# Patient Record
Sex: FEMALE | Race: WHITE
[De-identification: names, ages, dates, MRNs, and addresses within clinical notes are randomized per-mention and may not be internally consistent; named-entity substitution may affect disease eponyms.]

---

## 2017-05-25 ENCOUNTER — APPOINTMENT (OUTPATIENT)
Dept: OBGYN | Facility: CLINIC | Age: 77
End: 2017-05-25

## 2017-05-25 ENCOUNTER — RESULT REVIEW (OUTPATIENT)
Age: 77
End: 2017-05-25

## 2017-10-03 ENCOUNTER — APPOINTMENT (OUTPATIENT)
Dept: HEMATOLOGY ONCOLOGY | Facility: CLINIC | Age: 77
End: 2017-10-03

## 2017-10-25 ENCOUNTER — OUTPATIENT (OUTPATIENT)
Dept: OUTPATIENT SERVICES | Facility: HOSPITAL | Age: 77
LOS: 1 days | Discharge: HOME | End: 2017-10-25

## 2017-10-25 DIAGNOSIS — C50.919 MALIGNANT NEOPLASM OF UNSPECIFIED SITE OF UNSPECIFIED FEMALE BREAST: ICD-10-CM

## 2017-10-31 ENCOUNTER — RESULT REVIEW (OUTPATIENT)
Age: 77
End: 2017-10-31

## 2017-10-31 ENCOUNTER — APPOINTMENT (OUTPATIENT)
Dept: HEMATOLOGY ONCOLOGY | Facility: CLINIC | Age: 77
End: 2017-10-31
Payer: MEDICARE

## 2017-10-31 VITALS
RESPIRATION RATE: 16 BRPM | DIASTOLIC BLOOD PRESSURE: 81 MMHG | WEIGHT: 175.05 LBS | HEART RATE: 88 BPM | SYSTOLIC BLOOD PRESSURE: 158 MMHG | TEMPERATURE: 98.7 F | OXYGEN SATURATION: 96 %

## 2017-10-31 DIAGNOSIS — C50.919 MALIGNANT NEOPLASM OF UNSPECIFIED SITE OF UNSPECIFIED FEMALE BREAST: ICD-10-CM

## 2017-10-31 LAB
BASOPHILS # BLD AUTO: 0.1 K/UL — SIGNIFICANT CHANGE UP (ref 0–0.2)
BASOPHILS NFR BLD AUTO: 0.9 % — SIGNIFICANT CHANGE UP (ref 0–2)
EOSINOPHIL # BLD AUTO: 0.2 K/UL — SIGNIFICANT CHANGE UP (ref 0–0.5)
EOSINOPHIL NFR BLD AUTO: 2.9 % — SIGNIFICANT CHANGE UP (ref 0–6)
HCT VFR BLD CALC: 46.9 % — HIGH (ref 34.5–45)
HGB BLD-MCNC: 15.6 G/DL — HIGH (ref 11.5–15.5)
LYMPHOCYTES # BLD AUTO: 1.6 K/UL — SIGNIFICANT CHANGE UP (ref 1–3.3)
LYMPHOCYTES # BLD AUTO: 20 % — SIGNIFICANT CHANGE UP (ref 13–44)
MCHC RBC-ENTMCNC: 31.4 PG — SIGNIFICANT CHANGE UP (ref 27–34)
MCHC RBC-ENTMCNC: 33.2 G/DL — SIGNIFICANT CHANGE UP (ref 32–36)
MCV RBC AUTO: 94.4 FL — SIGNIFICANT CHANGE UP (ref 80–100)
MONOCYTES # BLD AUTO: 0.4 K/UL — SIGNIFICANT CHANGE UP (ref 0–0.9)
MONOCYTES NFR BLD AUTO: 5.4 % — SIGNIFICANT CHANGE UP (ref 2–14)
NEUTROPHILS # BLD AUTO: 5.5 K/UL — SIGNIFICANT CHANGE UP (ref 1.8–7.4)
NEUTROPHILS NFR BLD AUTO: 70.8 % — SIGNIFICANT CHANGE UP (ref 43–77)
PLATELET # BLD AUTO: 235 K/UL — SIGNIFICANT CHANGE UP (ref 150–400)
RBC # BLD: 4.97 M/UL — SIGNIFICANT CHANGE UP (ref 3.8–5.2)
RBC # FLD: 12.6 % — SIGNIFICANT CHANGE UP (ref 10.3–14.5)
WBC # BLD: 7.8 K/UL — SIGNIFICANT CHANGE UP (ref 3.8–10.5)
WBC # FLD AUTO: 7.8 K/UL — SIGNIFICANT CHANGE UP (ref 3.8–10.5)

## 2017-10-31 PROCEDURE — 99214 OFFICE O/P EST MOD 30 MIN: CPT

## 2019-01-03 LAB
ALBUMIN SERPL ELPH-MCNC: 3.9 G/DL
ALP BLD-CCNC: 70 U/L
ALT SERPL-CCNC: 28 U/L
ANION GAP SERPL CALC-SCNC: 15 MMOL/L
AST SERPL-CCNC: 19 U/L
BILIRUB SERPL-MCNC: 0.3 MG/DL
BUN SERPL-MCNC: 14 MG/DL
CALCIUM SERPL-MCNC: 9.5 MG/DL
CHLORIDE SERPL-SCNC: 102 MMOL/L
CO2 SERPL-SCNC: 26 MMOL/L
CREAT SERPL-MCNC: 0.78 MG/DL
GLUCOSE SERPL-MCNC: 145 MG/DL
POTASSIUM SERPL-SCNC: 4.4 MMOL/L
PROT SERPL-MCNC: 6.7 G/DL
SODIUM SERPL-SCNC: 143 MMOL/L

## 2023-03-15 ENCOUNTER — INPATIENT (INPATIENT)
Facility: HOSPITAL | Age: 83
LOS: 2 days | Discharge: HOME | DRG: 605 | End: 2023-03-18
Attending: FAMILY MEDICINE | Admitting: INTERNAL MEDICINE
Payer: MEDICARE

## 2023-03-15 VITALS
RESPIRATION RATE: 20 BRPM | OXYGEN SATURATION: 97 % | TEMPERATURE: 98 F | DIASTOLIC BLOOD PRESSURE: 71 MMHG | HEART RATE: 77 BPM | SYSTOLIC BLOOD PRESSURE: 170 MMHG

## 2023-03-15 DIAGNOSIS — R55 SYNCOPE AND COLLAPSE: ICD-10-CM

## 2023-03-15 LAB
ADD ON TEST-SPECIMEN IN LAB: SIGNIFICANT CHANGE UP
ALBUMIN SERPL ELPH-MCNC: 3.7 G/DL — SIGNIFICANT CHANGE UP (ref 3.3–5)
ALP SERPL-CCNC: 75 U/L — SIGNIFICANT CHANGE UP (ref 40–120)
ALT FLD-CCNC: 26 U/L — SIGNIFICANT CHANGE UP (ref 12–78)
ANION GAP SERPL CALC-SCNC: 3 MMOL/L — LOW (ref 5–17)
APPEARANCE UR: CLEAR — SIGNIFICANT CHANGE UP
AST SERPL-CCNC: 24 U/L — SIGNIFICANT CHANGE UP (ref 15–37)
BASOPHILS # BLD AUTO: 0.09 K/UL — SIGNIFICANT CHANGE UP (ref 0–0.2)
BASOPHILS NFR BLD AUTO: 0.9 % — SIGNIFICANT CHANGE UP (ref 0–2)
BILIRUB SERPL-MCNC: 0.4 MG/DL — SIGNIFICANT CHANGE UP (ref 0.2–1.2)
BILIRUB UR-MCNC: NEGATIVE — SIGNIFICANT CHANGE UP
BUN SERPL-MCNC: 21 MG/DL — SIGNIFICANT CHANGE UP (ref 7–23)
CALCIUM SERPL-MCNC: 9 MG/DL — SIGNIFICANT CHANGE UP (ref 8.5–10.1)
CHLORIDE SERPL-SCNC: 105 MMOL/L — SIGNIFICANT CHANGE UP (ref 96–108)
CO2 SERPL-SCNC: 28 MMOL/L — SIGNIFICANT CHANGE UP (ref 22–31)
COLOR SPEC: YELLOW — SIGNIFICANT CHANGE UP
CREAT SERPL-MCNC: 0.62 MG/DL — SIGNIFICANT CHANGE UP (ref 0.5–1.3)
DIFF PNL FLD: NEGATIVE — SIGNIFICANT CHANGE UP
EGFR: 87 ML/MIN/1.73M2 — SIGNIFICANT CHANGE UP
EOSINOPHIL # BLD AUTO: 0.16 K/UL — SIGNIFICANT CHANGE UP (ref 0–0.5)
EOSINOPHIL NFR BLD AUTO: 1.6 % — SIGNIFICANT CHANGE UP (ref 0–6)
FLUAV AG NPH QL: SIGNIFICANT CHANGE UP
FLUBV AG NPH QL: SIGNIFICANT CHANGE UP
GLUCOSE SERPL-MCNC: 101 MG/DL — HIGH (ref 70–99)
GLUCOSE UR QL: NEGATIVE — SIGNIFICANT CHANGE UP
HCT VFR BLD CALC: 46 % — HIGH (ref 34.5–45)
HGB BLD-MCNC: 15.5 G/DL — SIGNIFICANT CHANGE UP (ref 11.5–15.5)
IMM GRANULOCYTES NFR BLD AUTO: 0.3 % — SIGNIFICANT CHANGE UP (ref 0–0.9)
KETONES UR-MCNC: ABNORMAL
LEUKOCYTE ESTERASE UR-ACNC: NEGATIVE — SIGNIFICANT CHANGE UP
LYMPHOCYTES # BLD AUTO: 1.16 K/UL — SIGNIFICANT CHANGE UP (ref 1–3.3)
LYMPHOCYTES # BLD AUTO: 11.6 % — LOW (ref 13–44)
MCHC RBC-ENTMCNC: 30.4 PG — SIGNIFICANT CHANGE UP (ref 27–34)
MCHC RBC-ENTMCNC: 33.7 GM/DL — SIGNIFICANT CHANGE UP (ref 32–36)
MCV RBC AUTO: 90.2 FL — SIGNIFICANT CHANGE UP (ref 80–100)
MONOCYTES # BLD AUTO: 0.57 K/UL — SIGNIFICANT CHANGE UP (ref 0–0.9)
MONOCYTES NFR BLD AUTO: 5.7 % — SIGNIFICANT CHANGE UP (ref 2–14)
NEUTROPHILS # BLD AUTO: 7.95 K/UL — HIGH (ref 1.8–7.4)
NEUTROPHILS NFR BLD AUTO: 79.9 % — HIGH (ref 43–77)
NITRITE UR-MCNC: NEGATIVE — SIGNIFICANT CHANGE UP
PH UR: 5 — SIGNIFICANT CHANGE UP (ref 5–8)
PLATELET # BLD AUTO: 235 K/UL — SIGNIFICANT CHANGE UP (ref 150–400)
POTASSIUM SERPL-MCNC: 3.9 MMOL/L — SIGNIFICANT CHANGE UP (ref 3.5–5.3)
POTASSIUM SERPL-SCNC: 3.9 MMOL/L — SIGNIFICANT CHANGE UP (ref 3.5–5.3)
PROT SERPL-MCNC: 7.6 GM/DL — SIGNIFICANT CHANGE UP (ref 6–8.3)
PROT UR-MCNC: NEGATIVE — SIGNIFICANT CHANGE UP
RBC # BLD: 5.1 M/UL — SIGNIFICANT CHANGE UP (ref 3.8–5.2)
RBC # FLD: 13 % — SIGNIFICANT CHANGE UP (ref 10.3–14.5)
RSV RNA NPH QL NAA+NON-PROBE: SIGNIFICANT CHANGE UP
SARS-COV-2 RNA SPEC QL NAA+PROBE: SIGNIFICANT CHANGE UP
SODIUM SERPL-SCNC: 136 MMOL/L — SIGNIFICANT CHANGE UP (ref 135–145)
SP GR SPEC: 1.02 — SIGNIFICANT CHANGE UP (ref 1.01–1.02)
TROPONIN I, HIGH SENSITIVITY RESULT: 43 NG/L — SIGNIFICANT CHANGE UP
UROBILINOGEN FLD QL: NEGATIVE — SIGNIFICANT CHANGE UP
WBC # BLD: 9.96 K/UL — SIGNIFICANT CHANGE UP (ref 3.8–10.5)
WBC # FLD AUTO: 9.96 K/UL — SIGNIFICANT CHANGE UP (ref 3.8–10.5)

## 2023-03-15 PROCEDURE — 72125 CT NECK SPINE W/O DYE: CPT | Mod: 26,MA

## 2023-03-15 PROCEDURE — 70450 CT HEAD/BRAIN W/O DYE: CPT | Mod: 26,MA

## 2023-03-15 PROCEDURE — 12001 RPR S/N/AX/GEN/TRNK 2.5CM/<: CPT

## 2023-03-15 PROCEDURE — 99285 EMERGENCY DEPT VISIT HI MDM: CPT | Mod: 25

## 2023-03-15 PROCEDURE — 71045 X-RAY EXAM CHEST 1 VIEW: CPT | Mod: 26

## 2023-03-15 RX ORDER — TETANUS TOXOID, REDUCED DIPHTHERIA TOXOID AND ACELLULAR PERTUSSIS VACCINE, ADSORBED 5; 2.5; 8; 8; 2.5 [IU]/.5ML; [IU]/.5ML; UG/.5ML; UG/.5ML; UG/.5ML
0.5 SUSPENSION INTRAMUSCULAR ONCE
Refills: 0 | Status: COMPLETED | OUTPATIENT
Start: 2023-03-15 | End: 2023-03-15

## 2023-03-15 RX ADMIN — TETANUS TOXOID, REDUCED DIPHTHERIA TOXOID AND ACELLULAR PERTUSSIS VACCINE, ADSORBED 0.5 MILLILITER(S): 5; 2.5; 8; 8; 2.5 SUSPENSION INTRAMUSCULAR at 18:56

## 2023-03-15 NOTE — ED ADULT NURSE NOTE - OBJECTIVE STATEMENT
pt presents to ED from home s/p trip and fall backward down 2 steps. laceration to head. - blood thinners. -LOC. GCS 15. c/o pelvic pain and states she feels like she may have a UTI. NA 1721 MD Lazaro. transported to CT scan. Pt on stretcher in hallway now, son at bedside. Pt is awake and alert answering questions appropriately. Laceration noted to back of head, no active bleed at this time. Son reports that pt has a hx of UTI.

## 2023-03-15 NOTE — ED PROVIDER NOTE - CLINICAL SUMMARY MEDICAL DECISION MAKING FREE TEXT BOX
Patient with unwitnessed fall, ? syncope.  Scalp laceration, repaired in ED.  CT head, C spine, CXR WNL.  Labs WNL.  UA negative.  EKG nonischemic.  Attempted to ambulate patient, states feels more unstable than normal, requiring two person assist.  Plan for admission for syncope obs.  Discussed with Dr. Rosenthal, who would like the patient do not move from ED.

## 2023-03-15 NOTE — PHARMACOTHERAPY INTERVENTION NOTE - COMMENTS
Medication reconciliation completed.  Patient was unable to provide medication information, spoke to son Howard at bedside and they provided current medication list; confirmed with Dr. [facility].  As per Howard pt does not like to take meds with water, prefers to take with juice/applesauce.

## 2023-03-15 NOTE — ED ADULT NURSE NOTE - NSIMPLEMENTINTERV_GEN_ALL_ED
Implemented All Fall with Harm Risk Interventions:  [place] to call system. Call bell, personal items and telephone within reach. Instruct patient to call for assistance. Room bathroom lighting operational. Non-slip footwear when patient is off stretcher. Physically safe environment: no spills, clutter or unnecessary equipment. Stretcher in lowest position, wheels locked, appropriate side rails in place. Provide visual cue, wrist band, yellow gown, etc. Monitor gait and stability. Monitor for mental status changes and reorient to person, place, and time. Review medications for side effects contributing to fall risk. Reinforce activity limits and safety measures with patient and family. Provide visual clues: red socks.

## 2023-03-15 NOTE — ED ADULT TRIAGE NOTE - CHIEF COMPLAINT QUOTE
pt presents to ED from home s/p trip and fall backward down 2 steps. laceration to head. - blood thinners. -LOC. GCS 15. c/o pelvic pain and states she feels like she may have a UTI. NA 1721 MD Lazaro. transported to CT scan.

## 2023-03-15 NOTE — ED PROVIDER NOTE - PHYSICAL EXAMINATION
Physical Exam:  Gen: NAD, non-toxic appearing, able to ambulate without assistance  Head: NCAT  HEENT: EOMI, PEERLA, normal conjunctiva, tongue midline, oral mucosa moist  Lung: CTAB, no respiratory distress, no wheezes/rhonchi/rales B/L, speaking in full sentences  CV: RRR, no murmurs, rubs or gallops, distal pulses 2+ b/l  Abd: soft, nontender, no distention, no guarding, no rigidity, no rebound tenderness  MSK: no visible deformities, ROM normal in UE/LE  Skin: Warm, well perfused, no rash  Psych: normal affect, calm Physical Exam:  Gen: NAD, non-toxic appearing, able to ambulate without assistance  Head: NCAT  HEENT: EOMI, PEERLA, normal conjunctiva, tongue midline, oral mucosa moist  Lung: CTAB, no respiratory distress, no wheezes/rhonchi/rales B/L, speaking in full sentences  CV: RRR, no murmurs, rubs or gallops, distal pulses 2+ b/l  Abd: soft, nontender, no distention, no guarding, no rigidity, no rebound tenderness  MSK: no visible deformities, moving all extremities, ROM normal in UE/LE, no musculoskeletal TTP.  Skin: Warm, well perfused, no rash. + 2.5 cm laceration to occiput. No foreign body. No signs of basilar skull fracture.   Psych: normal affect, calm

## 2023-03-15 NOTE — ED PROVIDER NOTE - OBJECTIVE STATEMENT
82 y/o female presents to ED from home s/p trip and fall backward down 2 steps c/o laceration to head, pelvic pain and states she feels like she may have a UTI. laceration to head. - blood thinners. -LOC. GCS 15. 84 y/o female presents to ED from home s/p trip and fall backward down 2 steps c/o laceration to head, pelvic pain, and states she feels like she may have a UTI. - blood thinners. -LOC. 84 y/o female presents to ED from home s/p unwitnessed trip and fall backward down 2 steps c/o laceration to head, headache, and pelvic pain. Pt states she feels like she may have a UTI. + head strike. - blood thinners. -LOC. Son at bedside reports pt likely hit her head on a door or door hinge at home because it had blood on it. Pt ambulates with a cane at baseline. Pt lives with her other son. 84 y/o female PMHx of UTI presents to ED from home s/p unwitnessed trip and fall backward down 2 steps c/o laceration to head, headache, and pelvic pain. Pt states she feels like she may have a UTI. + head strike. - blood thinners. -LOC. Son at bedside reports pt likely hit her head on a door or door hinge at home because it had blood on it. Pt ambulates with a cane at baseline. Pt lives with her other son.

## 2023-03-16 DIAGNOSIS — R55 SYNCOPE AND COLLAPSE: ICD-10-CM

## 2023-03-16 LAB
CULTURE RESULTS: NO GROWTH — SIGNIFICANT CHANGE UP
SPECIMEN SOURCE: SIGNIFICANT CHANGE UP

## 2023-03-16 PROCEDURE — 80048 BASIC METABOLIC PNL TOTAL CA: CPT

## 2023-03-16 PROCEDURE — 36415 COLL VENOUS BLD VENIPUNCTURE: CPT

## 2023-03-16 PROCEDURE — G0378: CPT

## 2023-03-16 PROCEDURE — 85027 COMPLETE CBC AUTOMATED: CPT

## 2023-03-16 PROCEDURE — 97163 PT EVAL HIGH COMPLEX 45 MIN: CPT | Mod: GP

## 2023-03-16 PROCEDURE — 97530 THERAPEUTIC ACTIVITIES: CPT | Mod: GP

## 2023-03-16 PROCEDURE — 97116 GAIT TRAINING THERAPY: CPT | Mod: GP

## 2023-03-16 RX ORDER — ATORVASTATIN CALCIUM 80 MG/1
20 TABLET, FILM COATED ORAL AT BEDTIME
Refills: 0 | Status: DISCONTINUED | OUTPATIENT
Start: 2023-03-16 | End: 2023-03-18

## 2023-03-16 RX ORDER — AMLODIPINE BESYLATE 2.5 MG/1
10 TABLET ORAL AT BEDTIME
Refills: 0 | Status: DISCONTINUED | OUTPATIENT
Start: 2023-03-16 | End: 2023-03-18

## 2023-03-16 RX ORDER — ONDANSETRON 8 MG/1
4 TABLET, FILM COATED ORAL EVERY 8 HOURS
Refills: 0 | Status: DISCONTINUED | OUTPATIENT
Start: 2023-03-16 | End: 2023-03-18

## 2023-03-16 RX ORDER — ACETAMINOPHEN 500 MG
650 TABLET ORAL EVERY 6 HOURS
Refills: 0 | Status: DISCONTINUED | OUTPATIENT
Start: 2023-03-16 | End: 2023-03-18

## 2023-03-16 RX ORDER — LANOLIN ALCOHOL/MO/W.PET/CERES
3 CREAM (GRAM) TOPICAL AT BEDTIME
Refills: 0 | Status: DISCONTINUED | OUTPATIENT
Start: 2023-03-16 | End: 2023-03-18

## 2023-03-16 RX ORDER — VENLAFAXINE HCL 75 MG
150 CAPSULE, EXT RELEASE 24 HR ORAL DAILY
Refills: 0 | Status: DISCONTINUED | OUTPATIENT
Start: 2023-03-16 | End: 2023-03-18

## 2023-03-16 RX ADMIN — Medication 150 MILLIGRAM(S): at 10:17

## 2023-03-16 RX ADMIN — AMLODIPINE BESYLATE 10 MILLIGRAM(S): 2.5 TABLET ORAL at 21:17

## 2023-03-16 RX ADMIN — ATORVASTATIN CALCIUM 20 MILLIGRAM(S): 80 TABLET, FILM COATED ORAL at 21:17

## 2023-03-16 RX ADMIN — Medication 650 MILLIGRAM(S): at 08:58

## 2023-03-16 NOTE — H&P ADULT - HISTORY OF PRESENT ILLNESS
82 y/o female PMHx of UTI presents to ED from home s/p unwitnessed trip and fall backward down 2 steps c/o laceration to head, headache, and pelvic pain. Pt states she feels like she may have a UTI. + head strike. - blood thinners. -LOC. Son at bedside reports pt likely hit her head on a door or door hinge at home because it had blood on it. Pt ambulates with a cane at baseline. Pt lives with her other son 82 y/o female PMHx of UTI presents to ED from home s/p unwitnessed trip and fall backward down 2 steps c/o laceration to head, headache, and pelvic pain. Pt states she feels like she may have a UTI. + head strike. - blood thinners. -LOC. Son at bedside reports pt likely hit her head on a door or door hinge at home because it had blood on it. Pt ambulates with a cane at baseline. Pt lives with her other son. s/p 7 staples occipital area.

## 2023-03-16 NOTE — H&P ADULT - NSHPPHYSICALEXAM_GEN_ALL_CORE
Vital Signs Last 24 Hrs  T(C): 36.9 (16 Mar 2023 07:18), Max: 37.1 (16 Mar 2023 04:48)  T(F): 98.4 (16 Mar 2023 07:18), Max: 98.7 (16 Mar 2023 04:48)  HR: 73 (16 Mar 2023 07:18) (71 - 77)  BP: 114/64 (16 Mar 2023 07:18) (114/64 - 170/71)  BP(mean): 85 (16 Mar 2023 07:18) (85 - 95)  RR: 19 (16 Mar 2023 07:18) (19 - 20)  SpO2: 96% (16 Mar 2023 07:18) (96% - 97%)    Parameters below as of 16 Mar 2023 07:18  Patient On (Oxygen Delivery Method): room air    PHYSICAL EXAM:      Constitutional: NAD  Eyes: perrl, no conjunctival changes,lac  ENMT: no exudates, moist oral muc, uvula midline  Neck: no JVD, no LAD  Back: no cva tenderness  Respiratory: CTA, no exp wheezes  Cardiovascular: S1S2 reg, no murmur gallop or rub  Gastrointestinal: abd soft, NT/ND + BS  Genitourinary: voiding  Extremities: FROM, no joint effusions, no edema, no clubbing , no cyanosis  Vascular: pedal pulses + bilateral, warm extremities  Neurological: non focal, mot str 5/5/ all extr  Skin: no rashes  Lymph Nodes: no LAD Vital Signs Last 24 Hrs  T(C): 36.9 (16 Mar 2023 07:18), Max: 37.1 (16 Mar 2023 04:48)  T(F): 98.4 (16 Mar 2023 07:18), Max: 98.7 (16 Mar 2023 04:48)  HR: 73 (16 Mar 2023 07:18) (71 - 77)  BP: 114/64 (16 Mar 2023 07:18) (114/64 - 170/71)  BP(mean): 85 (16 Mar 2023 07:18) (85 - 95)  RR: 19 (16 Mar 2023 07:18) (19 - 20)  SpO2: 96% (16 Mar 2023 07:18) (96% - 97%)    Parameters below as of 16 Mar 2023 07:18  Patient On (Oxygen Delivery Method): room air    PHYSICAL EXAM:      Constitutional: NAD  Eyes: perrl, no conjunctival changes,lac  ENMT: no exudates, moist oral muc, uvula midline  Neck: no JVD, no LAD  Back: no cva tenderness  Respiratory: CTA, no exp wheezes  Cardiovascular: S1S2 reg, no murmur gallop or rub  Gastrointestinal: abd soft, NT/ND + BS  Genitourinary: voiding  Extremities: FROM, no joint effusions, no edema, no clubbing , no cyanosis  Vascular: pedal pulses + bilateral, warm extremities  Neurological: non focal, mot str 5/5/ all extr  Skin: occ lac repaired  Lymph Nodes: no LAD

## 2023-03-16 NOTE — PATIENT PROFILE ADULT - FALL HARM RISK - HARM RISK INTERVENTIONS

## 2023-03-16 NOTE — H&P ADULT - NSHPLABSRESULTS_GEN_ALL_CORE
15.5   9.96  )-----------( 235      ( 15 Mar 2023 18:25 )             46.0   03-15    136  |  105  |  21  ----------------------------<  101<H>  3.9   |  28  |  0.62    Ca    9.0      15 Mar 2023 18:25    TPro  7.6  /  Alb  3.7  /  TBili  0.4  /  DBili  x   /  AST  24  /  ALT  26  /  AlkPhos  75  03-15       CT Head No Cont (03.15.23 @ 17:57) >    IMPRESSION:  CT head:  -No acute intracranial findings.  -White matter small vessel disease.  -Left parietal scalp swelling. No fracture identified.    CT cervical spine:  -No acute fracture or dislocation.

## 2023-03-16 NOTE — H&P ADULT - NSHPPOADEEPVENOUSTHROMB_GEN_A_CORE
[address], [address]    Patient call but bad cell phone reception  Tried calling back but got v/m 
no

## 2023-03-16 NOTE — PHYSICAL THERAPY INITIAL EVALUATION ADULT - MODALITIES TREATMENT COMMENTS
pt left seated in chair post Eval; chair alarm donned; HM in place; RN Reny present; callbell in reach; pt instructed not to get up alone; call nursing for assist; ted well; denied pain

## 2023-03-16 NOTE — H&P ADULT - ASSESSMENT
* s/p fall     * s/p fall  s/p 7 staples occ area  ekg- rbb  denies LOC  PT eval    * HTN resume Norvasc

## 2023-03-17 ENCOUNTER — TRANSCRIPTION ENCOUNTER (OUTPATIENT)
Age: 83
End: 2023-03-17

## 2023-03-17 LAB
ANION GAP SERPL CALC-SCNC: 3 MMOL/L — LOW (ref 5–17)
BUN SERPL-MCNC: 15 MG/DL — SIGNIFICANT CHANGE UP (ref 7–23)
CALCIUM SERPL-MCNC: 8.7 MG/DL — SIGNIFICANT CHANGE UP (ref 8.5–10.1)
CHLORIDE SERPL-SCNC: 107 MMOL/L — SIGNIFICANT CHANGE UP (ref 96–108)
CO2 SERPL-SCNC: 28 MMOL/L — SIGNIFICANT CHANGE UP (ref 22–31)
CREAT SERPL-MCNC: 0.52 MG/DL — SIGNIFICANT CHANGE UP (ref 0.5–1.3)
EGFR: 91 ML/MIN/1.73M2 — SIGNIFICANT CHANGE UP
GLUCOSE SERPL-MCNC: 91 MG/DL — SIGNIFICANT CHANGE UP (ref 70–99)
HCT VFR BLD CALC: 41.4 % — SIGNIFICANT CHANGE UP (ref 34.5–45)
HGB BLD-MCNC: 13.7 G/DL — SIGNIFICANT CHANGE UP (ref 11.5–15.5)
MCHC RBC-ENTMCNC: 29.8 PG — SIGNIFICANT CHANGE UP (ref 27–34)
MCHC RBC-ENTMCNC: 33.1 GM/DL — SIGNIFICANT CHANGE UP (ref 32–36)
MCV RBC AUTO: 90.2 FL — SIGNIFICANT CHANGE UP (ref 80–100)
PLATELET # BLD AUTO: 204 K/UL — SIGNIFICANT CHANGE UP (ref 150–400)
POTASSIUM SERPL-MCNC: 3.5 MMOL/L — SIGNIFICANT CHANGE UP (ref 3.5–5.3)
POTASSIUM SERPL-SCNC: 3.5 MMOL/L — SIGNIFICANT CHANGE UP (ref 3.5–5.3)
RBC # BLD: 4.59 M/UL — SIGNIFICANT CHANGE UP (ref 3.8–5.2)
RBC # FLD: 13.2 % — SIGNIFICANT CHANGE UP (ref 10.3–14.5)
SODIUM SERPL-SCNC: 138 MMOL/L — SIGNIFICANT CHANGE UP (ref 135–145)
WBC # BLD: 5.91 K/UL — SIGNIFICANT CHANGE UP (ref 3.8–10.5)
WBC # FLD AUTO: 5.91 K/UL — SIGNIFICANT CHANGE UP (ref 3.8–10.5)

## 2023-03-17 PROCEDURE — 99232 SBSQ HOSP IP/OBS MODERATE 35: CPT

## 2023-03-17 RX ADMIN — AMLODIPINE BESYLATE 10 MILLIGRAM(S): 2.5 TABLET ORAL at 21:10

## 2023-03-17 RX ADMIN — Medication 150 MILLIGRAM(S): at 10:13

## 2023-03-17 RX ADMIN — ATORVASTATIN CALCIUM 20 MILLIGRAM(S): 80 TABLET, FILM COATED ORAL at 21:10

## 2023-03-17 NOTE — DISCHARGE NOTE NURSING/CASE MANAGEMENT/SOCIAL WORK - NSDCVIVACCINE_GEN_ALL_CORE_FT
Tdap; 15-Mar-2023 18:56; Ayana Gonzales (YESICA); [employer]; [id number] (Exp. Date: 08-Dec-2024); IntraMuscular; Deltoid Left.; 0.5 milliLiter(s); VIS (VIS Published: 09-May-2013, VIS Presented: 15-Mar-2023);

## 2023-03-17 NOTE — DISCHARGE NOTE NURSING/CASE MANAGEMENT/SOCIAL WORK - NSSCCONTNUM_GEN_ALL_CORE
Call patient.  Kidney stone pain should be very severe.  Encourage her to drink plenty of water.  Should repeat UA for her blood in her urine in a couple of weeks, and if still present, then refer to urology.   [phone]

## 2023-03-17 NOTE — DISCHARGE NOTE NURSING/CASE MANAGEMENT/SOCIAL WORK - PATIENT PORTAL LINK FT
You can access the FollowMyHealth Patient Portal offered by [facility] by registering at the following website: [email or web address]. By joining [facility]’s FollowMyHealth portal, you will also be able to view your health information using other applications (apps) compatible with our system.

## 2023-03-18 ENCOUNTER — TRANSCRIPTION ENCOUNTER (OUTPATIENT)
Age: 83
End: 2023-03-18

## 2023-03-18 VITALS
TEMPERATURE: 98 F | SYSTOLIC BLOOD PRESSURE: 122 MMHG | HEART RATE: 72 BPM | DIASTOLIC BLOOD PRESSURE: 61 MMHG | OXYGEN SATURATION: 96 % | RESPIRATION RATE: 17 BRPM

## 2023-03-18 PROCEDURE — 99239 HOSP IP/OBS DSCHRG MGMT >30: CPT

## 2023-03-18 RX ADMIN — Medication 150 MILLIGRAM(S): at 11:48

## 2023-03-18 NOTE — DISCHARGE NOTE PROVIDER - HOSPITAL COURSE
HOSPITALIST PROGRESS NOTE:  SUBJECTIVE:  PCP:  Chief Complaint: Patient is a 83y old  Female who presents with a chief complaint of fall (16 Mar 2023 08:26)      HPI:  82 y/o female PMHx of UTI presents to ED from home s/p unwitnessed trip and fall backward down 2 steps c/o laceration to head, headache, and pelvic pain. Pt states she feels like she may have a UTI. + head strike. - blood thinners. -LOC. Son at bedside reports pt likely hit her head on a door or door hinge at home because it had blood on it. Pt ambulates with a cane at baseline. Pt lives with her other son. s/p 7 staples occipital area. (16 Mar 2023 08:26)    3/17:  Above reviewed. Patient denies any headache, dizziness, Dyspnea palpitations Patient is retaining urine.   3/18: Overnight, no urinary retention; patient has no complaints. no worsening headaches or neurological symptoms'      * s/p fall with head trauma  -transfer to med surg  -CT head NAD  -No signs of infection  -No alarms on tele, troponin neg  s/p 7 staples occ area; FU with Primary to remove staples in 10 days   ekg- rbb  denies LOC  PT eval  fall precautions    *Urinary Retention  -continue to bladder scan Q6H  -UA normal  -FU with Urology as outpatient     * HTN/HLD/Depression  -resume home meds    total time 80 minutes

## 2023-03-18 NOTE — DISCHARGE NOTE PROVIDER - NSDCMRMEDTOKEN_GEN_ALL_CORE_FT
amLODIPine 10 mg oral tablet: 1 tab(s) orally once a day  atorvastatin 20 mg oral tablet: 1 tab(s) orally once a day  venlafaxine 150 mg oral tablet, extended release: 1 tab(s) orally once a day

## 2023-03-18 NOTE — DISCHARGE NOTE PROVIDER - NSDCCPCAREPLAN_GEN_ALL_CORE_FT
PRINCIPAL DISCHARGE DIAGNOSIS  Diagnosis: Syncope  Assessment and Plan of Treatment: * s/p fall with head trauma  -CT head NAD  -No signs of infection  -No alarms on tele, troponin neg  s/p 7 staples occ area; FU with Primary to remove staples in 10 days   fall precautions        SECONDARY DISCHARGE DIAGNOSES  Diagnosis: Scalp laceration  Assessment and Plan of Treatment: s/p 7 staples occ area; FU with Primary to remove staples in 10 days    Diagnosis: Urinary retention  Assessment and Plan of Treatment: FU with Urology as outpatient

## 2023-03-18 NOTE — PROGRESS NOTE ADULT - ASSESSMENT
* s/p fall with head trauma  -transfer to med surg  -CT head NAD  -No signs of infection  -No alarms on tele, troponin neg  s/p 7 staples occ area  ekg- rbb  denies LOC  PT eval  fall precautions    *Urinary Retention  -continue to bladder scan Q6H  -UA normal    * HTN/HLD/Depression  -resume home meds    
  * s/p fall with head trauma  -transfer to med surg  -CT head NAD  -No signs of infection  -No alarms on tele, troponin neg  s/p 7 staples occ area; FU with Primary to remove staples in 10 days   ekg- rbb  denies LOC  PT eval  fall precautions    *Urinary Retention  -continue to bladder scan Q6H  -UA normal  -FU with Urology as outpatient     * HTN/HLD/Depression  -resume home meds

## 2023-03-18 NOTE — PROGRESS NOTE ADULT - SUBJECTIVE AND OBJECTIVE BOX
HOSPITALIST PROGRESS NOTE:  SUBJECTIVE:  PCP:  Chief Complaint: Patient is a 83y old  Female who presents with a chief complaint of fall (16 Mar 2023 08:26)      HPI:  82 y/o female PMHx of UTI presents to ED from home s/p unwitnessed trip and fall backward down 2 steps c/o laceration to head, headache, and pelvic pain. Pt states she feels like she may have a UTI. + head strike. - blood thinners. -LOC. Son at bedside reports pt likely hit her head on a door or door hinge at home because it had blood on it. Pt ambulates with a cane at baseline. Pt lives with her other son. s/p 7 staples occipital area. (16 Mar 2023 08:26)    3/17:  Above reviewed. Patietn denies any headache, dizziness, Dysonea, palpiations; Patient is retaining urine.       Allergies:  No Known Allergies    REVIEW OF SYSTEMS:  See HPI. All other review of systems is negative unless indicated above.     OBJECTIVE  Physical Exam:  Vital Signs:    Vital Signs Last 24 Hrs  T(C): 36.9 (17 Mar 2023 14:41), Max: 36.9 (17 Mar 2023 14:41)  T(F): 98.5 (17 Mar 2023 14:41), Max: 98.5 (17 Mar 2023 14:41)  HR: 71 (17 Mar 2023 14:41) (67 - 84)  BP: 110/61 (17 Mar 2023 14:41) (106/57 - 119/62)  BP(mean): --  RR: 18 (17 Mar 2023 14:41) (17 - 18)  SpO2: 99% (17 Mar 2023 14:41) (97% - 100%)    Parameters below as of 17 Mar 2023 14:41  Patient On (Oxygen Delivery Method): room air      I&O's Summary    17 Mar 2023 07:01  -  17 Mar 2023 17:06  --------------------------------------------------------  IN: 0 mL / OUT: 500 mL / NET: -500 mL        Constitutional: NAD, awake and alert  Neurological: no focal deficits  HEENT: PERRLA, EOMI, MMM  Neck: Soft and supple, No LAD, No JVD  Respiratory: Breath sounds are clear bilaterally, No wheezing, rales or rhonchi  Cardiovascular: S1 and S2, regular rate and rhythm; no Murmurs, gallops or rubs  Gastrointestinal: Bowel Sounds present, soft, nontender, nondistended, no guarding, no rebound tenderness  Back: No CVA tenderness   Extremities: No peripheral edema  Vascular: 2+ peripheral pulses  Musculoskeletal: 5/5 strength b/l upper and lower extremities  Skin: No rashes  Breast: Deferred  Rectal: Deferred    MEDICATIONS  (STANDING):  amLODIPine   Tablet 10 milliGRAM(s) Oral at bedtime  atorvastatin 20 milliGRAM(s) Oral at bedtime  venlafaxine XR. 150 milliGRAM(s) Oral daily      LABS: All Labs Reviewed:                        13.7   5.91  )-----------( 204      ( 17 Mar 2023 07:09 )             41.4         138  |  107  |  15  ----------------------------<  91  3.5   |  28  |  0.52    Ca    8.7      17 Mar 2023 07:09    TPro  7.6  /  Alb  3.7  /  TBili  0.4  /  DBili  x   /  AST  24  /  ALT  26  /  AlkPhos  75  03-15      Urinalysis Basic - ( 15 Mar 2023 20:57 )    Color: Yellow / Appearance: Clear / S.020 / pH: x  Gluc: x / Ketone: Small  / Bili: Negative / Urobili: Negative   Blood: x / Protein: Negative / Nitrite: Negative   Leuk Esterase: Negative / RBC: x / WBC x   Sq Epi: x / Non Sq Epi: x / Bacteria: x        Blood Culture:   03-15 @ 20:57  Organism --  Gram Stain Blood -- Gram Stain --  Specimen Source Clean Catch None  Culture-Blood --        RADIOLOGY/EKG:    < from: Xray Chest 1 View- PORTABLE-Urgent (Xray Chest 1 View- PORTABLE-Urgent .) (03.15.23 @ 21:44) >    Impression:    No acute pulmonary disease.      < end of copied text >  < from: CT Cervical Spine No Cont (03.15.23 @ 17:57) >    IMPRESSION:  CT head:  -No acute intracranial findings.  -White matter small vessel disease.  -Left parietal scalp swelling. No fracture identified.    CT cervical spine:  -No acute fracture or dislocation.      < end of copied text >          
HOSPITALIST PROGRESS NOTE:  SUBJECTIVE:  PCP:  Chief Complaint: Patient is a 83y old  Female who presents with a chief complaint of fall (16 Mar 2023 08:26)      HPI:  84 y/o female PMHx of UTI presents to ED from home s/p unwitnessed trip and fall backward down 2 steps c/o laceration to head, headache, and pelvic pain. Pt states she feels like she may have a UTI. + head strike. - blood thinners. -LOC. Son at bedside reports pt likely hit her head on a door or door hinge at home because it had blood on it. Pt ambulates with a cane at baseline. Pt lives with her other son. s/p 7 staples occipital area. (16 Mar 2023 08:26)    3/17:  Above reviewed. Patient denies any headache, dizziness, Dyspnea palpitations Patient is retaining urine.   3/18: Overnight, no urinary retention; patient has no complaints. no worsening headaches or neurological symptoms'      Allergies:  No Known Allergies    REVIEW OF SYSTEMS:  See HPI. All other review of systems is negative unless indicated above.     OBJECTIVE  Physical Exam:  Vital Signs Last 24 Hrs  T(C): 36.8 (18 Mar 2023 07:41), Max: 37.1 (17 Mar 2023 20:40)  T(F): 98.3 (18 Mar 2023 07:41), Max: 98.8 (17 Mar 2023 20:40)  HR: 72 (18 Mar 2023 07:41) (71 - 75)  BP: 122/61 (18 Mar 2023 07:41) (110/61 - 122/61)  BP(mean): --  RR: 17 (18 Mar 2023 07:41) (17 - 18)  SpO2: 96% (18 Mar 2023 07:41) (93% - 99%)    Parameters below as of 18 Mar 2023 07:41  Patient On (Oxygen Delivery Method): room air    Constitutional: NAD, awake and alert  Neurological: no focal deficits  HEENT: PERRLA, EOMI, MMM  Neck: Soft and supple, No LAD, No JVD  Respiratory: Breath sounds are clear bilaterally, No wheezing, rales or rhonchi  Cardiovascular: S1 and S2, regular rate and rhythm; no Murmurs, gallops or rubs  Gastrointestinal: Bowel Sounds present, soft, nontender, nondistended, no guarding, no rebound tenderness  Back: No CVA tenderness   Extremities: No peripheral edema  Vascular: 2+ peripheral pulses  Musculoskeletal: 5/5 strength b/l upper and lower extremities  Skin: No rashes  Breast: Deferred  Rectal: Deferred    MEDICATIONS  (STANDING):  amLODIPine   Tablet 10 milliGRAM(s) Oral at bedtime  atorvastatin 20 milliGRAM(s) Oral at bedtime  venlafaxine XR. 150 milliGRAM(s) Oral daily    Lab Results:  CBC  CBC Full  -  ( 17 Mar 2023 07:09 )  WBC Count : 5.91 K/uL  RBC Count : 4.59 M/uL  Hemoglobin : 13.7 g/dL  Hematocrit : 41.4 %  Platelet Count - Automated : 204 K/uL  Mean Cell Volume : 90.2 fl  Mean Cell Hemoglobin : 29.8 pg  Mean Cell Hemoglobin Concentration : 33.1 gm/dL  Auto Neutrophil # : x  Auto Lymphocyte # : x  Auto Monocyte # : x  Auto Eosinophil # : x  Auto Basophil # : x  Auto Neutrophil % : x  Auto Lymphocyte % : x  Auto Monocyte % : x  Auto Eosinophil % : x  Auto Basophil % : x    .		Differential:	[] Automated		[] Manual  Chemistry                        13.7   5.91  )-----------( 204      ( 17 Mar 2023 07:09 )             41.4     03-17    138  |  107  |  15  ----------------------------<  91  3.5   |  28  |  0.52    Ca    8.7      17 Mar 2023 07:09      MICROBIOLOGY/CULTURES:  Culture Results:   No growth (03-15 @ 20:57)    Urinalysis Basic - ( 15 Mar 2023 20:57 )    Color: Yellow / Appearance: Clear / S.020 / pH: x  Gluc: x / Ketone: Small  / Bili: Negative / Urobili: Negative   Blood: x / Protein: Negative / Nitrite: Negative   Leuk Esterase: Negative / RBC: x / WBC x   Sq Epi: x / Non Sq Epi: x / Bacteria: x      RADIOLOGY/EKG:    < from: Xray Chest 1 View- PORTABLE-Urgent (Xray Chest 1 View- PORTABLE-Urgent .) (03.15.23 @ 21:44) >    Impression:    No acute pulmonary disease.      < end of copied text >  < from: CT Cervical Spine No Cont (03.15.23 @ 17:57) >    IMPRESSION:  CT head:  -No acute intracranial findings.  -White matter small vessel disease.  -Left parietal scalp swelling. No fracture identified.    CT cervical spine:  -No acute fracture or dislocation.      < end of copied text >

## 2023-03-22 DIAGNOSIS — R33.9 RETENTION OF URINE, UNSPECIFIED: ICD-10-CM
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
## 2023-03-22 DIAGNOSIS — F32.A DEPRESSION, UNSPECIFIED: ICD-10-CM
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
## 2023-03-22 DIAGNOSIS — Z20.822 CONTACT WITH AND (SUSPECTED) EXPOSURE TO COVID-19: ICD-10-CM

## 2023-03-22 DIAGNOSIS — I10 ESSENTIAL (PRIMARY) HYPERTENSION: ICD-10-CM

## 2023-03-22 DIAGNOSIS — E78.5 HYPERLIPIDEMIA, UNSPECIFIED: ICD-10-CM

## 2023-03-22 DIAGNOSIS — S01.01XA LACERATION WITHOUT FOREIGN BODY OF SCALP, INITIAL ENCOUNTER: ICD-10-CM

## 2023-05-01 ENCOUNTER — INPATIENT (INPATIENT)
Facility: HOSPITAL | Age: 83
LOS: 2 days | Discharge: HOME | DRG: 312 | End: 2023-05-04
Attending: HOSPITALIST | Admitting: FAMILY MEDICINE
Payer: MEDICARE

## 2023-05-01 VITALS — WEIGHT: 127.21 LBS

## 2023-05-01 DIAGNOSIS — Z98.890 OTHER SPECIFIED POSTPROCEDURAL STATES: Chronic | ICD-10-CM

## 2023-05-01 DIAGNOSIS — Z96.653 PRESENCE OF ARTIFICIAL KNEE JOINT, BILATERAL: Chronic | ICD-10-CM

## 2023-05-01 DIAGNOSIS — R55 SYNCOPE AND COLLAPSE: ICD-10-CM

## 2023-05-01 PROBLEM — N39.0 URINARY TRACT INFECTION, SITE NOT SPECIFIED: Chronic | Status: ACTIVE | Noted: 2023-03-15

## 2023-05-01 LAB
ALBUMIN SERPL ELPH-MCNC: 3.5 G/DL — SIGNIFICANT CHANGE UP (ref 3.3–5)
ALP SERPL-CCNC: 80 U/L — SIGNIFICANT CHANGE UP (ref 40–120)
ALT FLD-CCNC: 30 U/L — SIGNIFICANT CHANGE UP (ref 12–78)
ANION GAP SERPL CALC-SCNC: 5 MMOL/L — SIGNIFICANT CHANGE UP (ref 5–17)
APTT BLD: 26 SEC — LOW (ref 27.5–35.5)
AST SERPL-CCNC: 21 U/L — SIGNIFICANT CHANGE UP (ref 15–37)
BASE EXCESS BLDV CALC-SCNC: 2 MMOL/L — SIGNIFICANT CHANGE UP
BASOPHILS # BLD AUTO: 0.07 K/UL — SIGNIFICANT CHANGE UP (ref 0–0.2)
BASOPHILS NFR BLD AUTO: 1 % — SIGNIFICANT CHANGE UP (ref 0–2)
BILIRUB SERPL-MCNC: 0.3 MG/DL — SIGNIFICANT CHANGE UP (ref 0.2–1.2)
BUN SERPL-MCNC: 23 MG/DL — SIGNIFICANT CHANGE UP (ref 7–23)
CALCIUM SERPL-MCNC: 9.1 MG/DL — SIGNIFICANT CHANGE UP (ref 8.5–10.1)
CHLORIDE SERPL-SCNC: 109 MMOL/L — HIGH (ref 96–108)
CO2 SERPL-SCNC: 26 MMOL/L — SIGNIFICANT CHANGE UP (ref 22–31)
CREAT SERPL-MCNC: 0.63 MG/DL — SIGNIFICANT CHANGE UP (ref 0.5–1.3)
EGFR: 87 ML/MIN/1.73M2 — SIGNIFICANT CHANGE UP
EOSINOPHIL # BLD AUTO: 0.15 K/UL — SIGNIFICANT CHANGE UP (ref 0–0.5)
EOSINOPHIL NFR BLD AUTO: 2.1 % — SIGNIFICANT CHANGE UP (ref 0–6)
GLUCOSE SERPL-MCNC: 105 MG/DL — HIGH (ref 70–99)
HCO3 BLDV-SCNC: 28 MMOL/L — SIGNIFICANT CHANGE UP (ref 22–29)
HCT VFR BLD CALC: 42.6 % — SIGNIFICANT CHANGE UP (ref 34.5–45)
HGB BLD-MCNC: 14.3 G/DL — SIGNIFICANT CHANGE UP (ref 11.5–15.5)
IMM GRANULOCYTES NFR BLD AUTO: 0.1 % — SIGNIFICANT CHANGE UP (ref 0–0.9)
INR BLD: 0.98 RATIO — SIGNIFICANT CHANGE UP (ref 0.88–1.16)
LACTATE SERPL-SCNC: 1.4 MMOL/L — SIGNIFICANT CHANGE UP (ref 0.7–2)
LYMPHOCYTES # BLD AUTO: 1.28 K/UL — SIGNIFICANT CHANGE UP (ref 1–3.3)
LYMPHOCYTES # BLD AUTO: 18 % — SIGNIFICANT CHANGE UP (ref 13–44)
MCHC RBC-ENTMCNC: 30.4 PG — SIGNIFICANT CHANGE UP (ref 27–34)
MCHC RBC-ENTMCNC: 33.6 GM/DL — SIGNIFICANT CHANGE UP (ref 32–36)
MCV RBC AUTO: 90.4 FL — SIGNIFICANT CHANGE UP (ref 80–100)
MONOCYTES # BLD AUTO: 0.43 K/UL — SIGNIFICANT CHANGE UP (ref 0–0.9)
MONOCYTES NFR BLD AUTO: 6.1 % — SIGNIFICANT CHANGE UP (ref 2–14)
NEUTROPHILS # BLD AUTO: 5.16 K/UL — SIGNIFICANT CHANGE UP (ref 1.8–7.4)
NEUTROPHILS NFR BLD AUTO: 72.7 % — SIGNIFICANT CHANGE UP (ref 43–77)
PCO2 BLDV: 47 MMHG — HIGH (ref 39–42)
PH BLDV: 7.38 — SIGNIFICANT CHANGE UP (ref 7.32–7.43)
PLATELET # BLD AUTO: 243 K/UL — SIGNIFICANT CHANGE UP (ref 150–400)
PO2 BLDV: 54 MMHG — SIGNIFICANT CHANGE UP
POTASSIUM SERPL-MCNC: 3.7 MMOL/L — SIGNIFICANT CHANGE UP (ref 3.5–5.3)
POTASSIUM SERPL-SCNC: 3.7 MMOL/L — SIGNIFICANT CHANGE UP (ref 3.5–5.3)
PROT SERPL-MCNC: 7.1 GM/DL — SIGNIFICANT CHANGE UP (ref 6–8.3)
PROTHROM AB SERPL-ACNC: 11.4 SEC — SIGNIFICANT CHANGE UP (ref 10.5–13.4)
RBC # BLD: 4.71 M/UL — SIGNIFICANT CHANGE UP (ref 3.8–5.2)
RBC # FLD: 13.9 % — SIGNIFICANT CHANGE UP (ref 10.3–14.5)
SAO2 % BLDV: 86.9 % — SIGNIFICANT CHANGE UP
SODIUM SERPL-SCNC: 140 MMOL/L — SIGNIFICANT CHANGE UP (ref 135–145)
TROPONIN I, HIGH SENSITIVITY RESULT: 53.56 NG/L — SIGNIFICANT CHANGE UP
WBC # BLD: 7.1 K/UL — SIGNIFICANT CHANGE UP (ref 3.8–10.5)
WBC # FLD AUTO: 7.1 K/UL — SIGNIFICANT CHANGE UP (ref 3.8–10.5)

## 2023-05-01 PROCEDURE — 36415 COLL VENOUS BLD VENIPUNCTURE: CPT

## 2023-05-01 PROCEDURE — 70450 CT HEAD/BRAIN W/O DYE: CPT | Mod: 26,MA

## 2023-05-01 PROCEDURE — 93306 TTE W/DOPPLER COMPLETE: CPT

## 2023-05-01 PROCEDURE — 93010 ELECTROCARDIOGRAM REPORT: CPT

## 2023-05-01 PROCEDURE — 80048 BASIC METABOLIC PNL TOTAL CA: CPT

## 2023-05-01 PROCEDURE — 93880 EXTRACRANIAL BILAT STUDY: CPT | Mod: 26

## 2023-05-01 PROCEDURE — 97530 THERAPEUTIC ACTIVITIES: CPT | Mod: GP

## 2023-05-01 PROCEDURE — 97116 GAIT TRAINING THERAPY: CPT | Mod: GP

## 2023-05-01 PROCEDURE — 99222 1ST HOSP IP/OBS MODERATE 55: CPT

## 2023-05-01 PROCEDURE — 80061 LIPID PANEL: CPT

## 2023-05-01 PROCEDURE — 70551 MRI BRAIN STEM W/O DYE: CPT | Mod: MA

## 2023-05-01 PROCEDURE — 71045 X-RAY EXAM CHEST 1 VIEW: CPT | Mod: 26

## 2023-05-01 PROCEDURE — 70551 MRI BRAIN STEM W/O DYE: CPT | Mod: 26

## 2023-05-01 PROCEDURE — 81001 URINALYSIS AUTO W/SCOPE: CPT

## 2023-05-01 PROCEDURE — 99285 EMERGENCY DEPT VISIT HI MDM: CPT

## 2023-05-01 PROCEDURE — 87086 URINE CULTURE/COLONY COUNT: CPT

## 2023-05-01 PROCEDURE — 97162 PT EVAL MOD COMPLEX 30 MIN: CPT | Mod: GP

## 2023-05-01 PROCEDURE — 95714 VEEG EA 12-26 HR UNMNTR: CPT

## 2023-05-01 PROCEDURE — 83036 HEMOGLOBIN GLYCOSYLATED A1C: CPT

## 2023-05-01 PROCEDURE — 99497 ADVNCD CARE PLAN 30 MIN: CPT | Mod: 25

## 2023-05-01 PROCEDURE — 95700 EEG CONT REC W/VID EEG TECH: CPT

## 2023-05-01 RX ORDER — VENLAFAXINE HCL 75 MG
150 CAPSULE, EXT RELEASE 24 HR ORAL DAILY
Refills: 0 | Status: DISCONTINUED | OUTPATIENT
Start: 2023-05-01 | End: 2023-05-04

## 2023-05-01 RX ORDER — ATORVASTATIN CALCIUM 80 MG/1
20 TABLET, FILM COATED ORAL AT BEDTIME
Refills: 0 | Status: DISCONTINUED | OUTPATIENT
Start: 2023-05-01 | End: 2023-05-04

## 2023-05-01 RX ORDER — ATORVASTATIN CALCIUM 80 MG/1
1 TABLET, FILM COATED ORAL
Qty: 0 | Refills: 0 | DISCHARGE

## 2023-05-01 RX ORDER — SODIUM CHLORIDE 9 MG/ML
1000 INJECTION INTRAMUSCULAR; INTRAVENOUS; SUBCUTANEOUS ONCE
Refills: 0 | Status: COMPLETED | OUTPATIENT
Start: 2023-05-01 | End: 2023-05-01

## 2023-05-01 RX ORDER — AMLODIPINE BESYLATE 2.5 MG/1
10 TABLET ORAL DAILY
Refills: 0 | Status: DISCONTINUED | OUTPATIENT
Start: 2023-05-01 | End: 2023-05-04

## 2023-05-01 RX ORDER — SODIUM CHLORIDE 9 MG/ML
3 INJECTION INTRAMUSCULAR; INTRAVENOUS; SUBCUTANEOUS
Refills: 0 | Status: DISCONTINUED | OUTPATIENT
Start: 2023-05-01 | End: 2023-05-04

## 2023-05-01 RX ADMIN — SODIUM CHLORIDE 3 MILLILITER(S): 9 INJECTION INTRAMUSCULAR; INTRAVENOUS; SUBCUTANEOUS at 23:16

## 2023-05-01 RX ADMIN — SODIUM CHLORIDE 1000 MILLILITER(S): 9 INJECTION INTRAMUSCULAR; INTRAVENOUS; SUBCUTANEOUS at 18:36

## 2023-05-01 RX ADMIN — SODIUM CHLORIDE 3 MILLILITER(S): 9 INJECTION INTRAMUSCULAR; INTRAVENOUS; SUBCUTANEOUS at 18:42

## 2023-05-01 RX ADMIN — ATORVASTATIN CALCIUM 20 MILLIGRAM(S): 80 TABLET, FILM COATED ORAL at 23:22

## 2023-05-01 NOTE — ED PROVIDER NOTE - CLINICAL SUMMARY MEDICAL DECISION MAKING FREE TEXT BOX
84 y/o female BIB daughter with an episode 1 hour PTA of head turning to the right, RUE rigidity, and unresponsiveness with eyes open lasting 1 minute, followed by vomiting and then returned to baseline. Now with generalized weakness, no incontinence or oral bleeding. Head injury 1 month ago. On arrival, neurologically intact NIHSS of 0, symptoms not suggestive of CVA. No indications for code stroke. Will work-up for syncope, seizure, ICH, reassess. 84 y/o female BIB daughter with an episode 1 hour PTA of head turning to the right, RUE rigidity, and unresponsiveness with eyes open lasting 1 minute, followed by vomiting and then returned to baseline. Now with generalized weakness, no incontinence or oral bleeding. Head injury 1 month ago. On arrival, neurologically intact NIHSS of 0, symptoms not suggestive of CVA. No indications for code stroke. Will work-up for syncope, seizure, ICH, reassess.  -CTH, labs unremarkable. initiated Neuro cs in ED, see note for details, appreciate recs. Will admit for further workup of syncope vs seizure

## 2023-05-01 NOTE — ED ADULT NURSE NOTE - CHIEF COMPLAINT QUOTE
SIB  MD AZEVEDO FOR SUDDEN ONSET OF AMSX1 HOUR AGO, RIGID POSTURE, WEAKNESS ON LEFT SIDE. PT IS IN TRIAGE AT THIS MOMENT SHE IS BACK TO BASELINE ABLE TO ANSWER ALL QUESTIONS., ABLE TO STAND AND RESPOND TO QUESTIONS. MD NEWELL AT BEDSIDE. nEURO ALERT CALLED.@[phone]

## 2023-05-01 NOTE — ED ADULT NURSE REASSESSMENT NOTE - NS ED NURSE REASSESS COMMENT FT1
patient resting in bed comfortably on monitor. alert and oriented x 4, perrl, respirations even and unlabored. ate dinner without any issues. mri resulted. patient admitted, awaiting bed assignment. family at bedside. no complaints at this time. will continue to monitor.

## 2023-05-01 NOTE — H&P ADULT - NSHPPHYSICALEXAM_GEN_ALL_CORE
ICU Vital Signs Last 24 Hrs  T(C): 36.9 (01 May 2023 23:33), Max: 36.9 (01 May 2023 23:33)  T(F): 98.4 (01 May 2023 23:33), Max: 98.4 (01 May 2023 23:33)  HR: 71 (01 May 2023 23:33) (67 - 71)  BP: 125/65 (01 May 2023 23:33) (125/65 - 133/70)  BP(mean): 80 (01 May 2023 23:33) (80 - 89)    RR: 17 (01 May 2023 23:33) (13 - 20)  SpO2: 98% (01 May 2023 23:33) (98% - 100%)    O2 Parameters below as of 01 May 2023 23:33  Patient On (Oxygen Delivery Method): room air

## 2023-05-01 NOTE — ED PROVIDER NOTE - PHYSICAL EXAMINATION
General: AAOx3, NAD  HEENT: NCAT  Cardiac: Normal rate and rhythm, soft systolic murmur, normal peripheral perfusion  Respiratory: Normal rate and effort. CTAB  GI: Soft, nondistended, nontender  Neuro: No focal deficits. BROOKE equally x4, sensation to light touch intact throughout. NIHSS = 0.   MSK: FROMx4, no focal bony tenderness, no peripheral edema  Skin: No rash

## 2023-05-01 NOTE — ED ADULT NURSE REASSESSMENT NOTE - NS ED NURSE REASSESS COMMENT FT1
patient resting in bed comfortably. alert and oriented x 4, perrl, respirations even and unlabored, ms strength 5/5 upper and lower ext bilaterally. ambulatory with assistance to bathroom, voided small amount and missed urine collection container. will reattempt urine collection during next void. no complaints at this time. admitted, awaiting bed placement. will continue to monitor.

## 2023-05-01 NOTE — ED PROVIDER NOTE - OBJECTIVE STATEMENT
82 y/o female with PMHx of HLD and HTN presents to the ED with daughter. Daughter reports 1 hour ago pt was sitting on the couch when she made a "weird" sound and turned to her side. Her RUE suddenly became rigid and pt was unresponsive. After pt came to, she vomited once and c/o feeling fatigued and weak, was speaking normally. Pt does not recall episode. Was sent by PCP to ED to r/o stroke. Daughter denies urinary/bowel incontinence. Reports pt fell about 1 month ago. BFAST +. 82 y/o female with PMHx of HLD and HTN presents to the ED with daughter. Daughter reports 1 hour ago pt was sitting on the couch when she made a "weird" sound and turned to her side. Her RUE suddenly became rigid and pt was unresponsive. Episode lasted about 1 minute. After pt came to, she vomited once and c/o feeling fatigued and weak, was speaking normally. Pt does not recall episode. Was sent by PCP to ED to r/o stroke. Daughter denies urinary/bowel incontinence. Reports pt fell about 1 month ago, had head injury then. BFAST +. Neuro alert activated at triage.

## 2023-05-01 NOTE — ED ADULT TRIAGE NOTE - CHIEF COMPLAINT QUOTE
SIB  MD AZEVEDO FOR SUDDEN ONSET OF AMSX1 HOUR AGO, RIGID POSTURE, WEAKNESS ON LEFT SIDE. PT IS IN TRIAGE AT THIS MOMENT SHE IS BACK TO BASELINE ABLE TO ANSWER ALL QUESTIONS., ABLE TO STAND AND RESPOND TO QUESTIONS. MD NEWELL AT BEDSIDE. nEURO ALERT CALLED.@[phone]

## 2023-05-01 NOTE — ED PROVIDER NOTE - NS ED ROS FT
Constitutional: + Fatigue, no fevers, chills, or sweats.  Cardiac: No chest pain, exertional dyspnea, orthopnea  Respiratory: No shortness of breath, no cough  GI: + Vomiting  Neuro: No headaches, no neck pain/stiffness, no numbness  All other systems reviewed and are negative unless otherwise stated in the HPI.

## 2023-05-01 NOTE — PHARMACOTHERAPY INTERVENTION NOTE - COMMENTS
Medication reconciliation completed.  Reviewed Medication list and confirmed med allergies with patient; confirmed with Dr. First Mededmund.

## 2023-05-01 NOTE — CONSULT NOTE ADULT - SUBJECTIVE AND OBJECTIVE BOX
Neurology Consult requested by:   Patient is a 83y old  Female who presents with a chief complaint of    HPI:      PAST MEDICAL & SURGICAL HISTORY:  UTI (urinary tract infection)        FAMILY HISTORY:    Social Hx:  Nonsmoker, no drug or alcohol use  Medications and Allergies ReviewedMEDICATIONS  (STANDING):     ROS: Pertinent positives in HPI, all other ROS were reviewed and are negative.      Examination:   Vital Signs Last 24 Hrs  T(C): 36.5 (01 May 2023 15:40), Max: 36.5 (01 May 2023 15:40)  T(F): 97.7 (01 May 2023 15:40), Max: 97.7 (01 May 2023 15:40)  HR: 67 (01 May 2023 15:40) (67 - 67)  BP: 133/70 (01 May 2023 15:40) (133/70 - 133/70)  BP(mean): 89 (01 May 2023 15:40) (89 - 89)  RR: 13 (01 May 2023 15:40) (13 - 13)  SpO2: 100% (01 May 2023 15:40) (100% - 100%)    Parameters below as of 01 May 2023 15:40  Patient On (Oxygen Delivery Method): room air      General: Cooperative, NAD   NECK: supple, no masses  ENT: Normal hearing   Vascular : no carotid bruits,   Lungs: CTAB  Chest: RRR, no murmurs  Extremities: nontender, no edema  Musculoskeletal: no adventitious movements, no joint stiffness  Skin: no rash    Neurological Examination:  NIHSS:  MS: AOx3. Appropriately interactive, normal affect. Speech fluent w/o paraphasic error, repetition, naming, reading is intact   CN: No Papilledema, PERLL, EOMI, V1-3 sensation intact, face symmetric, hearing intact, palate elevates symmetrically, tongue midline, SCM equal bilaterally  Motor: normal bulk and tone, no tremor, rigidity or bradykinesia.  5/5 all over   Sens: Intact to light touch.    Reflexes: 2/4 all over, downgoing toes b/l  Coord:  No dysmetria, KARO intact   Gait: Cannot test    Labs: Reviewed  Complete Blood Count + Automated Diff (05.01.23 @ 16:00)   WBC Count: 7.10 K/uL  RBC Count: 4.71 M/uL  Hemoglobin: 14.3 g/dL  Hematocrit: 42.6 %  Mean Cell Volume: 90.4 fl  Mean Cell Hemoglobin: 30.4 pg  Mean Cell Hemoglobin Conc: 33.6 gm/dL  Red Cell Distrib Width: 13.9 %  Platelet Count - Automated: 243 K/uL      < from: CT Head No Cont (05.01.23 @ 15:42) >  FINDINGS:  The ventricles and sulci are prominent, compatible with age-related   generalized cerebral volume loss.   There is no CT evidence for acute   cerebral cortical infarct. There is no evidence of hemorrhage. There is   periventricular and subcortical white matter hypoattenuation,  most   compatible with chronic microvascular ischemic changes.   No mass effect   is found in the brain. There is no midline shift or herniation pattern.      The visualized portions of the paranasal sinuses and mastoid air cells   are clear.    IMPRESSION:    No evidence of acute intracranial abnormality.  No evidence of hemorrhage.    < end of copied text >            Imaging: Reviewed    A/P:    No IV tpa given because…  Total Critical Care Time spent:   Neurology Consult requested by:   Patient is a 83y old  Female who presents with a chief complaint of    HPI:  82 y/o woman with PMHx of HLD, HTN, depression presents to the ED with daughter, who reports earlier today, pt was sitting on the couch when she made a "weird" sound and turned to her side. Her RUE suddenly became rigid and pt was unresponsive. Episode lasted about 1 minute. After pt came to, she vomited once, felt cold, clammy, and fatigued,. Daughter reports was moving all extremities, seemed back to herself. Pt does not recall episode, no preceeding sxs, was at  3/2023 after fall, also then doesn't recall why she felled.     PAST MEDICAL & SURGICAL HISTORY:  UTI (urinary tract infection)        FAMILY HISTORY:    Social Hx:  Nonsmoker, no drug or alcohol use  Medications and Allergies ReviewedMEDICATIONS  (STANDING):     ROS: Pertinent positives in HPI, all other ROS were reviewed and are negative.      Examination:   Vital Signs Last 24 Hrs  T(C): 36.5 (01 May 2023 15:40), Max: 36.5 (01 May 2023 15:40)  T(F): 97.7 (01 May 2023 15:40), Max: 97.7 (01 May 2023 15:40)  HR: 67 (01 May 2023 15:40) (67 - 67)  BP: 133/70 (01 May 2023 15:40) (133/70 - 133/70)  BP(mean): 89 (01 May 2023 15:40) (89 - 89)  RR: 13 (01 May 2023 15:40) (13 - 13)  SpO2: 100% (01 May 2023 15:40) (100% - 100%)    Parameters below as of 01 May 2023 15:40  Patient On (Oxygen Delivery Method): room air      General: Cooperative, NAD   NECK: supple, no masses  ENT: Normal hearing   Vascular : no carotid bruits,   Lungs: CTAB  Chest: RRR, no murmurs  Extremities: nontender, no edema  Musculoskeletal: no adventitious movements, no joint stiffness  Skin: no rash    Neurological Examination:  NIHSS:0  MS: AOx3. Appropriately interactive, normal affect. Speech fluent w/o paraphasic error, repetition, naming is intact   CN: VFFTC, PERLL, EOMI, V1-3 sensation intact, face symmetric, hearing intact, palate elevates symmetrically, tongue midline, SCM equal bilaterally  Motor: normal bulk and tone, no tremor, rigidity or bradykinesia. No drift of the extremities, strength 4/5 all over   Sens: Intact to light touch.    Reflexes: 0-1/4 all over, downgoing toes b/l  Coord:  No dysmetria, KARO intact   Gait: Cannot test    Labs: Reviewed  Complete Blood Count + Automated Diff (05.01.23 @ 16:00)   WBC Count: 7.10 K/uL  RBC Count: 4.71 M/uL  Hemoglobin: 14.3 g/dL  Hematocrit: 42.6 %  Mean Cell Volume: 90.4 fl  Mean Cell Hemoglobin: 30.4 pg  Mean Cell Hemoglobin Conc: 33.6 gm/dL  Red Cell Distrib Width: 13.9 %  Platelet Count - Automated: 243 K/uL      < from: CT Head No Cont (05.01.23 @ 15:42) >  FINDINGS:  The ventricles and sulci are prominent, compatible with age-related   generalized cerebral volume loss.   There is no CT evidence for acute   cerebral cortical infarct. There is no evidence of hemorrhage. There is   periventricular and subcortical white matter hypoattenuation,  most   compatible with chronic microvascular ischemic changes.   No mass effect   is found in the brain. There is no midline shift or herniation pattern.      The visualized portions of the paranasal sinuses and mastoid air cells   are clear.    IMPRESSION:    No evidence of acute intracranial abnormality.  No evidence of hemorrhage.    < end of copied text >            Imaging: Reviewed    A/P:    No IV tpa given because…  Total Critical Care Time spent:

## 2023-05-01 NOTE — ED PROVIDER NOTE - NSCAREINITIATED _GEN_ER
Annette Mckeon is a 23 year old male.  Chief Complaint   Patient presents with   • Refill Request     22 y/o male presents to clinic for medication refill. Patient has h/o asthma and ran out of inhaler. Denies SOB, Wheezing, or chest pain.     Asthma  There is no chest tightness, cough, difficulty breathing, hoarse voice or shortness of breath. His past medical history is significant for asthma.     Patient's medications, allergies, past medical, surgical, and social history  were reviewed and updated as appropriate.  Current Outpatient Medications   Medication Sig Dispense Refill   • albuterol 108 (90 Base) MCG/ACT inhaler Inhale 2 puffs into the lungs every 4 hours as needed for Shortness of Breath or Wheezing. 1 each 0   • predniSONE (DELTASONE) 20 MG tablet Take 2 tablets by mouth daily. 10 tablet 0     No current facility-administered medications for this visit.     ALLERGIES:   Allergen Reactions   • Shellfish Allergy   (Food Or Med) THROAT SWELLING   • Shellfish-Derived Products   (Food Or Med) ANAPHYLAXIS   • Eggs Or Egg-Derived Products   (Food Or Med) Other (See Comments)     food allergy -eggs     Past Medical History:   Diagnosis Date   • RAD (reactive airway disease)      History reviewed. No pertinent surgical history.  Social History     Tobacco Use   Smoking Status Not on file   Smokeless Tobacco Not on file     Social History     Substance and Sexual Activity   Alcohol Use None     [unfilled]  Review of Systems   Constitutional: Negative.    HENT: Negative.  Negative for hoarse voice.    Respiratory: Negative.  Negative for cough and shortness of breath.    Cardiovascular: Negative.    Neurological: Negative.      Objective   Visit Vitals  /80 (BP Location: LUE - Left upper extremity, Patient Position: Sitting, Cuff Size: Regular)   Pulse 66   Temp 98.1 °F (36.7 °C) (Temporal)   Resp 19   Ht 5' 10.47\" (1.79 m)   Wt 81 kg (178 lb 9.2 oz)   SpO2 99%   BMI 25.28 kg/m²     Physical  Exam  Vitals reviewed.   Constitutional:       Appearance: Normal appearance.   HENT:      Head: Normocephalic.   Cardiovascular:      Rate and Rhythm: Normal rate.   Neurological:      General: No focal deficit present.      Mental Status: He is alert.       Assessment   Mild Intermittent Asthma without complications  Medication Refill  Return if symptoms worsen or fail to improve.    Verbal and written patient education provided related to diagnosis.  See attached patient handouts.  All questions answered to patient's satisfaction.    Follow up with your Primary Care Provider or this clinic if symptoms persist or worsen.  Thank you for visiting [facility].     Ezra Hunt(Attending)

## 2023-05-01 NOTE — H&P ADULT - ASSESSMENT
Pt is admitted w/    Syncope  Possible seizure       DVT : lovenox  Full Code,  MOLST completed with pt Pt is admitted w/    Syncope  Possible seizure, Convulsion?    CVA less likely  Fall in March 2023  HTN Hx  Anxiety Depression on Effexor  UTI Hx    - CTs , MRI reviewed  - s/p 1 L NS in the ED   - Admit to telemetry, eval for arrythmia  - Seizure precautions  - obtain ua , u cx  - Neurochecks Q 4 hours  - lipid panel, HgA1C, fasting BMP in AM  - video EEG 24 hour  - apprec Neurology consult  - cardiology consult  - DVT proph : lovenox  -  Full Code,  MOLST completed with pt

## 2023-05-01 NOTE — H&P ADULT - CONVERSATION DETAILS
Pt states she is Full Code and would want a trial CPR,  trial of intubation, IVF, antibiotics.  She states her children would help with decision making as HCP,  #1. Nyasia (daughter) , #2. Howard (son),  #3. Ash (son). Pt states she is Full Code and would want a trial CPR,  trial of intubation, IVF, antibiotics.  She states her children would help with decision making as her HCP.  In the ED pt stated  #1. Nyasia (daughter) , #2. Howard (son),  #3. Ash (son).         I contacted pt's daughter Nyasia,  via phone [phone].  Nyasia states they have legal paperwork at home and she believes,  her brother Howard is the 1st HC Proxy,  followed by Nyasia and Ash.

## 2023-05-01 NOTE — ED ADULT NURSE NOTE - OBJECTIVE STATEMENT
83y female presents to the ED A/Ox4, brought by daughter reporting that pt made a weird sound and became rigid, rolled her eyes and was unresponsive. Pt threw up after and was confused. Pt is not acting within baseline c/o of headache.

## 2023-05-01 NOTE — H&P ADULT - NSHPSOCIALHISTORY_GEN_ALL_CORE
No tob/ETOH/drug use.  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. Pt lives with her  and her son Ash.  No tob/ETOH/drug use.  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

## 2023-05-01 NOTE — H&P ADULT - HISTORY OF PRESENT ILLNESS
: 82 y/o female with PMHx of HLD and HTN presents to the ED with daughter. Daughter reports 1 hour ago pt was sitting on the couch when she made a "weird" sound and turned to her side. Her RUE suddenly became rigid and pt was unresponsive. Episode lasted about 1 minute. After pt came to, she vomited once and c/o feeling fatigued and weak, was speaking normally. Pt does not recall episode. Was sent by PCP to ED to r/o stroke. Daughter denies urinary/bowel incontinence. Reports pt fell about 1 month ago, had head injury then. BFAST +. Neuro alert activated at triage.   Pt is a pleasant  82 y/o female with PMHx of HLD,  HTN, UTI, Anxiety/Depression on Effexor ,  recent admission for witnessed fall and head injury last month who  presented  to [place]  ED with her daughter, Nyasia.   Pt's  daughter reported that  around 1:30 pm in the afternoon,  pt was sitting on the couch when she made a "weird" sound and turned to her side. Her right arm became rigid and pt was unresponsive for about one minute.    Once pt  became alert , she vomited once and c/o feeling fatigued and weak.  Pt's  speech  was normal,  and she did not recall the episode.   No tongue biting or incontinence.   Pt did have breakfast and has been in her usual health.     PCP  advised pt to go to the ED to evaluate for possible stroke.  In the ED , pt was BFAST positive and had Neuro alert activated at triage.  Pt denies chest pain/palpitations, no SOB/abd pain,  no n/v/d,  no  URI or UTI symptoms,  no fever/chills.  Pt reports an occas dry cough and generalized arthritis.

## 2023-05-01 NOTE — ED PROVIDER NOTE - NSICDXPASTMEDICALHX_GEN_ALL_CORE_FT
PAST MEDICAL HISTORY:  UTI (urinary tract infection)       HLD (hyperlipidemia)     HTN (hypertension)

## 2023-05-01 NOTE — CONSULT NOTE ADULT - ASSESSMENT
83 year old woman s/p LOC, non focal exam. CT head shows no acute changes. ? seizure, ? convulsive syncope. Less likely CVA.  Suggest:  admit to monitored bed  neuro checks q 2 hours  seizure precautions  f/u labs, lipids, HGA1C  f/u B/p  MR head wo  carotid dopplers  video EEG for 24 hurs

## 2023-05-02 ENCOUNTER — TRANSCRIPTION ENCOUNTER (OUTPATIENT)
Age: 83
End: 2023-05-02

## 2023-05-02 LAB
A1C WITH ESTIMATED AVERAGE GLUCOSE RESULT: 5.1 % — SIGNIFICANT CHANGE UP (ref 4–5.6)
APPEARANCE UR: CLEAR — SIGNIFICANT CHANGE UP
BACTERIA # UR AUTO: ABNORMAL
BILIRUB UR-MCNC: NEGATIVE — SIGNIFICANT CHANGE UP
BUN SERPL-MCNC: 18 MG/DL — SIGNIFICANT CHANGE UP (ref 7–23)
CALCIUM SERPL-MCNC: 8.6 MG/DL — SIGNIFICANT CHANGE UP (ref 8.5–10.1)
CHLORIDE SERPL-SCNC: 109 MMOL/L — HIGH (ref 96–108)
CHOLEST SERPL-MCNC: 196 MG/DL — SIGNIFICANT CHANGE UP
CO2 SERPL-SCNC: 30 MMOL/L — SIGNIFICANT CHANGE UP (ref 22–31)
COLOR SPEC: YELLOW — SIGNIFICANT CHANGE UP
CREAT SERPL-MCNC: 0.55 MG/DL — SIGNIFICANT CHANGE UP (ref 0.5–1.3)
DIFF PNL FLD: ABNORMAL
EGFR: 90 ML/MIN/1.73M2 — SIGNIFICANT CHANGE UP
EPI CELLS # UR: SIGNIFICANT CHANGE UP
ESTIMATED AVERAGE GLUCOSE: 100 MG/DL — SIGNIFICANT CHANGE UP (ref 68–114)
GLUCOSE SERPL-MCNC: 93 MG/DL — SIGNIFICANT CHANGE UP (ref 70–99)
GLUCOSE UR QL: NEGATIVE — SIGNIFICANT CHANGE UP
HDLC SERPL-MCNC: 109 MG/DL — SIGNIFICANT CHANGE UP
KETONES UR-MCNC: NEGATIVE — SIGNIFICANT CHANGE UP
LEUKOCYTE ESTERASE UR-ACNC: ABNORMAL
LIPID PNL WITH DIRECT LDL SERPL: 78 MG/DL — SIGNIFICANT CHANGE UP
NITRITE UR-MCNC: NEGATIVE — SIGNIFICANT CHANGE UP
NON HDL CHOLESTEROL: 88 MG/DL — SIGNIFICANT CHANGE UP
PH UR: 5 — SIGNIFICANT CHANGE UP (ref 5–8)
POTASSIUM SERPL-MCNC: 3.5 MMOL/L — SIGNIFICANT CHANGE UP (ref 3.5–5.3)
POTASSIUM SERPL-SCNC: 3.5 MMOL/L — SIGNIFICANT CHANGE UP (ref 3.5–5.3)
PROT UR-MCNC: NEGATIVE — SIGNIFICANT CHANGE UP
RBC CASTS # UR COMP ASSIST: SIGNIFICANT CHANGE UP /HPF (ref 0–4)
SODIUM SERPL-SCNC: 139 MMOL/L — SIGNIFICANT CHANGE UP (ref 135–145)
SP GR SPEC: 1.02 — SIGNIFICANT CHANGE UP (ref 1.01–1.02)
TRIGL SERPL-MCNC: 46 MG/DL — SIGNIFICANT CHANGE UP
UROBILINOGEN FLD QL: NEGATIVE — SIGNIFICANT CHANGE UP
WBC UR QL: ABNORMAL /HPF (ref 0–5)

## 2023-05-02 PROCEDURE — 99232 SBSQ HOSP IP/OBS MODERATE 35: CPT

## 2023-05-02 RX ORDER — MIDAZOLAM HYDROCHLORIDE 1 MG/ML
10 INJECTION, SOLUTION INTRAMUSCULAR; INTRAVENOUS ONCE
Refills: 0 | Status: DISCONTINUED | OUTPATIENT
Start: 2023-05-02 | End: 2023-05-02

## 2023-05-02 RX ORDER — ENOXAPARIN SODIUM 100 MG/ML
30 INJECTION SUBCUTANEOUS EVERY 24 HOURS
Refills: 0 | Status: DISCONTINUED | OUTPATIENT
Start: 2023-05-02 | End: 2023-05-04

## 2023-05-02 RX ADMIN — SODIUM CHLORIDE 3 MILLILITER(S): 9 INJECTION INTRAMUSCULAR; INTRAVENOUS; SUBCUTANEOUS at 06:46

## 2023-05-02 RX ADMIN — Medication 150 MILLIGRAM(S): at 09:28

## 2023-05-02 RX ADMIN — ENOXAPARIN SODIUM 30 MILLIGRAM(S): 100 INJECTION SUBCUTANEOUS at 09:27

## 2023-05-02 RX ADMIN — AMLODIPINE BESYLATE 10 MILLIGRAM(S): 2.5 TABLET ORAL at 09:28

## 2023-05-02 RX ADMIN — ATORVASTATIN CALCIUM 20 MILLIGRAM(S): 80 TABLET, FILM COATED ORAL at 22:08

## 2023-05-02 RX ADMIN — SODIUM CHLORIDE 3 MILLILITER(S): 9 INJECTION INTRAMUSCULAR; INTRAVENOUS; SUBCUTANEOUS at 17:58

## 2023-05-02 RX ADMIN — SODIUM CHLORIDE 3 MILLILITER(S): 9 INJECTION INTRAMUSCULAR; INTRAVENOUS; SUBCUTANEOUS at 11:35

## 2023-05-02 NOTE — PHYSICAL THERAPY INITIAL EVALUATION ADULT - PERTINENT HX OF CURRENT PROBLEM, REHAB EVAL
Pt is an 83 year old female presenting after episode of unresponsiveness. CT head/MR  showed no acute changes. Undergoing EEG. PMH listed below.

## 2023-05-02 NOTE — PATIENT PROFILE ADULT - HEALTH LITERACY
[FreeTextEntry1] : Jocleynn will follow me in January 2021 upon completion of surveillance CT scan. no

## 2023-05-02 NOTE — DIETITIAN INITIAL EVALUATION ADULT - OTHER INFO
Pt is a pleasant  82 y/o female with PMHx of HLD,  HTN, UTI, Anxiety/Depression on Effexor, recent admission for witnessed fall and head injury last month who  presented to [place]  ED with her daughter, Nyasia. Pt's daughter reported that pt was sitting on the couch when she made a "weird" sound and turned to her side. Her right arm became rigid and pt was unresponsive for about one minute. Once pt became alert, she vomited once and c/o feeling fatigued and weak.  Admit for syncope, possible seizure     Unable to obtain diet / wt hx 2/2 appears confused. EEG x 24 hrs; 2/2 CT head/MR showed no acute changes, no sign stenosis. ? seizure, ? convulsive syncope. Is full code; unable to determine how well pt has been eating since admit (x 1 day). Bed scale wt of 117# taken by RD on 5/2/23. NFPE reveals moderate-severe muscle/ fat wasting - pt appears thin, frail, and tara. Liberalize diet to regular to maximize caloric and nutrient intake. Add ensure + HP shake BID (350kcal, 20g protein). See below for other recommendations.

## 2023-05-02 NOTE — DISCHARGE NOTE NURSING/CASE MANAGEMENT/SOCIAL WORK - NSDCVIVACCINE_GEN_ALL_CORE_FT
Tdap; 15-Mar-2023 18:56; Ayana Gonzales (YES[facility]); [employer]; [id number] (Exp. Date: 08-Dec-2024); IntraMuscular; Deltoid Left.; 0.5 milliLiter(s); VIS (VIS Published: 09-May-2013, VIS Presented: 15-Mar-2023);

## 2023-05-02 NOTE — PHYSICAL THERAPY INITIAL EVALUATION ADULT - GENERAL OBSERVATIONS, REHAB EVAL
Pt received semi-supine in bed, +EEG, +telemetry, in NAD. No family present. Pt agreeable to participate in PT evaluation.

## 2023-05-02 NOTE — DIETITIAN INITIAL EVALUATION ADULT - ORAL INTAKE PTA/DIET HISTORY
Unable to obtain diet / wt hx 2/2 appears confused. As per EMR, w/ fatigue, weakness, and 1 episode of vomiting. Likely consuming <75% of ENN.

## 2023-05-02 NOTE — PHYSICAL THERAPY INITIAL EVALUATION ADULT - ADDITIONAL COMMENTS
Pt lives in a private house with spouse, son and daughter in-law. House is equipped with a stair chair lift. Pt reports ambulating with a rolling walker. Carries out ADLs independently.

## 2023-05-02 NOTE — DIETITIAN INITIAL EVALUATION ADULT - ADD RECOMMEND
1) Liberalize diet to regular to maximize caloric and nutrient intake.  2) Add ensure + HP shake BID (350kcal, 20g protein)  3) Monitor bowel movements, if no BM for >3 days, consider implementing bowel regimen.  4) MVI w/ minerals daily to ensure 100% RDA met  5) Consider adding thiamine 100 mg daily 2/2 poor PO intake/ malnutrition  6) Encourage protein-rich foods, maximize food preferences  7) Meal encouragement; tray set-up to increase po intake  8) Confirm goals of care regarding nutrition support  RD will continue to monitor PO intake, labs, hydration, and wt prn.

## 2023-05-02 NOTE — PATIENT PROFILE ADULT - FALL HARM RISK - HARM RISK INTERVENTIONS

## 2023-05-02 NOTE — DIETITIAN INITIAL EVALUATION ADULT - PERTINENT LABORATORY DATA
05-02    139  |  109<H>  |  18  ----------------------------<  93  3.5   |  30  |  0.55    Ca    8.6      02 May 2023 07:35    TPro  7.1  /  Alb  3.5  /  TBili  0.3  /  DBili  x   /  AST  21  /  ALT  30  /  AlkPhos  80  05-01  A1C with Estimated Average Glucose Result: 5.1 % (05-02-23 @ 07:35)

## 2023-05-02 NOTE — DISCHARGE NOTE NURSING/CASE MANAGEMENT/SOCIAL WORK - PATIENT PORTAL LINK FT
You can access the FollowMyHealth Patient Portal offered by [facility] by registering at the following website: [email or web address]. By joining [facility]’s FollowMyHealth portal, you will also be able to view your health information using other applications (apps) compatible with our system.

## 2023-05-02 NOTE — PHYSICAL THERAPY INITIAL EVALUATION ADULT - PATIENT PROFILE REVIEW, REHAB EVAL
PT evaluate and treat orders received: Ambulate with assistance, Out of bed with assistance, Ambulate as tolerated. Consult with YESICA Childs, pt may participate in PT evaluation./yes

## 2023-05-02 NOTE — DIETITIAN INITIAL EVALUATION ADULT - PERTINENT MEDS FT
MEDICATIONS  (STANDING):  amLODIPine   Tablet 10 milliGRAM(s) Oral daily  atorvastatin 20 milliGRAM(s) Oral at bedtime  enoxaparin Injectable 30 milliGRAM(s) SubCutaneous every 24 hours  sodium chloride 0.9% lock flush 3 milliLiter(s) IV Push four times a day  venlafaxine XR. 150 milliGRAM(s) Oral daily    MEDICATIONS  (PRN):  LORazepam   Injectable 2 milliGRAM(s) IV Push once PRN Seizure Activity

## 2023-05-03 PROCEDURE — 99232 SBSQ HOSP IP/OBS MODERATE 35: CPT

## 2023-05-03 PROCEDURE — 93306 TTE W/DOPPLER COMPLETE: CPT | Mod: 26

## 2023-05-03 PROCEDURE — 95720 EEG PHY/QHP EA INCR W/VEEG: CPT

## 2023-05-03 RX ADMIN — SODIUM CHLORIDE 3 MILLILITER(S): 9 INJECTION INTRAMUSCULAR; INTRAVENOUS; SUBCUTANEOUS at 18:44

## 2023-05-03 RX ADMIN — Medication 150 MILLIGRAM(S): at 10:07

## 2023-05-03 RX ADMIN — ATORVASTATIN CALCIUM 20 MILLIGRAM(S): 80 TABLET, FILM COATED ORAL at 21:24

## 2023-05-03 RX ADMIN — SODIUM CHLORIDE 3 MILLILITER(S): 9 INJECTION INTRAMUSCULAR; INTRAVENOUS; SUBCUTANEOUS at 11:46

## 2023-05-03 RX ADMIN — SODIUM CHLORIDE 3 MILLILITER(S): 9 INJECTION INTRAMUSCULAR; INTRAVENOUS; SUBCUTANEOUS at 00:50

## 2023-05-03 RX ADMIN — SODIUM CHLORIDE 3 MILLILITER(S): 9 INJECTION INTRAMUSCULAR; INTRAVENOUS; SUBCUTANEOUS at 05:36

## 2023-05-03 RX ADMIN — AMLODIPINE BESYLATE 10 MILLIGRAM(S): 2.5 TABLET ORAL at 10:07

## 2023-05-03 RX ADMIN — ENOXAPARIN SODIUM 30 MILLIGRAM(S): 100 INJECTION SUBCUTANEOUS at 10:07

## 2023-05-03 NOTE — PROGRESS NOTE ADULT - SUBJECTIVE AND OBJECTIVE BOX
< from: MR Head No Cont (05.01.23 @ 19:24) >  COMPARISON: CT brain from earlier today.    FINDINGS:  There is no evidence of acute infarct, acute intracranial hemorrhage,   mass effect or hydrocephalus. No extra-axial collection. Basal cisterns   are patent.    Age-related involutional changes and chronic microvascular ischemic   changes.    Ventricles and sulci are age-appropriate in size.    Major intracranial flow-voids are preserved.    Left cataract surgery. The orbits, sellar and suprasellar structures, and   craniocervical junction are otherwise unremarkable. Visualized paranasal   sinuses and mastoid air cells are clear.    IMPRESSION:  No acute infarct, acute intracranial hemorrhage, or mass effect.    < end of copied text >  HPI:  82 y/o woman with PMHx of HLD,  HTN, UTI, Anxiety/Depression on Effexor,  presented  to [place]  ED with her daughter, episode of unresponsiveness.     MEDICATIONS  (STANDING):  amLODIPine   Tablet 10 milliGRAM(s) Oral daily  atorvastatin 20 milliGRAM(s) Oral at bedtime  enoxaparin Injectable 30 milliGRAM(s) SubCutaneous every 24 hours  sodium chloride 0.9% lock flush 3 milliLiter(s) IV Push four times a day  venlafaxine XR. 150 milliGRAM(s) Oral daily    MEDICATIONS  (PRN):  LORazepam   Injectable 2 milliGRAM(s) IV Push once PRN Seizure Activity      Vital Signs Last 24 Hrs  T(C): 36.7 (05-02-23 @ 08:43), Max: 36.9 (05-01-23 @ 23:33)  T(F): 98.1 (05-02-23 @ 08:43), Max: 98.4 (05-01-23 @ 23:33)  HR: 69 (05-02-23 @ 08:43) (67 - 75)  BP: 139/56 (05-02-23 @ 08:43) (125/65 - 139/56)  BP(mean): 80 (05-01-23 @ 23:33) (80 - 89)  RR: 18 (05-02-23 @ 08:43) (13 - 20)  SpO2: 97% (05-02-23 @ 08:43) (97% - 100%)      Neurological Exam:    HF: Patient is alert and oriented x 3. There is no aphasia or dysarthria. Follows complex commands.   CN: Vision is intact to confrontation. Pupils are equal and reactive. Extra ocular muscles are intact. There is no facial droop or asymmetry. Tongue is midline. Sensation is intact in the face. Other CN II-XII are intact.   Motor: motor examination all muscles are 4/5 and there is no pronator drift.   Sensory: intact to  touch.   DTR: 0-1/4 all 4 extremities. Babinski is negative bilateral.  Co-ord:  Finger to finger to nose is intact. Heel to shin is intact bilaterally.     < from: MR Head No Cont (05.01.23 @ 19:24) >  FINDINGS:  There is no evidence of acute infarct, acute intracranial hemorrhage,   mass effect or hydrocephalus. No extra-axial collection. Basal cisterns   are patent.    Age-related involutional changes and chronic microvascular ischemic   changes.    Ventricles and sulci are age-appropriate in size.    Major intracranial flow-voids are preserved.    Left cataract surgery. The orbits, sellar and suprasellar structures, and   craniocervical junction are otherwise unremarkable. Visualized paranasal   sinuses and mastoid air cells are clear.    IMPRESSION:  No acute infarct, acute intracranial hemorrhage, or mass effect.    < end of copied text >    < from: US Duplex Carotid Arteries Complete, Bilateral (05.01.23 @ 17:34) >  Antegrade flow is noted within both vertebral arteries.    IMPRESSION:    Bilateral plaque. No significant hemodynamic stenosis of either internal   carotid artery.    < end of copied text >  
Patient seen and examined  resting in bed  for video EEG-> negative for seizure  vitals stable  MRI negative, carotid doppler negative. U/A negative  Urine culture ordered and pending. Afebrile  TTE and Cardiology evaluation for syncope        Objective:  Vitals  T(C): 36.5 (05-03-23 @ 15:53), Max: 37.4 (05-02-23 @ 23:23)  HR: 74 (05-03-23 @ 21:06) (69 - 74)  BP: 117/59 (05-03-23 @ 15:53) (107/58 - 119/60)  RR: 18 (05-03-23 @ 15:53) (16 - 18)  SpO2: 95% (05-03-23 @ 15:53) (95% - 98%)    Physical Exam:  General: comfortable, no acute distress  HEENT: Atraumatic, no LAD, trachea midline, PERRLA  Cardiovascular: normal s1s2, no murmurs, gallops or fricition rubs  Pulmonary: clear to ausculation Bilaterally, no wheezing , rhonchi  Gastrointestinal: soft non tender non distended, no masses felt, no organomegally  Muscloskeletal: no lower extremity edema, intact bilateral lower extremity pulses  Neurological: CN II-12 intact. No focal weakness  Psychiatrical: normal mood, cooperative  SKIN: no rash, lesions or ulcers    Labs:      05-02    139  |  109<H>  |  18  ----------------------------<  93  3.5   |  30  |  0.55    Ca    8.6      02 May 2023 07:35        CAPILLARY BLOOD GLUCOSE                                14.3   7.10  )-----------( 243      ( 01 May 2023 16:00 )             42.6     05-02    139  |  109<H>  |  18  ----------------------------<  93  3.5   |  30  |  0.55    Ca    8.6      02 May 2023 07:35    TPro  7.1  /  Alb  3.5  /  TBili  0.3  /  DBili  x   /  AST  21  /  ALT  30  /  AlkPhos  80  05-01    LIVER FUNCTIONS - ( 01 May 2023 16:00 )  Alb: 3.5 g/dL / Pro: 7.1 gm/dL / ALK PHOS: 80 U/L / ALT: 30 U/L / AST: 21 U/L / GGT: x           PT/INR - ( 01 May 2023 16:00 )   PT: 11.4 sec;   INR: 0.98 ratio         PTT - ( 01 May 2023 16:00 )  PTT:26.0 sec    RADIOLOGY  < from: MR Head No Cont (05.01.23 @ 19:24) >    IMPRESSION:  No acute infarct, acute intracranial hemorrhage, or mass effect.    < end of copied text >  < from: US Duplex Carotid Arteries Complete, Bilateral (05.01.23 @ 17:34) >  IMPRESSION:    Bilateral plaque. No significant hemodynamic stenosis of either internal   carotid artery.    < end of copied text >  < from: Xray Chest 1 View- PORTABLE-Urgent (Xray Chest 1 View- PORTABLE-Urgent .) (05.01.23 @ 16:55) >  FINDINGS/  IMPRESSION:  No consolidation or infiltrate.  No pleural effusion. Right axillary   surgical clips noted    Heart size within normal limits.    < end of copied text >  < from: CT Head No Cont (05.01.23 @ 15:42) >  IMPRESSION:    No evidence of acute intracranial abnormality.  No evidence of hemorrhage.    Chronic changesas above.    < end of copied text >      EKG:    < from: 12 Lead ECG (05.01.23 @ 15:40) >    Diagnosis Line *** Poor data quality, interpretation may be adversely affected  Sinus rhythm with 1st degree A-V block  Right bundle branch block  Abnormal ECG  When compared with ECG of 15-MAR-2023 18:53,  No significant change was found    < end of copied text >      
HPI:  82 y/o woman PMHx of HLD,  HTN, UTI, Anxiety/Depression on Effexor ,  presented with episode of  unresponsiveness for about one minute.  Today c/o stomach ache, no further episodes.    MEDICATIONS  (STANDING):  amLODIPine   Tablet 10 milliGRAM(s) Oral daily  atorvastatin 20 milliGRAM(s) Oral at bedtime  enoxaparin Injectable 30 milliGRAM(s) SubCutaneous every 24 hours  sodium chloride 0.9% lock flush 3 milliLiter(s) IV Push four times a day  venlafaxine XR. 150 milliGRAM(s) Oral daily    MEDICATIONS  (PRN):  LORazepam   Injectable 2 milliGRAM(s) IV Push once PRN Seizure Activity      Vital Signs Last 24 Hrs  T(C): 36.8 (03 May 2023 09:15), Max: 37.4 (02 May 2023 23:23)  T(F): 98.3 (03 May 2023 09:15), Max: 99.3 (02 May 2023 23:23)  HR: 69 (03 May 2023 09:15) (69 - 71)  BP: 116/52 (03 May 2023 09:15) (107/58 - 119/60)  BP(mean): 69 (03 May 2023 09:15) (69 - 74)  RR: 18 (03 May 2023 09:15) (16 - 18)  SpO2: 96% (03 May 2023 09:15) (96% - 98%)    Parameters below as of 03 May 2023 09:15  Patient On (Oxygen Delivery Method): room air      Neurological Exam:    HF: Patient is alert and oriented x 3. There is no aphasia or dysarthria. Follows complex commands.   CN: Pupils are equal and reactive. Extra ocular muscles are intact. There is no facial droop or asymmetry. Tongue is midline. Sensation is intact in the face. Other CN II-XII are intact.   Motor: motor examination all muscles are 5-/5 and there is no pronator drift.   Sensory: intact to touch.   DTR: 0-1/4 all 4 extremities. Babinski is negative bilateral.  Co-ord:  Finger to finger to nose is intact. Heel to shin is intact bilaterally.   Gait/balance: Patient ambulates without difficulty.     Basic Metabolic Panel in AM (05.02.23 @ 07:35)   Sodium, Serum: 139 mmol/L  Potassium, Serum: 3.5 mmol/L  Chloride, Serum: 109 mmol/L  Carbon Dioxide, Serum: 30 mmol/L  Blood Urea Nitrogen, Serum: 18 mg/dL  Creatinine, Serum: 0.55 mg/dL  Glucose, Serum: 93 mg/dL  Calcium, Total Serum: 8.6 mg/dL  eGFR: 90:    < from: US Duplex Carotid Arteries Complete, Bilateral (05.01.23 @ 17:34) >  Antegrade flow is noted within both vertebral arteries.    IMPRESSION:    Bilateral plaque. No significant hemodynamic stenosis of either internal   carotid artery.    < end of copied text >          < from: MR Head No Cont (05.01.23 @ 19:24) >  FINDINGS:  There is no evidence of acute infarct, acute intracranial hemorrhage,   mass effect or hydrocephalus. No extra-axial collection. Basal cisterns   are patent.    Age-related involutional changes and chronic microvascular ischemic   changes.    Ventricles and sulci are age-appropriate in size.    Major intracranial flow-voids are preserved.    Left cataract surgery. The orbits, sellar and suprasellar structures, and   craniocervical junction are otherwise unremarkable. Visualized paranasal   sinuses and mastoid air cells are clear.    IMPRESSION:  No acute infarct, acute intracranial hemorrhage, or mass effect.    < end of copied text >    < from: EEG w/ Video Each 12-26 Hours, Unmonitored (05.03.23 @ 09:23) >  INTERPRETATION:  16-channel video EEG recording performed on this   83-year-old woman with a possible seizure. The recording was started May   1, 2023 at 8:32 AM and terminated May 3, 2023 at 9:23 AM.    The background activities consist of bilateral symmetrical occipitally   dominant low amplitude 8-9 Hz activities which attenuate with eye   opening. Bifrontal, symmetrical low amplitude 15 to hertz activity noted.    All stages of sleep were recorded, transition to a wakeful state was   without any abnormalities.    No focal slowing or epileptiform activities noted.    Reviewing the automatic spike and seizure detection subtle results, all   events recorded deemed to be electrical, movement or muscle artifacts,   physiologic variance.  There were no patient derived push events recorded.    The EKG portion of record demonstrated a regular rhythm.    IMPRESSION: Normal 24 hour video EEG from with the patient awake, drowsy   and asleep.    < end of copied text >  
Patient seen and examined  resting in bed  for video EEG  vitals stable  MRI negative, carotid doppler negative. U/A negative    Review of Systems:  General:denies fever chills, headache, weakness  HEENT: denies blurry vision,diffculty swallowing, difficulty hearing, tinnitus  Cardiovascular: denies chest pain  ,palpitations  Pulmonary:denies shortness of breath, cough, wheezing, hemoptysis  Gastrointestinal: denies abdominal pain, constipation, diarrhea,nausea , vomiting, hematochezia  : denies hematuria, dysuria, or incontinence  Neurological: denies weakness, numbness , tingling, dizziness, tremors  MSK: denies muscle pain, difficulty ambulating, swelling, back pain  skin: denies skin rash, itching, burning, or  skin lesions  Psychiatrical: denies mood disturbances, anxierty, feeling depressed, depression , or difficulty sleeping    Objective:  Vitals  T(C): 36.7 (05-02-23 @ 08:43), Max: 36.9 (05-01-23 @ 23:33)  HR: 69 (05-02-23 @ 08:43) (67 - 75)  BP: 139/56 (05-02-23 @ 08:43) (125/65 - 139/56)  RR: 18 (05-02-23 @ 08:43) (13 - 20)  SpO2: 97% (05-02-23 @ 08:43) (97% - 100%)    Physical Exam:  General: comfortable, no acute distress  HEENT: Atraumatic, no LAD, trachea midline, PERRLA  Cardiovascular: normal s1s2, no murmurs, gallops or fricition rubs  Pulmonary: clear to ausculation Bilaterally, no wheezing , rhonchi  Gastrointestinal: soft non tender non distended, no masses felt, no organomegally  Muscloskeletal: no lower extremity edema, intact bilateral lower extremity pulses  Neurological: CN II-12 intact. No focal weakness  Psychiatrical: normal mood, cooperative  SKIN: no rash, lesions or ulcers    Labs:                          14.3   7.10  )-----------( 243      ( 01 May 2023 16:00 )             42.6     05-02    139  |  109<H>  |  18  ----------------------------<  93  3.5   |  30  |  0.55    Ca    8.6      02 May 2023 07:35    TPro  7.1  /  Alb  3.5  /  TBili  0.3  /  DBili  x   /  AST  21  /  ALT  30  /  AlkPhos  80  05-01    LIVER FUNCTIONS - ( 01 May 2023 16:00 )  Alb: 3.5 g/dL / Pro: 7.1 gm/dL / ALK PHOS: 80 U/L / ALT: 30 U/L / AST: 21 U/L / GGT: x           PT/INR - ( 01 May 2023 16:00 )   PT: 11.4 sec;   INR: 0.98 ratio         PTT - ( 01 May 2023 16:00 )  PTT:26.0 sec      Active Medications  MEDICATIONS  (STANDING):  amLODIPine   Tablet 10 milliGRAM(s) Oral daily  atorvastatin 20 milliGRAM(s) Oral at bedtime  enoxaparin Injectable 30 milliGRAM(s) SubCutaneous every 24 hours  sodium chloride 0.9% lock flush 3 milliLiter(s) IV Push four times a day  venlafaxine XR. 150 milliGRAM(s) Oral daily    MEDICATIONS  (PRN):  LORazepam   Injectable 2 milliGRAM(s) IV Push once PRN Seizure Activity

## 2023-05-03 NOTE — PROGRESS NOTE ADULT - NUTRITIONAL ASSESSMENT
This patient has been assessed with a concern for Malnutrition and has been determined to have a diagnosis/diagnoses of Severe protein-calorie malnutrition.    This patient is being managed with:   Diet DASH/TLC-  Sodium & Cholesterol Restricted  Entered: May  1 2023  6:38PM  
This patient has been assessed with a concern for Malnutrition and has been determined to have a diagnosis/diagnoses of Severe protein-calorie malnutrition.    This patient is being managed with:   Diet DASH/TLC-  Sodium & Cholesterol Restricted  Entered: May  1 2023  6:38PM

## 2023-05-03 NOTE — PROGRESS NOTE ADULT - ASSESSMENT
82 y/o female with PMHx of HLD,  HTN, UTI, Anxiety/Depression on Effexor ,  recent admission for witnessed fall and head injury last month who  presented  to [place]  ED with her daughter, Nyasia for an unresponsive  at home for about one minute. Patient admitted to tele for syncope evaluation.    Syncope  Possible seizure, Convulsion?    CVA less likely  - CTs , MRI and UA noted  - Seizure precautions  - Neurochecks Q 4 hours  - lipid panel, HgA1C,   - for video EEG 24 hour  - appreciate Neurology consult  - Cards consult noted in computer: Deferred followup on consult for now    HTN Hx :c/w norvasc    Anxiety Depression: c/w Effexor    UTI Hx  U/a negative    - DVT proph : lovenox  -  Full Code,  MOLST completed with pt
83 year old woman s/p LOC, non focal exam. CT head/MR  showed no acute changes. carotid dopplers no sign stenosis. 24 hr video EEG is normal. No evidence for CVA, unlikely TIA. Doubt seizure, ? convulsive syncope.  Suggest:  consider cardiac eval  f/u telemetry, EKG monitoring
83 year old woman s/p LOC, non focal exam. CT head/MR  showed no acute changes. carotid dopplers no sign stenosis. ? seizure, ? convulsive syncope.  Suggest:  video EEG for 24 hrs  
 84 y/o female with PMHx of HLD,  HTN, UTI, Anxiety/Depression on Effexor ,  recent admission for witnessed fall and head injury last month who  presented  to [place]  ED with her daughter, Nyasia for an unresponsive  at home for about one minute. Patient admitted to tele for syncope evaluation.    Syncope  Seizure less likely  CVA ruled out  - CTs , MRI and UA noted  - Seizure precautions  - Neurochecks Q 4 hours  - lipid panel, HgA1C unremarkable  - for video EEG 24 hour  - appreciate Neurology consult  - Cards consult noted in computer: to evaluate and follow outpatient for cardiogenic syncope    HTN Hx :c/w norvasc    Anxiety Depression: c/w Effexor    UTI Hx  U/a negative    - DVT proph : lovenox  -  Full Code,  MOLST completed with pt    Disposition: Tentative discharge tomorrow if cleared by neuro/cards.

## 2023-05-04 ENCOUNTER — TRANSCRIPTION ENCOUNTER (OUTPATIENT)
Age: 83
End: 2023-05-04

## 2023-05-04 VITALS — TEMPERATURE: 98 F

## 2023-05-04 DIAGNOSIS — R41.82 ALTERED MENTAL STATUS, UNSPECIFIED: ICD-10-CM

## 2023-05-04 DIAGNOSIS — I10 ESSENTIAL (PRIMARY) HYPERTENSION: ICD-10-CM

## 2023-05-04 LAB
CULTURE RESULTS: SIGNIFICANT CHANGE UP
SPECIMEN SOURCE: SIGNIFICANT CHANGE UP

## 2023-05-04 PROCEDURE — 99239 HOSP IP/OBS DSCHRG MGMT >30: CPT

## 2023-05-04 PROCEDURE — 99222 1ST HOSP IP/OBS MODERATE 55: CPT

## 2023-05-04 RX ADMIN — Medication 150 MILLIGRAM(S): at 09:04

## 2023-05-04 RX ADMIN — SODIUM CHLORIDE 3 MILLILITER(S): 9 INJECTION INTRAMUSCULAR; INTRAVENOUS; SUBCUTANEOUS at 00:00

## 2023-05-04 RX ADMIN — ENOXAPARIN SODIUM 30 MILLIGRAM(S): 100 INJECTION SUBCUTANEOUS at 09:08

## 2023-05-04 RX ADMIN — SODIUM CHLORIDE 3 MILLILITER(S): 9 INJECTION INTRAMUSCULAR; INTRAVENOUS; SUBCUTANEOUS at 12:14

## 2023-05-04 RX ADMIN — SODIUM CHLORIDE 3 MILLILITER(S): 9 INJECTION INTRAMUSCULAR; INTRAVENOUS; SUBCUTANEOUS at 06:03

## 2023-05-04 RX ADMIN — AMLODIPINE BESYLATE 10 MILLIGRAM(S): 2.5 TABLET ORAL at 09:04

## 2023-05-04 NOTE — DISCHARGE NOTE PROVIDER - DETAILS OF MALNUTRITION DIAGNOSIS/DIAGNOSES
This patient has been assessed with a concern for Malnutrition and was treated during this hospitalization for the following Nutrition diagnosis/diagnoses:     -  05/02/2023: Severe protein-calorie malnutrition

## 2023-05-04 NOTE — DISCHARGE NOTE PROVIDER - NSDCACTIVITY_GEN_ALL_CORE
Ambulate/activity with assistance/assistive device(s)/Do not drive or operate machinery/Do not make important decisions

## 2023-05-04 NOTE — DISCHARGE NOTE PROVIDER - HOSPITAL COURSE
FROM H&P:    "Pt is a pleasant  84 y/o female with PMHx of HLD,  HTN, UTI, Anxiety/Depression on Effexor ,  recent admission for witnessed fall and head injury last month who  presented  to [place]  ED with her daughter, Nyasia.   Pt's  daughter reported that  around 1:30 pm in the afternoon,  pt was sitting on the couch when she made a "weird" sound and turned to her side. Her right arm became rigid and pt was unresponsive for about one minute.    Once pt  became alert , she vomited once and c/o feeling fatigued and weak.  Pt's  speech  was normal,  and she did not recall the episode.   No tongue biting or incontinence.   Pt did have breakfast and has been in her usual health.     PCP  advised pt to go to the ED to evaluate for possible stroke.  In the ED , pt was BFAST positive and had Neuro alert activated at triage.  Pt denies chest pain/palpitations, no SOB/abd pain,  no n/v/d,  no  URI or UTI symptoms,  no fever/chills.  Pt reports an occas dry cough and generalized arthritis. "      Syncope  Seizure less likely  CVA ruled out  - CTs , MRI and UA uremarkable  - Seizure precautions  - Neurochecks Q 4 hours  - lipid panel, HgA1C unremarkable  - video EEG 24 hour negative  - appreciate Neurology consult  - Cards consult-> no event. TTE grossly unremarkable except for mild MS. No events on tele. Outpatient follow up.     HTN Hx   sbp running in low teens. Discussed with daughter. Will hold norvasc at discharge. Take blood pressure 2-3 times a day. If blood pressure over 140/90, resume norvasc 10mg tabe but with half tab daily first (5mg dose) and continue to monitor and if persistently over 140/90 there after resume full dose 10mg norvasc dose. Daughter verbalized understanding and is in agreement.   Obtained orthostatics 5/4. slightly positive orthostatics records. Attending took orthostatics with smaller cuff (as patient's arms are small) and orthostatics subsequently negative.     Anxiety Depression: c/w Effexor    UTI Hx  U/a negative    Clinically stable. Work up unremarkable. Discharge home in stable condition and close outpatient follow up.  T(C): 36.8 (05-04-23 @ 11:46), Max: 37.2 (05-03-23 @ 23:51)  HR: 70 (05-04-23 @ 08:23) (68 - 74)  BP: 114/61 (05-04-23 @ 08:23) (114/61 - 118/59)  RR: 18 (05-04-23 @ 08:23) (16 - 18)  SpO2: 95% (05-04-23 @ 08:23) (95% - 96%)   AAOx2; NAD  No JVD  RRR; No murmur  Lungs CTA bilaterally; Normal respiratory effort  Discharge Management: 36 minutes  Date of Discharge/Service: 5/4/2023

## 2023-05-04 NOTE — DISCHARGE NOTE PROVIDER - CARE PROVIDERS DIRECT ADDRESSES
[email or web address]

## 2023-05-04 NOTE — CONSULT NOTE ADULT - PROBLEM SELECTOR RECOMMENDATION 9
Altered mentation / loss of consciousness.  TTE and telemetry reviewed -- no significant abnormality to explain unusual presentation of syncope.  Check orthostatic vital signs.

## 2023-05-04 NOTE — DISCHARGE NOTE PROVIDER - NSDCCPCAREPLAN_GEN_ALL_CORE_FT
PRINCIPAL DISCHARGE DIAGNOSIS  Diagnosis: Syncope  Assessment and Plan of Treatment: Medical term for losing consciousness. Neurological and cardiac work up in the hospital unremarkable with MRI head negative for any acute process, echoccardiogram grossly within normal limits, labs also within normal limits. Mild drop in blood pressure with change in positive. Caution with activity with abrupt change in position and execute change in position slowly and carefully. Compression stockings may help as well. Close outpatient follow up with primary medical doctor.      SECONDARY DISCHARGE DIAGNOSES  Diagnosis: Hypertension  Assessment and Plan of Treatment: You blood pressure while taking norvasc/amlodapine at your regular home dose (10mg) was normal but on the low normal side. May not require as much medication for blood pressure control as you did in the past. Can hold blood pressure medication (norvasc/amlodapine) at discharge. Take blood pressure 2-3 times a day (use smaller cuff because of smaller arms for more accurate reading). If blood pressure over 140/90, can restart home blood pressure medication (norvasc/amlodapine) at half a tab (5mg) daily. If blood pressure after that persistently above 140/90 after restarting at half the dose, then can return to 10mg daily dosing.  Maintain low sodium diet  Keep log of blood pressure readings and dose of blood pressure you are taking (if applicable) to be reviewed with your primary medical provider.

## 2023-05-04 NOTE — DISCHARGE NOTE PROVIDER - CARE PROVIDER_API CALL
Ivon Larkin (DO)  Family Medicine  [address] [place] [address], [address]  [place] [address]  Phone: [phone]  Fax: [phone]  Established Patient  Follow Up Time: 1 week

## 2023-05-04 NOTE — CONSULT NOTE ADULT - SUBJECTIVE AND OBJECTIVE BOX
CHIEF COMPLAINT: Patient is a 83y old  Female who presents with a chief complaint of syncope, seizure? (03 May 2023 15:12)    HPI:  84 y/o woman with a history of HLD, HTN, UTI, Anxiety/Depression, hospitalization in 3/2023 for trip/fall resulting in scalp laceration who presented to the ER on 5/1 for the evaluation of an altered mentation -- triage complaint was sudden onset of AMS x 1 hr ago with rigid posture and weakness on left side.  Mrs. Thakur says she was seated in a chair when event happened but she has little recollection of events prior to admission  She reportedly vomiting upon regaining consciousness.  She reports no history of heart disease and currently feels well.  She has not been experiencing palpitations or chest discomfort. No clear exacerbating or alleviating factors.    PAST MEDICAL & SURGICAL HISTORY:  UTI (urinary tract infection)  HTN (hypertension)  HLD (hyperlipidemia)  H/O total knee replacement, bilateral  S/P lumpectomy, right breast    SOCIAL HISTORY:   Alcohol: Denied  Smoking: Nonsmoker  Lives with son in house    FAMILY HISTORY:  hypertension (Sibling)    MEDICATIONS  (STANDING):  amLODIPine   Tablet 10 milliGRAM(s) Oral daily  atorvastatin 20 milliGRAM(s) Oral at bedtime  enoxaparin Injectable 30 milliGRAM(s) SubCutaneous every 24 hours  sodium chloride 0.9% lock flush 3 milliLiter(s) IV Push four times a day  venlafaxine XR. 150 milliGRAM(s) Oral daily    MEDICATIONS  (PRN):  LORazepam   Injectable 2 milliGRAM(s) IV Push once PRN Seizure Activity    Allergies:  No Known Allergies    REVIEW OF SYSTEMS:  CONSTITUTIONAL: No fever or chill  Eyes: No visual changes  NECK: No pain or stiffness  RESPIRATORY: No shortness of breath  CARDIOVASCULAR: No chest pain or palpitations  GASTROINTESTINAL: No abdominal pain.   GENITOURINARY: No dysuria, frequency or hematuria  NEUROLOGICAL: No numbness.  SKIN: No itching or rash  All other review of systems is negative unless indicated above    VITAL SIGNS:   Vital Signs Last 24 Hrs  T(C): 36.7 (04 May 2023 08:23), Max: 37.2 (03 May 2023 23:51)  T(F): 98.1 (04 May 2023 08:23), Max: 99 (03 May 2023 23:51)  HR: 70 (04 May 2023 08:23) (68 - 74)  BP: 114/61 (04 May 2023 08:23) (114/61 - 118/59)  BP(mean): 69 (03 May 2023 09:15) (69 - 69)  RR: 18 (04 May 2023 08:23) (16 - 18)  SpO2: 95% (04 May 2023 08:23) (95% - 96%)  Patient On (Oxygen Delivery Method): room air    PHYSICAL EXAM:  Constitutional: NAD, awake and alert  HEENT:  No oral cyanosis.  Pulmonary: Non-labored, breath sounds are clear bilaterally, No wheezing, rales or rhonchi  Cardiovascular: S1 and S2, regular rate and rhythm, no Murmur  Gastrointestinal: Bowel Sounds present, soft, nontender.   Lymph: No edema. No cervical lymphadenopathy.  Neurological: Alert, no focal deficits  Skin: No rashes.  Psych:  Mood & affect appropriate    LABS:                14.3   7.10  )-----------( 243      ( 01 May 2023 16:00 )             42.6     139    |  109    |  18     ----------------------------<  93     3.5     |  30     |  0.55     Ca    8.6        02 May 2023 07:35    TPro  7.1    /  Alb  3.5    /  TBili  0.3    /  DBili  x      /  AST  21     /  ALT  30     /  AlkPhos  80     01 May 2023 16:00    Tele: SR    12 Lead ECG (05.01.23 @ 15:40):   *** Poor data quality, interpretation may be adversely affected  Sinus rhythm with 1st degree A-V block  Right bundle branch block  Abnormal ECG  When compared with ECG of 15-MAR-2023 18:53, No significant change was found    TTE Echo Complete w/o Contrast w/ Doppler (05.03.23 @ 14:41):   Mild concentric left ventricular hypertrophy is present.   The left ventricle is normal in size and wall motion. Left ventricular wall motion is normal. Normal left ventricular systolic function. Estimated left ventricular ejection fraction is 60-65 %.   Normal appearing right ventricle structure and function.   Mild aortic sclerosis is present with normal valvular opening. LVOT velocity is elevated without significant obstruction.   Moderate mitral annular calcification is present. There is calcification of mitral valve leaflets. The leaflet opening is mildly restricted. Mean transmitral gradient is 4 mmHg; this finding is consistent with mild mitral stenosis.   EA reversal of the mitral inflow consistent with reduced compliance of the left ventricle.   Trace mitral regurgitation is present.   The tricuspid valve leaflets are thin and pliable; valve motion is normal. Trace tricuspid valve regurgitation is present.   Normal appearing pulmonic valve structure. Trace pulmonic valvular regurgitation is present.   No evidence of pericardial effusion.

## 2023-05-04 NOTE — DISCHARGE NOTE PROVIDER - NSDCMRMEDTOKEN_GEN_ALL_CORE_FT
amLODIPine 10 mg oral tablet: 1 tab(s) orally once a day can hold this medication as discharge. Follow blood pressure at home 2-3 times a day and any blood pressure readings over 140/90, can restart this medication at half tab a day and increase back to a full tab daily if her blood pressure persistently over 140/90 thereafter  atorvastatin 20 mg oral tablet: 1 tab(s) orally once a day (at bedtime)  venlafaxine 150 mg oral tablet, extended release: 1 tab(s) orally once a day

## 2023-05-08 DIAGNOSIS — Z87.440 PERSONAL HISTORY OF URINARY (TRACT) INFECTIONS: ICD-10-CM

## 2023-05-08 DIAGNOSIS — E43 UNSPECIFIED SEVERE PROTEIN-CALORIE MALNUTRITION: ICD-10-CM

## 2023-05-08 DIAGNOSIS — R55 SYNCOPE AND COLLAPSE: ICD-10-CM

## 2023-05-08 DIAGNOSIS — I10 ESSENTIAL (PRIMARY) HYPERTENSION: ICD-10-CM

## 2023-05-08 DIAGNOSIS — F41.8 OTHER SPECIFIED ANXIETY DISORDERS: ICD-10-CM

## 2023-05-08 DIAGNOSIS — Z66 DO NOT RESUSCITATE: ICD-10-CM

## 2023-05-08 DIAGNOSIS — E78.5 HYPERLIPIDEMIA, UNSPECIFIED: ICD-10-CM

## 2023-05-08 DIAGNOSIS — Z96.653 PRESENCE OF ARTIFICIAL KNEE JOINT, BILATERAL: ICD-10-CM

## 2023-07-26 NOTE — ED ADULT NURSE NOTE - CHIEF COMPLAINT QUOTE
16 pt presents to ED from home s/p trip and fall backward down 2 steps. laceration to head. - blood thinners. -LOC. GCS 15. c/o pelvic pain and states she feels like she may have a UTI. NA 1721 MD Lazaro. transported to CT scan. no abdominal pain, no bloating, no constipation, no diarrhea, no nausea and no vomiting.

## 2024-04-01 ENCOUNTER — INPATIENT (INPATIENT)
Facility: HOSPITAL | Age: 84
LOS: 6 days | Discharge: NURSING, REHAB OR LONG-TERM CARE | DRG: 536 | End: 2024-04-08
Attending: HOSPITALIST | Admitting: INTERNAL MEDICINE
Payer: MEDICARE

## 2024-04-01 VITALS
OXYGEN SATURATION: 100 % | WEIGHT: 138.01 LBS | HEART RATE: 68 BPM | SYSTOLIC BLOOD PRESSURE: 126 MMHG | HEIGHT: 60 IN | RESPIRATION RATE: 17 BRPM | TEMPERATURE: 98 F | DIASTOLIC BLOOD PRESSURE: 76 MMHG

## 2024-04-01 DIAGNOSIS — Z98.890 OTHER SPECIFIED POSTPROCEDURAL STATES: Chronic | ICD-10-CM

## 2024-04-01 DIAGNOSIS — Z96.653 PRESENCE OF ARTIFICIAL KNEE JOINT, BILATERAL: Chronic | ICD-10-CM

## 2024-04-01 LAB
APTT BLD: 30.6 SEC — SIGNIFICANT CHANGE UP (ref 24.5–35.6)
BASOPHILS # BLD AUTO: 0.06 K/UL — SIGNIFICANT CHANGE UP (ref 0–0.2)
BASOPHILS NFR BLD AUTO: 0.6 % — SIGNIFICANT CHANGE UP (ref 0–2)
EOSINOPHIL # BLD AUTO: 0.24 K/UL — SIGNIFICANT CHANGE UP (ref 0–0.5)
EOSINOPHIL NFR BLD AUTO: 2.6 % — SIGNIFICANT CHANGE UP (ref 0–6)
HCT VFR BLD CALC: 41.7 % — SIGNIFICANT CHANGE UP (ref 34.5–45)
HGB BLD-MCNC: 13.7 G/DL — SIGNIFICANT CHANGE UP (ref 11.5–15.5)
IMM GRANULOCYTES NFR BLD AUTO: 0.3 % — SIGNIFICANT CHANGE UP (ref 0–0.9)
INR BLD: 0.88 RATIO — SIGNIFICANT CHANGE UP (ref 0.85–1.18)
LYMPHOCYTES # BLD AUTO: 1.61 K/UL — SIGNIFICANT CHANGE UP (ref 1–3.3)
LYMPHOCYTES # BLD AUTO: 17.4 % — SIGNIFICANT CHANGE UP (ref 13–44)
MCHC RBC-ENTMCNC: 30.5 PG — SIGNIFICANT CHANGE UP (ref 27–34)
MCHC RBC-ENTMCNC: 32.9 GM/DL — SIGNIFICANT CHANGE UP (ref 32–36)
MCV RBC AUTO: 92.9 FL — SIGNIFICANT CHANGE UP (ref 80–100)
MONOCYTES # BLD AUTO: 0.63 K/UL — SIGNIFICANT CHANGE UP (ref 0–0.9)
MONOCYTES NFR BLD AUTO: 6.8 % — SIGNIFICANT CHANGE UP (ref 2–14)
NEUTROPHILS # BLD AUTO: 6.7 K/UL — SIGNIFICANT CHANGE UP (ref 1.8–7.4)
NEUTROPHILS NFR BLD AUTO: 72.3 % — SIGNIFICANT CHANGE UP (ref 43–77)
PLATELET # BLD AUTO: 269 K/UL — SIGNIFICANT CHANGE UP (ref 150–400)
PROTHROM AB SERPL-ACNC: 10 SEC — SIGNIFICANT CHANGE UP (ref 9.5–13)
RBC # BLD: 4.49 M/UL — SIGNIFICANT CHANGE UP (ref 3.8–5.2)
RBC # FLD: 13.3 % — SIGNIFICANT CHANGE UP (ref 10.3–14.5)
WBC # BLD: 9.27 K/UL — SIGNIFICANT CHANGE UP (ref 3.8–10.5)
WBC # FLD AUTO: 9.27 K/UL — SIGNIFICANT CHANGE UP (ref 3.8–10.5)

## 2024-04-01 PROCEDURE — 99285 EMERGENCY DEPT VISIT HI MDM: CPT

## 2024-04-01 RX ORDER — ACETAMINOPHEN 500 MG
1000 TABLET ORAL ONCE
Refills: 0 | Status: COMPLETED | OUTPATIENT
Start: 2024-04-01 | End: 2024-04-01

## 2024-04-01 RX ADMIN — Medication 400 MILLIGRAM(S): at 23:41

## 2024-04-01 NOTE — ED ADULT NURSE NOTE - OBJECTIVE STATEMENT
84y female, A&Ox4, ambulatory with walker at baseline from home presents to ED s/p mechanical fall. Pt reports tripping and falling at home. -LOC, -Head strike, -Anticoagulation. Pt reporting pain in her right leg. +Sensation/motor function intact. No obvious trauma to the site. Respirations are even and unlabored, in NAD. 84y female, A&Ox3, ambulatory with walker at baseline from home presents to ED s/p mechanical fall. PMH dementia, HTN. Pt reports falling at home. -LOC, -Head strike, -Anticoagulation. Pt reporting pain in her right leg. +Sensation/motor function intact. No obvious trauma to the site. Respirations are even and unlabored, in NAD.

## 2024-04-01 NOTE — ED PROVIDER NOTE - OBJECTIVE STATEMENT
83 y/o F with PMHx of dementia, HTN, HLD, UTI, lumpectomy, TKR presents to the ED BIBEMS from home s/p fall.  fell and pt fell while trying to help him up., Family members found pt on floor. Reluctant to move pt's extremity due to pain. Pt brought to ED for evaluation.

## 2024-04-01 NOTE — ED PROVIDER NOTE - PHYSICAL EXAMINATION
Constitutional: NAD AAOx1  Eyes: PERRLA EOMI  Head: Normocephalic atraumatic  Mouth: MMM  Cardiac: regular rate   Resp: Lungs CTAB  GI: Abd s/nt/nd  Neuro: CN2-12 intact  Skin: No visible rashes   MSK: tender with palpation of R hip, R thigh, manipulation of R knee, 2+ distal pulses, normal movement of ankle b/l

## 2024-04-01 NOTE — ED PROVIDER NOTE - NS_ ATTENDINGSCRIBEDETAILS _ED_A_ED_FT
Miranda Stewart MD: The history, relevant review of systems, past medical and surgical history, medical decision making, and physical examination was documented by the scribe in my presence and I attest to the accuracy of the documentation.

## 2024-04-01 NOTE — ED PROVIDER NOTE - CLINICAL SUMMARY MEDICAL DECISION MAKING FREE TEXT BOX
83 y/o F no AC use with RLE pain. Will obtain labs, XR, pain control. 85 y/o F no AC use with RLE pain. Will obtain labs, XR, pain control. X-rays notable for right hip fracture.  Results discussed with patient and son at bedside.  Orthopedics consulted who came to evaluate patient.  Will admit for further care.  Signout given to hospitalist Dr. Olivera for admission.

## 2024-04-01 NOTE — ED ADULT TRIAGE NOTE - CHIEF COMPLAINT QUOTE
Pt BIBEMS from home c/o fall. A&Ox3. Pt states she was walking and "slipped on a piece of paper." -headstrike, -blood thinners, -LOC. Pt reports R leg pain. Pt able to move extremities, reports that it is too painful to lift R leg. No obvious deformities noted. Pt uses walker to ambulate at baseline. - allergies

## 2024-04-02 ENCOUNTER — RESULT REVIEW (OUTPATIENT)
Age: 84
End: 2024-04-02

## 2024-04-02 ENCOUNTER — TRANSCRIPTION ENCOUNTER (OUTPATIENT)
Age: 84
End: 2024-04-02

## 2024-04-02 DIAGNOSIS — S72.001A FRACTURE OF UNSPECIFIED PART OF NECK OF RIGHT FEMUR, INITIAL ENCOUNTER FOR CLOSED FRACTURE: ICD-10-CM

## 2024-04-02 PROBLEM — E78.5 HYPERLIPIDEMIA, UNSPECIFIED: Chronic | Status: ACTIVE | Noted: 2023-05-01

## 2024-04-02 PROBLEM — I10 ESSENTIAL (PRIMARY) HYPERTENSION: Chronic | Status: ACTIVE | Noted: 2023-05-01

## 2024-04-02 LAB
24R-OH-CALCIDIOL SERPL-MCNC: 21.5 NG/ML — LOW (ref 30–80)
ALBUMIN SERPL ELPH-MCNC: 3.3 G/DL — SIGNIFICANT CHANGE UP (ref 3.3–5)
ALBUMIN SERPL ELPH-MCNC: 3.4 G/DL — SIGNIFICANT CHANGE UP (ref 3.3–5)
ALP SERPL-CCNC: 68 U/L — SIGNIFICANT CHANGE UP (ref 40–120)
ALT FLD-CCNC: 43 U/L — SIGNIFICANT CHANGE UP (ref 12–78)
ANION GAP SERPL CALC-SCNC: 3 MMOL/L — LOW (ref 5–17)
ANION GAP SERPL CALC-SCNC: 3 MMOL/L — LOW (ref 5–17)
ANION GAP SERPL CALC-SCNC: 7 MMOL/L — SIGNIFICANT CHANGE UP (ref 5–17)
APTT BLD: 30 SEC — SIGNIFICANT CHANGE UP (ref 24.5–35.6)
AST SERPL-CCNC: 42 U/L — HIGH (ref 15–37)
BASOPHILS # BLD AUTO: 0.06 K/UL — SIGNIFICANT CHANGE UP (ref 0–0.2)
BASOPHILS NFR BLD AUTO: 0.8 % — SIGNIFICANT CHANGE UP (ref 0–2)
BILIRUB SERPL-MCNC: 0.5 MG/DL — SIGNIFICANT CHANGE UP (ref 0.2–1.2)
BUN SERPL-MCNC: 15 MG/DL — SIGNIFICANT CHANGE UP (ref 7–23)
BUN SERPL-MCNC: 20 MG/DL — SIGNIFICANT CHANGE UP (ref 7–23)
BUN SERPL-MCNC: 26 MG/DL — HIGH (ref 7–23)
CALCIUM SERPL-MCNC: 8.6 MG/DL — SIGNIFICANT CHANGE UP (ref 8.5–10.1)
CALCIUM SERPL-MCNC: 8.8 MG/DL — SIGNIFICANT CHANGE UP (ref 8.5–10.1)
CALCIUM SERPL-MCNC: 9.2 MG/DL — SIGNIFICANT CHANGE UP (ref 8.5–10.1)
CHLORIDE SERPL-SCNC: 109 MMOL/L — HIGH (ref 96–108)
CHLORIDE SERPL-SCNC: 110 MMOL/L — HIGH (ref 96–108)
CHLORIDE SERPL-SCNC: 110 MMOL/L — HIGH (ref 96–108)
CO2 SERPL-SCNC: 24 MMOL/L — SIGNIFICANT CHANGE UP (ref 22–31)
CO2 SERPL-SCNC: 29 MMOL/L — SIGNIFICANT CHANGE UP (ref 22–31)
CO2 SERPL-SCNC: 30 MMOL/L — SIGNIFICANT CHANGE UP (ref 22–31)
CREAT SERPL-MCNC: 0.69 MG/DL — SIGNIFICANT CHANGE UP (ref 0.5–1.3)
CREAT SERPL-MCNC: 0.7 MG/DL — SIGNIFICANT CHANGE UP (ref 0.5–1.3)
CREAT SERPL-MCNC: 0.82 MG/DL — SIGNIFICANT CHANGE UP (ref 0.5–1.3)
EGFR: 70 ML/MIN/1.73M2 — SIGNIFICANT CHANGE UP
EGFR: 84 ML/MIN/1.73M2 — SIGNIFICANT CHANGE UP
EGFR: 84 ML/MIN/1.73M2 — SIGNIFICANT CHANGE UP
EOSINOPHIL # BLD AUTO: 0.18 K/UL — SIGNIFICANT CHANGE UP (ref 0–0.5)
EOSINOPHIL NFR BLD AUTO: 2.4 % — SIGNIFICANT CHANGE UP (ref 0–6)
GLUCOSE BLDC GLUCOMTR-MCNC: 116 MG/DL — HIGH (ref 70–99)
GLUCOSE BLDC GLUCOMTR-MCNC: 159 MG/DL — HIGH (ref 70–99)
GLUCOSE SERPL-MCNC: 108 MG/DL — HIGH (ref 70–99)
GLUCOSE SERPL-MCNC: 120 MG/DL — HIGH (ref 70–99)
GLUCOSE SERPL-MCNC: 154 MG/DL — HIGH (ref 70–99)
HCT VFR BLD CALC: 41.2 % — SIGNIFICANT CHANGE UP (ref 34.5–45)
HCT VFR BLD CALC: 43.4 % — SIGNIFICANT CHANGE UP (ref 34.5–45)
HGB BLD-MCNC: 13.6 G/DL — SIGNIFICANT CHANGE UP (ref 11.5–15.5)
HGB BLD-MCNC: 14.1 G/DL — SIGNIFICANT CHANGE UP (ref 11.5–15.5)
IMM GRANULOCYTES NFR BLD AUTO: 0.4 % — SIGNIFICANT CHANGE UP (ref 0–0.9)
INR BLD: 0.88 RATIO — SIGNIFICANT CHANGE UP (ref 0.85–1.18)
LYMPHOCYTES # BLD AUTO: 1.45 K/UL — SIGNIFICANT CHANGE UP (ref 1–3.3)
LYMPHOCYTES # BLD AUTO: 19.1 % — SIGNIFICANT CHANGE UP (ref 13–44)
MCHC RBC-ENTMCNC: 30 PG — SIGNIFICANT CHANGE UP (ref 27–34)
MCHC RBC-ENTMCNC: 30.3 PG — SIGNIFICANT CHANGE UP (ref 27–34)
MCHC RBC-ENTMCNC: 32.5 GM/DL — SIGNIFICANT CHANGE UP (ref 32–36)
MCHC RBC-ENTMCNC: 33 GM/DL — SIGNIFICANT CHANGE UP (ref 32–36)
MCV RBC AUTO: 91.8 FL — SIGNIFICANT CHANGE UP (ref 80–100)
MCV RBC AUTO: 92.3 FL — SIGNIFICANT CHANGE UP (ref 80–100)
MONOCYTES # BLD AUTO: 0.57 K/UL — SIGNIFICANT CHANGE UP (ref 0–0.9)
MONOCYTES NFR BLD AUTO: 7.5 % — SIGNIFICANT CHANGE UP (ref 2–14)
NEUTROPHILS # BLD AUTO: 5.3 K/UL — SIGNIFICANT CHANGE UP (ref 1.8–7.4)
NEUTROPHILS NFR BLD AUTO: 69.8 % — SIGNIFICANT CHANGE UP (ref 43–77)
PLATELET # BLD AUTO: 215 K/UL — SIGNIFICANT CHANGE UP (ref 150–400)
PLATELET # BLD AUTO: 227 K/UL — SIGNIFICANT CHANGE UP (ref 150–400)
POTASSIUM SERPL-MCNC: 3.8 MMOL/L — SIGNIFICANT CHANGE UP (ref 3.5–5.3)
POTASSIUM SERPL-MCNC: 4.3 MMOL/L — SIGNIFICANT CHANGE UP (ref 3.5–5.3)
POTASSIUM SERPL-MCNC: 4.6 MMOL/L — SIGNIFICANT CHANGE UP (ref 3.5–5.3)
POTASSIUM SERPL-SCNC: 3.8 MMOL/L — SIGNIFICANT CHANGE UP (ref 3.5–5.3)
POTASSIUM SERPL-SCNC: 4.3 MMOL/L — SIGNIFICANT CHANGE UP (ref 3.5–5.3)
POTASSIUM SERPL-SCNC: 4.6 MMOL/L — SIGNIFICANT CHANGE UP (ref 3.5–5.3)
PROT SERPL-MCNC: 6.9 GM/DL — SIGNIFICANT CHANGE UP (ref 6–8.3)
PROTHROM AB SERPL-ACNC: 10 SEC — SIGNIFICANT CHANGE UP (ref 9.5–13)
RBC # BLD: 4.49 M/UL — SIGNIFICANT CHANGE UP (ref 3.8–5.2)
RBC # BLD: 4.7 M/UL — SIGNIFICANT CHANGE UP (ref 3.8–5.2)
RBC # FLD: 12.8 % — SIGNIFICANT CHANGE UP (ref 10.3–14.5)
RBC # FLD: 12.9 % — SIGNIFICANT CHANGE UP (ref 10.3–14.5)
SODIUM SERPL-SCNC: 141 MMOL/L — SIGNIFICANT CHANGE UP (ref 135–145)
SODIUM SERPL-SCNC: 141 MMOL/L — SIGNIFICANT CHANGE UP (ref 135–145)
SODIUM SERPL-SCNC: 143 MMOL/L — SIGNIFICANT CHANGE UP (ref 135–145)
WBC # BLD: 11.7 K/UL — HIGH (ref 3.8–10.5)
WBC # BLD: 7.59 K/UL — SIGNIFICANT CHANGE UP (ref 3.8–10.5)
WBC # FLD AUTO: 11.7 K/UL — HIGH (ref 3.8–10.5)
WBC # FLD AUTO: 7.59 K/UL — SIGNIFICANT CHANGE UP (ref 3.8–10.5)

## 2024-04-02 PROCEDURE — 82962 GLUCOSE BLOOD TEST: CPT

## 2024-04-02 PROCEDURE — 70450 CT HEAD/BRAIN W/O DYE: CPT | Mod: MC

## 2024-04-02 PROCEDURE — 73562 X-RAY EXAM OF KNEE 3: CPT | Mod: 26,RT

## 2024-04-02 PROCEDURE — 85027 COMPLETE CBC AUTOMATED: CPT

## 2024-04-02 PROCEDURE — 88311 DECALCIFY TISSUE: CPT | Mod: 26

## 2024-04-02 PROCEDURE — 70498 CT ANGIOGRAPHY NECK: CPT | Mod: MC

## 2024-04-02 PROCEDURE — 82040 ASSAY OF SERUM ALBUMIN: CPT

## 2024-04-02 PROCEDURE — 85730 THROMBOPLASTIN TIME PARTIAL: CPT

## 2024-04-02 PROCEDURE — 73501 X-RAY EXAM HIP UNI 1 VIEW: CPT | Mod: 26,RT

## 2024-04-02 PROCEDURE — 88305 TISSUE EXAM BY PATHOLOGIST: CPT

## 2024-04-02 PROCEDURE — 73501 X-RAY EXAM HIP UNI 1 VIEW: CPT | Mod: RT

## 2024-04-02 PROCEDURE — 88305 TISSUE EXAM BY PATHOLOGIST: CPT | Mod: 26

## 2024-04-02 PROCEDURE — 81001 URINALYSIS AUTO W/SCOPE: CPT

## 2024-04-02 PROCEDURE — 73552 X-RAY EXAM OF FEMUR 2/>: CPT | Mod: 26,RT

## 2024-04-02 PROCEDURE — 71045 X-RAY EXAM CHEST 1 VIEW: CPT

## 2024-04-02 PROCEDURE — C1713: CPT

## 2024-04-02 PROCEDURE — 88311 DECALCIFY TISSUE: CPT

## 2024-04-02 PROCEDURE — 97162 PT EVAL MOD COMPLEX 30 MIN: CPT | Mod: GP

## 2024-04-02 PROCEDURE — 97166 OT EVAL MOD COMPLEX 45 MIN: CPT | Mod: GO

## 2024-04-02 PROCEDURE — 93010 ELECTROCARDIOGRAM REPORT: CPT

## 2024-04-02 PROCEDURE — 70496 CT ANGIOGRAPHY HEAD: CPT | Mod: MC

## 2024-04-02 PROCEDURE — 84100 ASSAY OF PHOSPHORUS: CPT

## 2024-04-02 PROCEDURE — 27236 TREAT THIGH FRACTURE: CPT | Mod: AS,RT

## 2024-04-02 PROCEDURE — 97530 THERAPEUTIC ACTIVITIES: CPT | Mod: GP

## 2024-04-02 PROCEDURE — 97116 GAIT TRAINING THERAPY: CPT | Mod: GP

## 2024-04-02 PROCEDURE — 82140 ASSAY OF AMMONIA: CPT

## 2024-04-02 PROCEDURE — 80048 BASIC METABOLIC PNL TOTAL CA: CPT

## 2024-04-02 PROCEDURE — 73502 X-RAY EXAM HIP UNI 2-3 VIEWS: CPT | Mod: 26,RT,59

## 2024-04-02 PROCEDURE — 83735 ASSAY OF MAGNESIUM: CPT

## 2024-04-02 PROCEDURE — 93005 ELECTROCARDIOGRAM TRACING: CPT

## 2024-04-02 PROCEDURE — 71045 X-RAY EXAM CHEST 1 VIEW: CPT | Mod: 26

## 2024-04-02 PROCEDURE — 0042T: CPT

## 2024-04-02 PROCEDURE — 97535 SELF CARE MNGMENT TRAINING: CPT | Mod: GO

## 2024-04-02 PROCEDURE — 36415 COLL VENOUS BLD VENIPUNCTURE: CPT

## 2024-04-02 PROCEDURE — 82306 VITAMIN D 25 HYDROXY: CPT

## 2024-04-02 PROCEDURE — 73501 X-RAY EXAM HIP UNI 1 VIEW: CPT | Mod: 26,59,RT,77

## 2024-04-02 PROCEDURE — 99223 1ST HOSP IP/OBS HIGH 75: CPT

## 2024-04-02 PROCEDURE — C1776: CPT

## 2024-04-02 PROCEDURE — 85025 COMPLETE CBC W/AUTO DIFF WBC: CPT

## 2024-04-02 PROCEDURE — 85610 PROTHROMBIN TIME: CPT

## 2024-04-02 RX ORDER — ONDANSETRON 8 MG/1
8 TABLET, FILM COATED ORAL EVERY 8 HOURS
Refills: 0 | Status: DISCONTINUED | OUTPATIENT
Start: 2024-04-02 | End: 2024-04-08

## 2024-04-02 RX ORDER — SENNA PLUS 8.6 MG/1
2 TABLET ORAL AT BEDTIME
Refills: 0 | Status: DISCONTINUED | OUTPATIENT
Start: 2024-04-02 | End: 2024-04-08

## 2024-04-02 RX ORDER — ENOXAPARIN SODIUM 100 MG/ML
40 INJECTION SUBCUTANEOUS EVERY 24 HOURS
Refills: 0 | Status: DISCONTINUED | OUTPATIENT
Start: 2024-04-02 | End: 2024-04-02

## 2024-04-02 RX ORDER — SODIUM CHLORIDE 9 MG/ML
1000 INJECTION, SOLUTION INTRAVENOUS
Refills: 0 | Status: DISCONTINUED | OUTPATIENT
Start: 2024-04-02 | End: 2024-04-04

## 2024-04-02 RX ORDER — POLYETHYLENE GLYCOL 3350 17 G/17G
17 POWDER, FOR SOLUTION ORAL AT BEDTIME
Refills: 0 | Status: DISCONTINUED | OUTPATIENT
Start: 2024-04-02 | End: 2024-04-08

## 2024-04-02 RX ORDER — ACETAMINOPHEN 500 MG
1000 TABLET ORAL EVERY 8 HOURS
Refills: 0 | Status: DISCONTINUED | OUTPATIENT
Start: 2024-04-02 | End: 2024-04-08

## 2024-04-02 RX ORDER — SODIUM CHLORIDE 9 MG/ML
1000 INJECTION INTRAMUSCULAR; INTRAVENOUS; SUBCUTANEOUS
Refills: 0 | Status: DISCONTINUED | OUTPATIENT
Start: 2024-04-02 | End: 2024-04-04

## 2024-04-02 RX ORDER — TRANEXAMIC ACID 100 MG/ML
1000 INJECTION, SOLUTION INTRAVENOUS ONCE
Refills: 0 | Status: DISCONTINUED | OUTPATIENT
Start: 2024-04-02 | End: 2024-04-08

## 2024-04-02 RX ORDER — ENOXAPARIN SODIUM 100 MG/ML
40 INJECTION SUBCUTANEOUS EVERY 24 HOURS
Refills: 0 | Status: DISCONTINUED | OUTPATIENT
Start: 2024-04-03 | End: 2024-04-08

## 2024-04-02 RX ORDER — ASCORBIC ACID 60 MG
500 TABLET,CHEWABLE ORAL
Refills: 0 | Status: DISCONTINUED | OUTPATIENT
Start: 2024-04-02 | End: 2024-04-08

## 2024-04-02 RX ORDER — CEFAZOLIN SODIUM 1 G
2000 VIAL (EA) INJECTION EVERY 8 HOURS
Refills: 0 | Status: DISCONTINUED | OUTPATIENT
Start: 2024-04-02 | End: 2024-04-02

## 2024-04-02 RX ORDER — ACETAMINOPHEN 500 MG
1000 TABLET ORAL ONCE
Refills: 0 | Status: COMPLETED | OUTPATIENT
Start: 2024-04-02 | End: 2024-04-02

## 2024-04-02 RX ORDER — OXYCODONE HYDROCHLORIDE 5 MG/1
5 TABLET ORAL EVERY 4 HOURS
Refills: 0 | Status: DISCONTINUED | OUTPATIENT
Start: 2024-04-02 | End: 2024-04-08

## 2024-04-02 RX ORDER — SODIUM CHLORIDE 9 MG/ML
1000 INJECTION, SOLUTION INTRAVENOUS
Refills: 0 | Status: DISCONTINUED | OUTPATIENT
Start: 2024-04-02 | End: 2024-04-02

## 2024-04-02 RX ORDER — HYDROMORPHONE HYDROCHLORIDE 2 MG/ML
0.5 INJECTION INTRAMUSCULAR; INTRAVENOUS; SUBCUTANEOUS EVERY 4 HOURS
Refills: 0 | Status: DISCONTINUED | OUTPATIENT
Start: 2024-04-02 | End: 2024-04-08

## 2024-04-02 RX ORDER — FOLIC ACID 0.8 MG
1 TABLET ORAL DAILY
Refills: 0 | Status: DISCONTINUED | OUTPATIENT
Start: 2024-04-02 | End: 2024-04-08

## 2024-04-02 RX ORDER — FENTANYL CITRATE 50 UG/ML
50 INJECTION INTRAVENOUS
Refills: 0 | Status: DISCONTINUED | OUTPATIENT
Start: 2024-04-02 | End: 2024-04-02

## 2024-04-02 RX ORDER — FERROUS SULFATE 325(65) MG
325 TABLET ORAL DAILY
Refills: 0 | Status: DISCONTINUED | OUTPATIENT
Start: 2024-04-02 | End: 2024-04-08

## 2024-04-02 RX ORDER — CEFAZOLIN SODIUM 1 G
2000 VIAL (EA) INJECTION EVERY 8 HOURS
Refills: 0 | Status: COMPLETED | OUTPATIENT
Start: 2024-04-02 | End: 2024-04-03

## 2024-04-02 RX ORDER — ONDANSETRON 8 MG/1
4 TABLET, FILM COATED ORAL ONCE
Refills: 0 | Status: COMPLETED | OUTPATIENT
Start: 2024-04-02 | End: 2024-04-02

## 2024-04-02 RX ORDER — PROCHLORPERAZINE MALEATE 5 MG
10 TABLET ORAL EVERY 8 HOURS
Refills: 0 | Status: DISCONTINUED | OUTPATIENT
Start: 2024-04-02 | End: 2024-04-08

## 2024-04-02 RX ORDER — ACETAMINOPHEN 500 MG
1000 TABLET ORAL EVERY 8 HOURS
Refills: 0 | Status: DISCONTINUED | OUTPATIENT
Start: 2024-04-02 | End: 2024-04-04

## 2024-04-02 RX ORDER — CELECOXIB 200 MG/1
200 CAPSULE ORAL EVERY 12 HOURS
Refills: 0 | Status: DISCONTINUED | OUTPATIENT
Start: 2024-04-02 | End: 2024-04-08

## 2024-04-02 RX ORDER — OXYCODONE HYDROCHLORIDE 5 MG/1
5 TABLET ORAL ONCE
Refills: 0 | Status: DISCONTINUED | OUTPATIENT
Start: 2024-04-02 | End: 2024-04-02

## 2024-04-02 RX ORDER — OXYCODONE HYDROCHLORIDE 5 MG/1
10 TABLET ORAL EVERY 4 HOURS
Refills: 0 | Status: DISCONTINUED | OUTPATIENT
Start: 2024-04-02 | End: 2024-04-08

## 2024-04-02 RX ORDER — TRAZODONE HCL 50 MG
25 TABLET ORAL AT BEDTIME
Refills: 0 | Status: DISCONTINUED | OUTPATIENT
Start: 2024-04-02 | End: 2024-04-08

## 2024-04-02 RX ORDER — MAGNESIUM HYDROXIDE 400 MG/1
30 TABLET, CHEWABLE ORAL DAILY
Refills: 0 | Status: DISCONTINUED | OUTPATIENT
Start: 2024-04-02 | End: 2024-04-08

## 2024-04-02 RX ORDER — VENLAFAXINE HCL 75 MG
150 CAPSULE, EXT RELEASE 24 HR ORAL DAILY
Refills: 0 | Status: DISCONTINUED | OUTPATIENT
Start: 2024-04-02 | End: 2024-04-06

## 2024-04-02 RX ORDER — AMLODIPINE BESYLATE 2.5 MG/1
10 TABLET ORAL DAILY
Refills: 0 | Status: DISCONTINUED | OUTPATIENT
Start: 2024-04-02 | End: 2024-04-08

## 2024-04-02 RX ORDER — OXYCODONE HYDROCHLORIDE 5 MG/1
2.5 TABLET ORAL EVERY 4 HOURS
Refills: 0 | Status: DISCONTINUED | OUTPATIENT
Start: 2024-04-02 | End: 2024-04-08

## 2024-04-02 RX ORDER — PANTOPRAZOLE SODIUM 20 MG/1
40 TABLET, DELAYED RELEASE ORAL DAILY
Refills: 0 | Status: DISCONTINUED | OUTPATIENT
Start: 2024-04-02 | End: 2024-04-08

## 2024-04-02 RX ADMIN — SODIUM CHLORIDE 100 MILLILITER(S): 9 INJECTION INTRAMUSCULAR; INTRAVENOUS; SUBCUTANEOUS at 03:11

## 2024-04-02 RX ADMIN — SODIUM CHLORIDE 125 MILLILITER(S): 9 INJECTION, SOLUTION INTRAVENOUS at 19:36

## 2024-04-02 RX ADMIN — AMLODIPINE BESYLATE 10 MILLIGRAM(S): 2.5 TABLET ORAL at 09:51

## 2024-04-02 RX ADMIN — Medication 1000 MILLIGRAM(S): at 07:14

## 2024-04-02 RX ADMIN — Medication 1000 MILLIGRAM(S): at 14:06

## 2024-04-02 RX ADMIN — Medication 150 MILLIGRAM(S): at 09:51

## 2024-04-02 RX ADMIN — ONDANSETRON 4 MILLIGRAM(S): 8 TABLET, FILM COATED ORAL at 18:20

## 2024-04-02 RX ADMIN — Medication 1000 MILLIGRAM(S): at 14:20

## 2024-04-02 RX ADMIN — Medication 400 MILLIGRAM(S): at 07:14

## 2024-04-02 RX ADMIN — SODIUM CHLORIDE 75 MILLILITER(S): 9 INJECTION, SOLUTION INTRAVENOUS at 21:10

## 2024-04-02 NOTE — DISCHARGE NOTE PROVIDER - CARE PROVIDER_API CALL
BRITTNEY LAN  [address] [place] [id number]  Phone: [phone]  Follow Up Time:    BRITTNEY LAN  [address] [place] [address] [place] [address]  Phone: [phone]  Follow Up Time:     Ivon Larkin  [address]  [place] [address]  Phone: [phone]  Fax: [phone]  Follow Up Time:

## 2024-04-02 NOTE — CONSULT NOTE ADULT - SUBJECTIVE AND OBJECTIVE BOX
84y Female presents to ED s/p [facility] fall c/o severe R hip pain and inability to ambulate.  Patient denies headstrike or LOC. Localizes pain to R hip/femur. Patient denies radiation of pain. Patient denies numbness/tingling/burning in the RLE. No other bone/joint complaints. Patient is a community ambulator at baseline with cane intermittently. No Anticoagulation    BL TKA done in [facility] >10 years ago    PAST MEDICAL & SURGICAL HISTORY:  UTI (urinary tract infection)      HTN (hypertension)      HLD (hyperlipidemia)      H/O total knee replacement, bilateral      S/P lumpectomy, right breast        MEDICATIONS  (STANDING):    MEDICATIONS  (PRN):    Allergies    No Known Allergies    Intolerances        T(C): 36.9 (04-01-24 @ 20:35), Max: 36.9 (04-01-24 @ 20:35)  HR: 68 (04-01-24 @ 20:35) (68 - 68)  BP: 126/76 (04-01-24 @ 20:35) (126/76 - 126/76)  RR: 17 (04-01-24 @ 20:35) (17 - 17)  SpO2: 100% (04-01-24 @ 20:35) (100% - 100%)  Wt(kg): --    PE   RLE:  Skin intact; No ecchymosis/soft tissue swelling  Compartments soft; + TTP about hip. No TTP to knee/leg/ankle/foot   ROM lmited 2/2 pain   Unable to SLR; + Log Roll/Heel Strike  Motor intact GS/TA/FHL/EHL  SILT L2-S1  DP/PT pulses +    Secondary Survey:   No TTP over bony prominences, SILT, palpable pulses, full/painless A/PROM, compartments soft. No TTP over spinous processes or paraspinal muscles at C/T/L spine. No palpable step off. No other injuries or complaints.    Imaging:  XR demonstrating R FN Fx, displaced    84y Female with R FN Fx, displaced  - Pain control  - NPO/IVF while NPO  - CBC/BMP/Coags/T+S/COVID  - EKG/CXR  - Medical clearance appreciated please document  - Plan for OR for R hip Hemiarthroplasty today afternoon  - Will discuss with attending and update plan accordingly 84y Female presents to ED s/p [facility] fall c/o severe R hip pain and inability to ambulate.  Patient denies headstrike or LOC. Localizes pain to R hip/femur. Patient denies radiation of pain. Patient denies numbness/tingling/burning in the RLE. No other bone/joint complaints. Patient is a community ambulator at baseline with cane intermittently. No Anticoagulation    BL TKA done in [facility] >10 years ago    PAST MEDICAL & SURGICAL HISTORY:  UTI (urinary tract infection)      HTN (hypertension)      HLD (hyperlipidemia)      H/O total knee replacement, bilateral      S/P lumpectomy, right breast        MEDICATIONS  (STANDING):    MEDICATIONS  (PRN):    Allergies    No Known Allergies    Intolerances        T(C): 36.9 (04-01-24 @ 20:35), Max: 36.9 (04-01-24 @ 20:35)  HR: 68 (04-01-24 @ 20:35) (68 - 68)  BP: 126/76 (04-01-24 @ 20:35) (126/76 - 126/76)  RR: 17 (04-01-24 @ 20:35) (17 - 17)  SpO2: 100% (04-01-24 @ 20:35) (100% - 100%)  Wt(kg): --    PE   RLE:  Skin intact; No ecchymosis/soft tissue swelling  Compartments soft; + TTP about hip. Mild TTP globally R Knee, No TTP to leg/ankle/foot   ROM lmited 2/2 pain   Unable to SLR; + Log Roll/Heel Strike  Motor intact GS/TA/FHL/EHL  SILT L2-S1  DP/PT pulses +    Secondary Survey:   No TTP over bony prominences, SILT, palpable pulses, full/painless A/PROM, compartments soft. No TTP over spinous processes or paraspinal muscles at C/T/L spine. No palpable step off. No other injuries or complaints.    Imaging:  XR demonstrating R FN Fx, displaced    84y Female with R FN Fx, displaced  - Pain control  - NPO/IVF while NPO  - CBC/BMP/Coags/T+S/COVID  - EKG/CXR  - Medical clearance appreciated please document  - Plan for OR for R hip Hemiarthroplasty today afternoon  - Will discuss with attending and update plan accordingly

## 2024-04-02 NOTE — DISCHARGE NOTE PROVIDER - NSDCCPCAREPLAN_GEN_ALL_CORE_FT
PRINCIPAL DISCHARGE DIAGNOSIS  Diagnosis: Hip fracture, right  Assessment and Plan of Treatment:      PRINCIPAL DISCHARGE DIAGNOSIS  Diagnosis: Hip fracture, right  Assessment and Plan of Treatment: f/u with Dr Simon as outpatient  PT as per rehab  pain meds as per your dischagre medication paperwork.        SECONDARY DISCHARGE DIAGNOSES  Diagnosis: Acute UTI  Assessment and Plan of Treatment: complete antibiotic regimen as prescribed. Complete the full course of antibiotics- do not skip or miss doses- do not stop even when you start to feel better.   Notify your PCP if your symptom has worsened or if you develop excessive diarrhea while on antibiotic.   you need 4 more day sor oral antibiotic.

## 2024-04-02 NOTE — DISCHARGE NOTE PROVIDER - ATTENDING DISCHARGE PHYSICAL EXAMINATION:
Patient seen and examined with JERMAINE Espinal.  I was physically present for the key portions of the evaluation and management (E/M) service provided.  I agree with the above history, physical, and plan which I have reviewed and edited where appropriate.       # metabolic encephalopathy - resolving  - code stroke on 4/6.  workup negative.  - feel encephalopathy is due to combination of venlafaxine effects in setting of acute UTI  - continue ceftriaxone and change to po ceftin for 4 more days upon dc     # Closed right femoral neck fracture s/p mechanical fall without LOC.  -s/p right hip hemiarthroplasty POD#4  -IVF- completed   -Adequate pain control  -Ortho consult reviewed and appreciated.  -PT ongoing     # HTN and HLD   -Stable and controlled.  -c/w antihypertensive regimen   -atorvastatin     # Anxiety and depression.  -On Venlafaxine. no longer taking this as per daughter.  will dc. pt had catatonia like reaction last time.    # DVT prophylaxis: lovenox     dc today to refinesse

## 2024-04-02 NOTE — H&P ADULT - ASSESSMENT
The patient is a 84y Female with significant PMH of dementia, HTN, HLD, UTI, Anxiety, depression, lumpectomy, B/L TKR about 10 years ago in [place] presented to the ED via EMS with c/o right hip pain s/p fall at home. reportedly, her  fell,  and patient fell while trying to help him up. Patient fell on her right hip, and after the fall, she was not able to get up or walk due to moderate to severe pain to her right hip. She denies hitting her head or passing out.  She says that her right hip hurts more when she moves her right leg. She is ambulatory with the help of a walker. She denies fever, chills, chest pain, sob, any prior heart attack, palpitation, headache, N/V, abdominal pain, diarrhea or dysuria.  Also denies smoking, alcohol or substance abuse.      # Closed right femoral neck fracture s/p mechanical fall without LOC.  -Admit to medical floor.  -NPO except meds, ice chips and sips of water.  -Maintenance IV fluids while NPO.  -Adequate pain control.  -NWB RLE.  -Plan for right hip hemiarthroplasty in OR in the afternoon.  -Patient may proceed to OR as planned with moderate dorita-operative and post-operative associated risks.  -Ortho consult reviewed and appreciated.  -Need PT/OT post operative.    # HTN and HPL.  -Stable and controlled.  -Continue her Norvasc.  -Resume her Atorvastatin post-op day 1 or 2.    # Anxiety and depression.  -On Venlafaxine.    # DVT prophylaxis: SCDs for now.

## 2024-04-02 NOTE — DISCHARGE NOTE PROVIDER - NSDCMRMEDTOKEN_GEN_ALL_CORE_FT
amLODIPine 10 mg oral tablet: 1 tab(s) orally once a day can hold this medication as discharge. Follow blood pressure at home 2-3 times a day and any blood pressure readings over 140/90, can restart this medication at half tab a day and increase back to a full tab daily if her blood pressure persistently over 140/90 thereafter  atorvastatin 20 mg oral tablet: 1 tab(s) orally once a day (at bedtime)  venlafaxine 150 mg oral tablet, extended release: 1 tab(s) orally once a day   acetaminophen 500 mg oral tablet: 2 tab(s) orally every 8 hours  amLODIPine 10 mg oral tablet: 1 tab(s) orally once a day  ascorbic acid 500 mg oral tablet: 1 tab(s) orally 2 times a day  atorvastatin 20 mg oral tablet: 1 tab(s) orally once a day (at bedtime)  Ceftin 500 mg oral tablet: 1 tab(s) orally 2 times a day x4 days  enoxaparin: 40 milligram(s) subcutaneous once a day x 35 days- last day on May8  ferrous sulfate 325 mg (65 mg elemental iron) oral tablet: 1 tab(s) orally once a day  folic acid 1 mg oral tablet: 1 tab(s) orally once a day  magnesium hydroxide 8% oral suspension: 30 milliliter(s) orally once a day As needed Constipation  metoprolol succinate 25 mg oral tablet, extended release: 1 tab(s) orally once a day  Multiple Vitamins oral tablet: 1 tab(s) orally once a day  pantoprazole 40 mg oral delayed release tablet: 1 tab(s) orally once a day  polyethylene glycol 3350 oral powder for reconstitution: 17 gram(s) orally once a day (at bedtime)  senna leaf extract oral tablet: 2 tab(s) orally once a day (at bedtime)

## 2024-04-02 NOTE — DISCHARGE NOTE PROVIDER - PROVIDER TOKENS
PROVIDER:[TOKEN:[[id number]]] PROVIDER:[TOKEN:[[id number]]],PROVIDER:[TOKEN:[[id number]]]

## 2024-04-02 NOTE — PROGRESS NOTE ADULT - ASSESSMENT
84 year old woman admitted with       # Closed right femoral neck fracture s/p mechanical fall without LOC.  -Admit to medical floor.  -NPO except meds, ice chips and sips of water.  -IVF  -Adequate pain control.  -NWB RLE.  -Plan for right hip hemiarthroplasty in OR in the afternoon.  -Patient may proceed to OR as planned with moderate dorita-operative and post-operative associated risks.  -Ortho consult reviewed and appreciated.  -Need PT/OT post operative.    # HTN and HLD   -Stable and controlled.  -Continue her Norvasc.  -Resume her Atorvastatin post-op day 1 or 2.    # Anxiety and depression.  -On Venlafaxine.    # DVT prophylaxis: SCDs for now.

## 2024-04-02 NOTE — DISCHARGE NOTE PROVIDER - NSDCCAREPROVSEEN_GEN_ALL_CORE_FT
Taiwo Simon Alfred, Taiwo Barnes, Claudio Olivera, Rosas Smith, Lukasz Goldberg, Dina Stewart, Claudio Sierra, David Chapman, Oscar Velez, Pacheco Loza, Cristian Walker, Ca Swartz, Luisito Palacio, Patrice Espinal, Aissatou

## 2024-04-02 NOTE — DISCHARGE NOTE PROVIDER - CARE PROVIDERS DIRECT ADDRESSES
,DirectAddress_Unknown ,DirectAddress_Unknown,[email or web address]

## 2024-04-02 NOTE — H&P ADULT - NSHPLABSRESULTS_GEN_ALL_CORE
LABS:                        13.7   9.27  )-----------( 269      ( 01 Apr 2024 23:25 )             41.7     04-01    143  |  110<H>  |  26<H>  ----------------------------<  120<H>  4.6   |  30  |  0.82    Ca    9.2      01 Apr 2024 23:25    T Pro  6.9  /  Alb  3.3  /  T Bili  0.5  /  D Bili  x   /  AST  42<H>  /  ALT  43  /  Alk Phos  68  04-01    PT/ INR - ( 01 Apr 2024 23:25 )   PT: 10.0 sec;   INR: 0.88 ratio         PTT - ( 01 Apr 2024 23:25 )  PTT:30.6 sec

## 2024-04-02 NOTE — DISCHARGE NOTE PROVIDER - HOSPITAL COURSE
Orthopedic Summary  H&P:  Pt is a 84y Female    PAST MEDICAL & SURGICAL HISTORY:  UTI (urinary tract infection)      HTN (hypertension)      HLD (hyperlipidemia)      Anxiety and depression      Dementia      Fracture of femoral neck, right      H/O total knee replacement, bilateral      S/P lumpectomy, right breast            Now s/p Right Hip Hemiarthroplasty for fracture. Pt is afebrile with stable vital signs. Pain is controlled. Exam reveals intact EHL FHL TA GS, +DP. Dressing is clean and dry.    Hospital Course:  Patient presented to [facility] ED after a fall, found to have a right hip fracture, and admitted to the Medical Service. Pt was medically cleared prior to surgery. Prophylactic antibiotics were started before the procedure and continued for 24 hours. They were admitted after surgery to the orthopedic floor.  There were no orthopedic complications during the hospital stay. All home medications were continued.    Routine consult was obtained from Physical Therapy and followed by both Medicine and Orthopedics for Co-management. Patient was placed on  anticoagulation.  Pertinent home medications were continued.  Daily labs were followed.      On POD 0 there were no major issues. Pt received PT daily and was Discharged once cleared per Medicine.  The orthopedic Attending is aware and agrees. See addendum to DC summary per medical team below for any additional info or if any changes. 84 year old woman with history of  dementia, HTN, HLD, UTI, Anxiety, depression, lumpectomy, B/L TKR about 10 years ago in [place] presented to the ED via EMS with c/o right hip pain s/p fall at home. reportedly, her  fell,  and patient fell while trying to help him up. Patient fell on her right hip, and after the fall, she was not able to get up or walk due to moderate to severe pain to her right hip.  Imaging showed  Right Femoral neck fracture- Ortho consulted.  Now s/p Right Hip Hemiarthroplasty for fracture. Pt is afebrile with stable vital signs. Pain is controlled. Exam reveals intact EHL FHL TA GS, +DP. Dressing is clean and dry.    Hospital Course:  Patient presented to  ED after a fall, found to have a right hip fracture, and admitted to the Medical Service. Pt was medically cleared prior to surgery. Prophylactic antibiotics were started before the procedure and continued for 24 hours. They were admitted after surgery to the orthopedic floor.  There were no orthopedic complications during the hospital stay. All home medications were continued.    Routine consult was obtained from Physical Therapy and followed by both Medicine and Orthopedics for Co-management. Patient was placed on  anticoagulation.  Pertinent home medications were continued.  Daily labs were followed.      On POD 0 there were no major issues. Pt received PT daily. The orthopedic Attending is aware and agrees.   Hospital course further complicated with UTI and patient was started on IV antibiotic     PLAN     # metabolic encephalopathy - resolving  - code stroke on 4/6.  workup negative.  - encephalopathy is due to combination of venlafaxine effects in setting of acute UTI  - continue ceftriaxone and change to po ceftin on dc     # Closed right femoral neck fracture s/p mechanical fall without LOC.  -s/p right hip hemiarthroplasty POD#6  -IVF- completed   -Adequate pain control  -Ortho consult reviewed and appreciated.  -PT ongoing     # HTN and HLD   -Stable and controlled.  -c/w antihypertensive regimen   -atorvastatin     # Anxiety and depression.  -On Venlafaxine. no longer taking this as per daughter.  will dc. pt had catatonia like reaction last time.    # DVT prophylaxis: lovenox

## 2024-04-02 NOTE — H&P ADULT - NSHPPHYSICALEXAM_GEN_ALL_CORE
PHYSICAL EXAM:  GENERAL: NAD, lying in bed comfortably  HEAD:  Atraumatic, Normocephalic  EYES: EOMI, PERRLA, conjunctiva and sclera clear  ENT: Moist mucous membranes  NECK: Supple, No JVD  CHEST/LUNG: Clear to auscultation bilaterally; No rales, rhonchi, wheezing, or rubs. Unlabored respirations  HEART: Regular rate and rhythm; No murmurs, rubs, or gallops  ABDOMEN: Bowel sounds present; Soft, Nontender, Nondistended. No hepatomegaly  EXTREMITIES:  2+ Peripheral Pulses, brisk capillary refill. No clubbing, cyanosis, or edema  NERVOUS SYSTEM:  Alert & Oriented X3, speech clear. No deficits   MSK: Right hip tender with ROM.  SKIN: No rashes or lesions

## 2024-04-02 NOTE — DISCHARGE NOTE PROVIDER - NSDCFUADDINST_GEN_ALL_CORE_FT
Discharge Instructions for Right Hip Hemiarthroplasty:    1. PAIN CONTROL: See Med Rec.  2. ACTIVITY: Weight Bearing as Tolerated with assistance and rolling walker. Posterior Hip Precautions. Abduction pillow while in bed or chair for 6 weeks.  3. PT: daily, Posterior Hip Precautions.  4. DVT/PE PROPHYLAXIS: Continue DVT/PE Prophylaxis. See Med Rec for Duration and dose.  5. BANDAGE: Change dressing to a new Mepilex Ag bandage POD7 (4/9/24). May change sooner if dressing saturated or falling off. DO NOT REMOVE BANDAGE TO CHECK WOUND ON INTAKE.  6. STAPLES: RN Remove Staples POD14 (4/16/24).  7. SHOWER: Okay to shower. Do not soak, submerge or let shower stream beat on dressing/wound.  8. FOLLOW UP: Follow-up with Dr. Simon in 1 month. Call office for appointment. Please perform portable x-rays of right hip and femur at Rehab POD 14 (4/16/24) before follow up.

## 2024-04-02 NOTE — PATIENT PROFILE ADULT - LEGAL HELP
Problem: Adult Inpatient Plan of Care  Goal: Plan of Care Review  8/1/2022 1457 by Camille Traylor RN  Outcome: Met  8/1/2022 1340 by Camille Traylor RN  Outcome: Ongoing, Progressing  Goal: Patient-Specific Goal (Individualized)  8/1/2022 1457 by Camille Traylor RN  Outcome: Met  8/1/2022 1340 by Camille Traylor RN  Outcome: Ongoing, Progressing  Goal: Absence of Hospital-Acquired Illness or Injury  8/1/2022 1457 by Camille Traylor RN  Outcome: Met  8/1/2022 1340 by Camille Traylor RN  Outcome: Ongoing, Progressing  Goal: Optimal Comfort and Wellbeing  8/1/2022 1457 by Camille Traylor RN  Outcome: Met  8/1/2022 1340 by Camille Traylor RN  Outcome: Ongoing, Progressing  Goal: Readiness for Transition of Care  8/1/2022 1457 by Camille Traylor RN  Outcome: Met  8/1/2022 1340 by Camille Traylor RN  Outcome: Ongoing, Progressing     Problem: Infection  Goal: Absence of Infection Signs and Symptoms  8/1/2022 1457 by Camille Traylor RN  Outcome: Met  8/1/2022 1340 by Camille Traylor RN  Outcome: Ongoing, Progressing     Problem: Skin Injury Risk Increased  Goal: Skin Health and Integrity  8/1/2022 1457 by Camilel Traylor RN  Outcome: Met  8/1/2022 1340 by Camille Traylor RN  Outcome: Ongoing, Progressing     Problem: Fall Injury Risk  Goal: Absence of Fall and Fall-Related Injury  8/1/2022 1457 by Camille Traylor RN  Outcome: Met  8/1/2022 1340 by Camille Traylor RN  Outcome: Ongoing, Progressing     Problem: Impaired Wound Healing  Goal: Optimal Wound Healing  8/1/2022 1457 by Camille Traylor RN  Outcome: Met  8/1/2022 1340 by Camille Traylor RN  Outcome: Ongoing, Progressing     Problem: Behavior Regulation Impairment (Dementia Signs/Symptoms)  Goal: Improved Behavioral Control (Dementia Signs/Symptoms)  8/1/2022 1457 by Camille Traylor RN  Outcome: Met  8/1/2022 1340 by Camille Traylor RN  Outcome: Ongoing, Progressing     Problem: Cognitive Impairment (Dementia Signs/Symptoms)  Goal: Optimized Cognitive Function (Dementia Signs/Symptoms)  8/1/2022 1457 by Maja Traylor, RN  Outcome: Met  8/1/2022  1340 by Camille Traylor RN  Outcome: Ongoing, Progressing     Problem: Mood Impairment (Dementia Signs/Symptoms)  Goal: Improved Mood Symptoms (Dementia Signs/Symptoms)  8/1/2022 1457 by Camille Traylor RN  Outcome: Met  8/1/2022 1340 by Camille Traylor RN  Outcome: Ongoing, Progressing     Problem: Nutrition Imbalance (Dementia Signs/Symptoms)  Goal: Optimized Nutrition Intake (Dementia Signs/Symptoms)  8/1/2022 1457 by Camille Traylor RN  Outcome: Met  8/1/2022 1340 by Camille Traylor RN  Outcome: Ongoing, Progressing     Problem: Sleep Disturbance (Dementia Signs/Symptoms)  Goal: Improved Sleep (Dementia Signs/Symptoms)  8/1/2022 1457 by Camille Traylor RN  Outcome: Met  8/1/2022 1340 by Camille Traylor RN  Outcome: Ongoing, Progressing     Problem: Social or Functional Impairment (Dementia Signs/Symptoms)  Goal: Enhanced Social or Functional Skills and Ability (Dementia Signs/Symptoms)  8/1/2022 1457 by Camille Traylor RN  Outcome: Met  8/1/2022 1340 by Camille Traylor RN  Outcome: Ongoing, Progressing     Problem: COPD (Chronic Obstructive Pulmonary Disease) Comorbidity  Goal: Maintenance of COPD Symptom Control  8/1/2022 1457 by Camille Traylor RN  Outcome: Met  8/1/2022 1340 by Camille Traylor RN  Outcome: Ongoing, Progressing     Problem: Heart Failure Comorbidity  Goal: Maintenance of Heart Failure Symptom Control  8/1/2022 1457 by Camille Traylor RN  Outcome: Met  8/1/2022 1340 by Camille Traylor RN  Outcome: Ongoing, Progressing     Problem: Hypertension Comorbidity  Goal: Blood Pressure in Desired Range  8/1/2022 1457 by Camille Traylor RN  Outcome: Met  8/1/2022 1340 by Camille Traylor RN  Outcome: Ongoing, Progressing      no

## 2024-04-03 LAB
ANION GAP SERPL CALC-SCNC: 7 MMOL/L — SIGNIFICANT CHANGE UP (ref 5–17)
BUN SERPL-MCNC: 16 MG/DL — SIGNIFICANT CHANGE UP (ref 7–23)
CALCIUM SERPL-MCNC: 8.6 MG/DL — SIGNIFICANT CHANGE UP (ref 8.5–10.1)
CHLORIDE SERPL-SCNC: 110 MMOL/L — HIGH (ref 96–108)
CO2 SERPL-SCNC: 24 MMOL/L — SIGNIFICANT CHANGE UP (ref 22–31)
CREAT SERPL-MCNC: 0.74 MG/DL — SIGNIFICANT CHANGE UP (ref 0.5–1.3)
EGFR: 79 ML/MIN/1.73M2 — SIGNIFICANT CHANGE UP
GLUCOSE SERPL-MCNC: 116 MG/DL — HIGH (ref 70–99)
HCT VFR BLD CALC: 35.2 % — SIGNIFICANT CHANGE UP (ref 34.5–45)
HGB BLD-MCNC: 11.7 G/DL — SIGNIFICANT CHANGE UP (ref 11.5–15.5)
MCHC RBC-ENTMCNC: 30.6 PG — SIGNIFICANT CHANGE UP (ref 27–34)
MCHC RBC-ENTMCNC: 33.2 GM/DL — SIGNIFICANT CHANGE UP (ref 32–36)
MCV RBC AUTO: 92.1 FL — SIGNIFICANT CHANGE UP (ref 80–100)
PLATELET # BLD AUTO: 208 K/UL — SIGNIFICANT CHANGE UP (ref 150–400)
POTASSIUM SERPL-MCNC: 3.9 MMOL/L — SIGNIFICANT CHANGE UP (ref 3.5–5.3)
POTASSIUM SERPL-SCNC: 3.9 MMOL/L — SIGNIFICANT CHANGE UP (ref 3.5–5.3)
RBC # BLD: 3.82 M/UL — SIGNIFICANT CHANGE UP (ref 3.8–5.2)
RBC # FLD: 13 % — SIGNIFICANT CHANGE UP (ref 10.3–14.5)
SODIUM SERPL-SCNC: 141 MMOL/L — SIGNIFICANT CHANGE UP (ref 135–145)
WBC # BLD: 10.77 K/UL — HIGH (ref 3.8–10.5)
WBC # FLD AUTO: 10.77 K/UL — HIGH (ref 3.8–10.5)

## 2024-04-03 PROCEDURE — 99232 SBSQ HOSP IP/OBS MODERATE 35: CPT

## 2024-04-03 RX ORDER — METOPROLOL TARTRATE 50 MG
25 TABLET ORAL DAILY
Refills: 0 | Status: DISCONTINUED | OUTPATIENT
Start: 2024-04-03 | End: 2024-04-08

## 2024-04-03 RX ORDER — SODIUM CHLORIDE 9 MG/ML
1000 INJECTION INTRAMUSCULAR; INTRAVENOUS; SUBCUTANEOUS
Refills: 0 | Status: DISCONTINUED | OUTPATIENT
Start: 2024-04-03 | End: 2024-04-04

## 2024-04-03 RX ORDER — AMLODIPINE BESYLATE 2.5 MG/1
1 TABLET ORAL
Qty: 0 | Refills: 0 | DISCHARGE

## 2024-04-03 RX ORDER — VENLAFAXINE HCL 75 MG
1 CAPSULE, EXT RELEASE 24 HR ORAL
Qty: 0 | Refills: 0 | DISCHARGE

## 2024-04-03 RX ORDER — ATORVASTATIN CALCIUM 80 MG/1
20 TABLET, FILM COATED ORAL AT BEDTIME
Refills: 0 | Status: DISCONTINUED | OUTPATIENT
Start: 2024-04-03 | End: 2024-04-08

## 2024-04-03 RX ADMIN — AMLODIPINE BESYLATE 10 MILLIGRAM(S): 2.5 TABLET ORAL at 10:14

## 2024-04-03 RX ADMIN — Medication 500 MILLIGRAM(S): at 21:09

## 2024-04-03 RX ADMIN — Medication 1000 MILLIGRAM(S): at 17:17

## 2024-04-03 RX ADMIN — CELECOXIB 200 MILLIGRAM(S): 200 CAPSULE ORAL at 10:15

## 2024-04-03 RX ADMIN — Medication 500 MILLIGRAM(S): at 10:14

## 2024-04-03 RX ADMIN — CELECOXIB 200 MILLIGRAM(S): 200 CAPSULE ORAL at 21:08

## 2024-04-03 RX ADMIN — Medication 325 MILLIGRAM(S): at 10:14

## 2024-04-03 RX ADMIN — Medication 1000 MILLIGRAM(S): at 10:15

## 2024-04-03 RX ADMIN — Medication 1 TABLET(S): at 10:15

## 2024-04-03 RX ADMIN — ATORVASTATIN CALCIUM 20 MILLIGRAM(S): 80 TABLET, FILM COATED ORAL at 21:09

## 2024-04-03 RX ADMIN — Medication 1 MILLIGRAM(S): at 10:15

## 2024-04-03 RX ADMIN — Medication 1000 MILLIGRAM(S): at 17:02

## 2024-04-03 RX ADMIN — SENNA PLUS 2 TABLET(S): 8.6 TABLET ORAL at 21:09

## 2024-04-03 RX ADMIN — Medication 2000 MILLIGRAM(S): at 08:29

## 2024-04-03 RX ADMIN — ENOXAPARIN SODIUM 40 MILLIGRAM(S): 100 INJECTION SUBCUTANEOUS at 05:26

## 2024-04-03 RX ADMIN — PANTOPRAZOLE SODIUM 40 MILLIGRAM(S): 20 TABLET, DELAYED RELEASE ORAL at 10:14

## 2024-04-03 RX ADMIN — Medication 150 MILLIGRAM(S): at 10:15

## 2024-04-03 RX ADMIN — Medication 1000 MILLIGRAM(S): at 21:09

## 2024-04-03 RX ADMIN — POLYETHYLENE GLYCOL 3350 17 GRAM(S): 17 POWDER, FOR SOLUTION ORAL at 21:09

## 2024-04-03 RX ADMIN — Medication 2000 MILLIGRAM(S): at 00:14

## 2024-04-03 NOTE — OCCUPATIONAL THERAPY INITIAL EVALUATION ADULT - PERTINENT HX OF CURRENT PROBLEM, REHAB EVAL
Pt is a 83 y/o female with significant PMHx of dementia, HTN, HLD, UTI, Anxiety, depression, lumpectomy, B/L TKR about 10 years ago in [place] presented to the ED via EMS with c/o right hip pain s/p fall at home. reportedly, her  fell, and patient fell while trying to help him up. Patient fell on her right hip, and after the fall, she was not able to get up or walk due to moderate to severe pain to her right hip. She denies hitting her head or passing out.  She says that her right hip hurts more when she moves her right leg. She is ambulatory with the help of a walker. Sig labs: CBC and CMP wnl except .  INR 0.88.  EK bpm with RBBB. CXR negative for any acute process. Right hip XR consistent with right femoral neck fracture per ED. Pt is now s/p right hip hemiarthroplasty.

## 2024-04-03 NOTE — PHYSICAL THERAPY INITIAL EVALUATION ADULT - RANGE OF MOTION EXAMINATION, REHAB EVAL
right hip WFL within THPs/bilateral upper extremity ROM was WFL (within functional limits)/bilateral lower extremity ROM was WFL (within functional limits)

## 2024-04-03 NOTE — OCCUPATIONAL THERAPY INITIAL EVALUATION ADULT - NSACTIVITYREC_GEN_A_OT
Pt presents with impaired balance, endurance and muscle strength that will benefit from skilled OT to improve independence in ADLs, reduce fall risk and chance for readmission. Pt educated on posterior hip precautions and it's impact on ADL/IADL and functional mobility tasks. Patient provided with handouts further going over topics discussed, how to use hip kit, and DME/AE recommendations.

## 2024-04-03 NOTE — OCCUPATIONAL THERAPY INITIAL EVALUATION ADULT - PRECAUTIONS/LIMITATIONS, REHAB EVAL
diet: regular, abduction wedge, elevate HOB, notify if systolic >140 <90, HR >100 <50, right POSTERIOR hip precautions/aspiration precautions/cardiac precautions/fall precautions/right hip precautions/surgical precautions

## 2024-04-03 NOTE — PROGRESS NOTE ADULT - ASSESSMENT
84 year old woman admitted with       # Closed right femoral neck fracture s/p mechanical fall without LOC.  -Admit to medical floor.  -NPO except meds, ice chips and sips of water.  -IVF  -Adequate pain control.  -NWB RLE.  -s/p right hip hemiarthroplasty  -Ortho consult reviewed and appreciated.  -Need PT/OT post operative.    # HTN and HLD   -Stable and controlled.  -Continue her Norvasc.  -Resume her Atorvastatin post-op day 1 or 2.    # Anxiety and depression.  -On Venlafaxine.    # DVT prophylaxis: lovenox     Case d/w team on IDR. Plan for dc to rehab pending auth.    84 year old woman admitted with       # Closed right femoral neck fracture s/p mechanical fall without LOC.  -s/p right hip hemiarthroplasty POD#1   -IVF  -Adequate pain control.  -NWB RLE.  -Ortho consult reviewed and appreciated.  -PT     # HTN and HLD   -Stable and controlled.  -c/w antihypertensive regimen   -atorvastatin     # Anxiety and depression.  -On Venlafaxine.    # DVT prophylaxis: lovenox     Case d/w team on IDR. Plan for dc to rehab pending auth.

## 2024-04-03 NOTE — OCCUPATIONAL THERAPY INITIAL EVALUATION ADULT - COGNITIVE, VISUAL PERCEPTUAL, OT EVAL
Pt will recall and adhere to 3/3 posterior hip precautions with 75% accuracy within 2 weeks in order to increase safety with ADL/IADL and functional mobility participation

## 2024-04-03 NOTE — PHYSICAL THERAPY INITIAL EVALUATION ADULT - LEVEL OF INDEPENDENCE: GAIT, REHAB EVAL
minimum assist (75% patients effort)/moderate assist (50% patients effort) Lot # (Optional): [id number] Lot # (Optional): [id number]

## 2024-04-03 NOTE — PHYSICAL THERAPY INITIAL EVALUATION ADULT - PERTINENT HX OF CURRENT PROBLEM, REHAB EVAL
84y Female with significant PMH of dementia, HTN, HLD, UTI, Anxiety, depression, lumpectomy, B/L TKR about 10 years ago in [place] presented to the ED via EMS with c/o right hip pain s/p fall at home. reportedly, her  fell,  and patient fell while trying to help him up. Patient fell on her right hip, and after the fall, she was not able to get up or walk due to moderate to severe pain to her right hip. She denies hitting her head or passing out.  She says that her right hip hurts more when she moves her right leg. She is ambulatory with the help of a walker. She denies fever, chills, chest pain, sob, any prior heart attack, palpitation, headache, N/V, abdominal pain, diarrhea or dysuria.  Also denies smoking, alcohol or substance abuse.

## 2024-04-03 NOTE — OCCUPATIONAL THERAPY INITIAL EVALUATION ADULT - GENERAL OBSERVATIONS, REHAB EVAL
Pt rec'd/left semi-supine in bed, +IV infusing, +KI on RLE, +abduction wedge, lines intact, CBIR, needs met.

## 2024-04-03 NOTE — PHARMACOTHERAPY INTERVENTION NOTE - COMMENTS
Med history complete, reviewed medications and allergies with patients family Howard via phone [phone].  Confirmed medication list with doctor first med profile, all medication related questions answered

## 2024-04-03 NOTE — OCCUPATIONAL THERAPY INITIAL EVALUATION ADULT - ADDITIONAL COMMENTS
Pt is a questionable historian at times, reports she resides in a  with her  with steps to enter and FOS to her bedroom. Pt reports she has a walk in shower with grab bars and a shower chair, and a raised toilet seated. Pt reports she walked with a RW.

## 2024-04-03 NOTE — OCCUPATIONAL THERAPY INITIAL EVALUATION ADULT - ADL RETRAINING, OT EVAL
Pt will perform LBD using hip kit with moderate assist in 2 weeks. Pt will perform toileting with moderate assist in 2 weeks.

## 2024-04-04 LAB
ANION GAP SERPL CALC-SCNC: 5 MMOL/L — SIGNIFICANT CHANGE UP (ref 5–17)
APPEARANCE UR: CLEAR — SIGNIFICANT CHANGE UP
BACTERIA # UR AUTO: ABNORMAL /HPF
BILIRUB UR-MCNC: NEGATIVE — SIGNIFICANT CHANGE UP
BUN SERPL-MCNC: 20 MG/DL — SIGNIFICANT CHANGE UP (ref 7–23)
CALCIUM SERPL-MCNC: 8.5 MG/DL — SIGNIFICANT CHANGE UP (ref 8.5–10.1)
CAST: 6 /LPF — HIGH (ref 0–4)
CHLORIDE SERPL-SCNC: 108 MMOL/L — SIGNIFICANT CHANGE UP (ref 96–108)
CO2 SERPL-SCNC: 27 MMOL/L — SIGNIFICANT CHANGE UP (ref 22–31)
COLOR SPEC: YELLOW — SIGNIFICANT CHANGE UP
CREAT SERPL-MCNC: 0.76 MG/DL — SIGNIFICANT CHANGE UP (ref 0.5–1.3)
DIFF PNL FLD: NEGATIVE — SIGNIFICANT CHANGE UP
EGFR: 76 ML/MIN/1.73M2 — SIGNIFICANT CHANGE UP
GLUCOSE SERPL-MCNC: 100 MG/DL — HIGH (ref 70–99)
GLUCOSE UR QL: NEGATIVE MG/DL — SIGNIFICANT CHANGE UP
HCT VFR BLD CALC: 34.3 % — LOW (ref 34.5–45)
HGB BLD-MCNC: 11.4 G/DL — LOW (ref 11.5–15.5)
HYALINE CASTS # UR AUTO: PRESENT
KETONES UR-MCNC: NEGATIVE MG/DL — SIGNIFICANT CHANGE UP
LEUKOCYTE ESTERASE UR-ACNC: ABNORMAL
MCHC RBC-ENTMCNC: 30.5 PG — SIGNIFICANT CHANGE UP (ref 27–34)
MCHC RBC-ENTMCNC: 33.2 GM/DL — SIGNIFICANT CHANGE UP (ref 32–36)
MCV RBC AUTO: 91.7 FL — SIGNIFICANT CHANGE UP (ref 80–100)
NITRITE UR-MCNC: POSITIVE
PH UR: 5.5 — SIGNIFICANT CHANGE UP (ref 5–8)
PLATELET # BLD AUTO: 201 K/UL — SIGNIFICANT CHANGE UP (ref 150–400)
POTASSIUM SERPL-MCNC: 3.6 MMOL/L — SIGNIFICANT CHANGE UP (ref 3.5–5.3)
POTASSIUM SERPL-SCNC: 3.6 MMOL/L — SIGNIFICANT CHANGE UP (ref 3.5–5.3)
PROT UR-MCNC: NEGATIVE MG/DL — SIGNIFICANT CHANGE UP
RBC # BLD: 3.74 M/UL — LOW (ref 3.8–5.2)
RBC # FLD: 13.1 % — SIGNIFICANT CHANGE UP (ref 10.3–14.5)
RBC CASTS # UR COMP ASSIST: 0 /HPF — SIGNIFICANT CHANGE UP (ref 0–4)
SODIUM SERPL-SCNC: 140 MMOL/L — SIGNIFICANT CHANGE UP (ref 135–145)
SP GR SPEC: 1.02 — SIGNIFICANT CHANGE UP (ref 1–1.03)
SQUAMOUS # UR AUTO: 3 /HPF — SIGNIFICANT CHANGE UP (ref 0–5)
UROBILINOGEN FLD QL: 0.2 MG/DL — SIGNIFICANT CHANGE UP (ref 0.2–1)
WBC # BLD: 8.48 K/UL — SIGNIFICANT CHANGE UP (ref 3.8–10.5)
WBC # FLD AUTO: 8.48 K/UL — SIGNIFICANT CHANGE UP (ref 3.8–10.5)
WBC UR QL: 4 /HPF — SIGNIFICANT CHANGE UP (ref 0–5)

## 2024-04-04 PROCEDURE — 99232 SBSQ HOSP IP/OBS MODERATE 35: CPT

## 2024-04-04 RX ADMIN — Medication 1 MILLIGRAM(S): at 10:27

## 2024-04-04 RX ADMIN — Medication 150 MILLIGRAM(S): at 10:28

## 2024-04-04 RX ADMIN — Medication 1000 MILLIGRAM(S): at 05:10

## 2024-04-04 RX ADMIN — Medication 1 TABLET(S): at 10:27

## 2024-04-04 RX ADMIN — SENNA PLUS 2 TABLET(S): 8.6 TABLET ORAL at 21:21

## 2024-04-04 RX ADMIN — ATORVASTATIN CALCIUM 20 MILLIGRAM(S): 80 TABLET, FILM COATED ORAL at 21:20

## 2024-04-04 RX ADMIN — CELECOXIB 200 MILLIGRAM(S): 200 CAPSULE ORAL at 10:27

## 2024-04-04 RX ADMIN — PANTOPRAZOLE SODIUM 40 MILLIGRAM(S): 20 TABLET, DELAYED RELEASE ORAL at 10:27

## 2024-04-04 RX ADMIN — Medication 500 MILLIGRAM(S): at 21:20

## 2024-04-04 RX ADMIN — Medication 1000 MILLIGRAM(S): at 18:36

## 2024-04-04 RX ADMIN — ENOXAPARIN SODIUM 40 MILLIGRAM(S): 100 INJECTION SUBCUTANEOUS at 05:10

## 2024-04-04 RX ADMIN — Medication 500 MILLIGRAM(S): at 10:27

## 2024-04-04 RX ADMIN — CELECOXIB 200 MILLIGRAM(S): 200 CAPSULE ORAL at 21:20

## 2024-04-04 RX ADMIN — AMLODIPINE BESYLATE 10 MILLIGRAM(S): 2.5 TABLET ORAL at 10:27

## 2024-04-04 RX ADMIN — Medication 325 MILLIGRAM(S): at 10:27

## 2024-04-04 RX ADMIN — POLYETHYLENE GLYCOL 3350 17 GRAM(S): 17 POWDER, FOR SOLUTION ORAL at 21:21

## 2024-04-04 RX ADMIN — Medication 25 MILLIGRAM(S): at 10:27

## 2024-04-04 NOTE — PROGRESS NOTE ADULT - ASSESSMENT
84 year old woman admitted with       # Closed right femoral neck fracture s/p mechanical fall without LOC.  -s/p right hip hemiarthroplasty POD#2  -IVF  -Adequate pain control.  -NWB RLE.  -Ortho consult reviewed and appreciated.  -PT     # HTN and HLD   -Stable and controlled.  -c/w antihypertensive regimen   -atorvastatin     # Anxiety and depression.  -On Venlafaxine.    # DVT prophylaxis: lovenox     Case d/w team on IDR. Plan for dc to rehab pending auth.   Plan d/w daughter at the bedside,

## 2024-04-05 LAB
ANION GAP SERPL CALC-SCNC: 4 MMOL/L — LOW (ref 5–17)
BUN SERPL-MCNC: 14 MG/DL — SIGNIFICANT CHANGE UP (ref 7–23)
CALCIUM SERPL-MCNC: 8.8 MG/DL — SIGNIFICANT CHANGE UP (ref 8.5–10.1)
CHLORIDE SERPL-SCNC: 110 MMOL/L — HIGH (ref 96–108)
CO2 SERPL-SCNC: 27 MMOL/L — SIGNIFICANT CHANGE UP (ref 22–31)
CREAT SERPL-MCNC: 0.61 MG/DL — SIGNIFICANT CHANGE UP (ref 0.5–1.3)
EGFR: 87 ML/MIN/1.73M2 — SIGNIFICANT CHANGE UP
GLUCOSE SERPL-MCNC: 90 MG/DL — SIGNIFICANT CHANGE UP (ref 70–99)
HCT VFR BLD CALC: 38.2 % — SIGNIFICANT CHANGE UP (ref 34.5–45)
HGB BLD-MCNC: 12.6 G/DL — SIGNIFICANT CHANGE UP (ref 11.5–15.5)
MCHC RBC-ENTMCNC: 30.3 PG — SIGNIFICANT CHANGE UP (ref 27–34)
MCHC RBC-ENTMCNC: 33 GM/DL — SIGNIFICANT CHANGE UP (ref 32–36)
MCV RBC AUTO: 91.8 FL — SIGNIFICANT CHANGE UP (ref 80–100)
PLATELET # BLD AUTO: 223 K/UL — SIGNIFICANT CHANGE UP (ref 150–400)
POTASSIUM SERPL-MCNC: 3.8 MMOL/L — SIGNIFICANT CHANGE UP (ref 3.5–5.3)
POTASSIUM SERPL-SCNC: 3.8 MMOL/L — SIGNIFICANT CHANGE UP (ref 3.5–5.3)
RBC # BLD: 4.16 M/UL — SIGNIFICANT CHANGE UP (ref 3.8–5.2)
RBC # FLD: 12.8 % — SIGNIFICANT CHANGE UP (ref 10.3–14.5)
SODIUM SERPL-SCNC: 141 MMOL/L — SIGNIFICANT CHANGE UP (ref 135–145)
WBC # BLD: 8.32 K/UL — SIGNIFICANT CHANGE UP (ref 3.8–10.5)
WBC # FLD AUTO: 8.32 K/UL — SIGNIFICANT CHANGE UP (ref 3.8–10.5)

## 2024-04-05 PROCEDURE — 99232 SBSQ HOSP IP/OBS MODERATE 35: CPT

## 2024-04-05 RX ADMIN — Medication 1000 MILLIGRAM(S): at 02:47

## 2024-04-05 RX ADMIN — Medication 1000 MILLIGRAM(S): at 18:59

## 2024-04-05 RX ADMIN — Medication 25 MILLIGRAM(S): at 09:35

## 2024-04-05 RX ADMIN — CELECOXIB 200 MILLIGRAM(S): 200 CAPSULE ORAL at 09:37

## 2024-04-05 RX ADMIN — Medication 1000 MILLIGRAM(S): at 09:36

## 2024-04-05 RX ADMIN — CELECOXIB 200 MILLIGRAM(S): 200 CAPSULE ORAL at 21:16

## 2024-04-05 RX ADMIN — PANTOPRAZOLE SODIUM 40 MILLIGRAM(S): 20 TABLET, DELAYED RELEASE ORAL at 09:37

## 2024-04-05 RX ADMIN — Medication 500 MILLIGRAM(S): at 09:37

## 2024-04-05 RX ADMIN — SENNA PLUS 2 TABLET(S): 8.6 TABLET ORAL at 21:16

## 2024-04-05 RX ADMIN — POLYETHYLENE GLYCOL 3350 17 GRAM(S): 17 POWDER, FOR SOLUTION ORAL at 21:17

## 2024-04-05 RX ADMIN — Medication 1 TABLET(S): at 09:36

## 2024-04-05 RX ADMIN — AMLODIPINE BESYLATE 10 MILLIGRAM(S): 2.5 TABLET ORAL at 09:36

## 2024-04-05 RX ADMIN — Medication 150 MILLIGRAM(S): at 09:38

## 2024-04-05 RX ADMIN — Medication 500 MILLIGRAM(S): at 21:16

## 2024-04-05 RX ADMIN — ENOXAPARIN SODIUM 40 MILLIGRAM(S): 100 INJECTION SUBCUTANEOUS at 05:50

## 2024-04-05 RX ADMIN — Medication 325 MILLIGRAM(S): at 09:35

## 2024-04-05 RX ADMIN — ATORVASTATIN CALCIUM 20 MILLIGRAM(S): 80 TABLET, FILM COATED ORAL at 21:16

## 2024-04-05 RX ADMIN — Medication 1 MILLIGRAM(S): at 09:36

## 2024-04-05 RX ADMIN — Medication 1000 MILLIGRAM(S): at 18:57

## 2024-04-05 NOTE — PROGRESS NOTE ADULT - ASSESSMENT
84 year old woman admitted with       # Closed right femoral neck fracture s/p mechanical fall without LOC.  -s/p right hip hemiarthroplasty POD#3  -IVF- completed   -Adequate pain control  -Ortho consult reviewed and appreciated.  -PT     # HTN and HLD   -Stable and controlled.  -c/w antihypertensive regimen   -atorvastatin     # Anxiety and depression.  -On Venlafaxine.    # DVT prophylaxis: lovenox     Case d/w team on IDR. Plan for dc to rehab pending auth.   Plan d/w daughter at the bedside,

## 2024-04-06 LAB
AMMONIA BLD-MCNC: 18 UMOL/L — SIGNIFICANT CHANGE UP (ref 11–32)
ANION GAP SERPL CALC-SCNC: 5 MMOL/L — SIGNIFICANT CHANGE UP (ref 5–17)
BUN SERPL-MCNC: 12 MG/DL — SIGNIFICANT CHANGE UP (ref 7–23)
CALCIUM SERPL-MCNC: 8.1 MG/DL — LOW (ref 8.5–10.1)
CHLORIDE SERPL-SCNC: 105 MMOL/L — SIGNIFICANT CHANGE UP (ref 96–108)
CO2 SERPL-SCNC: 28 MMOL/L — SIGNIFICANT CHANGE UP (ref 22–31)
CREAT SERPL-MCNC: 0.5 MG/DL — SIGNIFICANT CHANGE UP (ref 0.5–1.3)
EGFR: 91 ML/MIN/1.73M2 — SIGNIFICANT CHANGE UP
GLUCOSE BLDC GLUCOMTR-MCNC: 91 MG/DL — SIGNIFICANT CHANGE UP (ref 70–99)
GLUCOSE SERPL-MCNC: 95 MG/DL — SIGNIFICANT CHANGE UP (ref 70–99)
HCT VFR BLD CALC: 33 % — LOW (ref 34.5–45)
HGB BLD-MCNC: 11 G/DL — LOW (ref 11.5–15.5)
MAGNESIUM SERPL-MCNC: 2.1 MG/DL — SIGNIFICANT CHANGE UP (ref 1.6–2.6)
MCHC RBC-ENTMCNC: 30.2 PG — SIGNIFICANT CHANGE UP (ref 27–34)
MCHC RBC-ENTMCNC: 33.3 GM/DL — SIGNIFICANT CHANGE UP (ref 32–36)
MCV RBC AUTO: 90.7 FL — SIGNIFICANT CHANGE UP (ref 80–100)
PLATELET # BLD AUTO: 235 K/UL — SIGNIFICANT CHANGE UP (ref 150–400)
POTASSIUM SERPL-MCNC: 3.6 MMOL/L — SIGNIFICANT CHANGE UP (ref 3.5–5.3)
POTASSIUM SERPL-SCNC: 3.6 MMOL/L — SIGNIFICANT CHANGE UP (ref 3.5–5.3)
RBC # BLD: 3.64 M/UL — LOW (ref 3.8–5.2)
RBC # FLD: 12.9 % — SIGNIFICANT CHANGE UP (ref 10.3–14.5)
SODIUM SERPL-SCNC: 138 MMOL/L — SIGNIFICANT CHANGE UP (ref 135–145)
WBC # BLD: 6.6 K/UL — SIGNIFICANT CHANGE UP (ref 3.8–10.5)
WBC # FLD AUTO: 6.6 K/UL — SIGNIFICANT CHANGE UP (ref 3.8–10.5)

## 2024-04-06 PROCEDURE — 70496 CT ANGIOGRAPHY HEAD: CPT | Mod: 26

## 2024-04-06 PROCEDURE — 99291 CRITICAL CARE FIRST HOUR: CPT

## 2024-04-06 PROCEDURE — 70498 CT ANGIOGRAPHY NECK: CPT | Mod: 26

## 2024-04-06 PROCEDURE — 93010 ELECTROCARDIOGRAM REPORT: CPT

## 2024-04-06 PROCEDURE — 0042T: CPT

## 2024-04-06 PROCEDURE — 99223 1ST HOSP IP/OBS HIGH 75: CPT

## 2024-04-06 PROCEDURE — 70450 CT HEAD/BRAIN W/O DYE: CPT | Mod: 26,XU

## 2024-04-06 RX ORDER — CEFTRIAXONE 500 MG/1
1000 INJECTION, POWDER, FOR SOLUTION INTRAMUSCULAR; INTRAVENOUS EVERY 24 HOURS
Refills: 0 | Status: DISCONTINUED | OUTPATIENT
Start: 2024-04-06 | End: 2024-04-06

## 2024-04-06 RX ORDER — CEFTRIAXONE 500 MG/1
1000 INJECTION, POWDER, FOR SOLUTION INTRAMUSCULAR; INTRAVENOUS EVERY 24 HOURS
Refills: 0 | Status: COMPLETED | OUTPATIENT
Start: 2024-04-06 | End: 2024-04-08

## 2024-04-06 RX ADMIN — CELECOXIB 200 MILLIGRAM(S): 200 CAPSULE ORAL at 21:22

## 2024-04-06 RX ADMIN — CELECOXIB 200 MILLIGRAM(S): 200 CAPSULE ORAL at 11:06

## 2024-04-06 RX ADMIN — POLYETHYLENE GLYCOL 3350 17 GRAM(S): 17 POWDER, FOR SOLUTION ORAL at 21:23

## 2024-04-06 RX ADMIN — ENOXAPARIN SODIUM 40 MILLIGRAM(S): 100 INJECTION SUBCUTANEOUS at 05:04

## 2024-04-06 RX ADMIN — Medication 1000 MILLIGRAM(S): at 10:07

## 2024-04-06 RX ADMIN — Medication 325 MILLIGRAM(S): at 10:06

## 2024-04-06 RX ADMIN — PANTOPRAZOLE SODIUM 40 MILLIGRAM(S): 20 TABLET, DELAYED RELEASE ORAL at 10:07

## 2024-04-06 RX ADMIN — AMLODIPINE BESYLATE 10 MILLIGRAM(S): 2.5 TABLET ORAL at 10:07

## 2024-04-06 RX ADMIN — SENNA PLUS 2 TABLET(S): 8.6 TABLET ORAL at 21:23

## 2024-04-06 RX ADMIN — Medication 500 MILLIGRAM(S): at 21:22

## 2024-04-06 RX ADMIN — Medication 1000 MILLIGRAM(S): at 11:07

## 2024-04-06 RX ADMIN — ATORVASTATIN CALCIUM 20 MILLIGRAM(S): 80 TABLET, FILM COATED ORAL at 21:22

## 2024-04-06 RX ADMIN — Medication 500 MILLIGRAM(S): at 10:07

## 2024-04-06 RX ADMIN — CELECOXIB 200 MILLIGRAM(S): 200 CAPSULE ORAL at 10:06

## 2024-04-06 RX ADMIN — Medication 25 MILLIGRAM(S): at 10:07

## 2024-04-06 RX ADMIN — Medication 1 MILLIGRAM(S): at 10:07

## 2024-04-06 RX ADMIN — CEFTRIAXONE 1000 MILLIGRAM(S): 500 INJECTION, POWDER, FOR SOLUTION INTRAMUSCULAR; INTRAVENOUS at 13:31

## 2024-04-06 NOTE — SWALLOW BEDSIDE ASSESSMENT ADULT - SWALLOW EVAL: FEEDING ASSISTANCE
PT ABLE TO FEED SELF BUT HAS A H/O DEMENTIA. AS SUCH, SHE MAY BENEFIT FROM FEEDING ASSISTANCE AT TIMES OF INCREASED CONFUSION.

## 2024-04-06 NOTE — STROKE CODE NOTE - NSMDCONSULT QTN_Y FT
Neurology Consult requested by:   Patient is a 84y old  Female who presents with a chief complaint of Right femoral neck fracture. (06 Apr 2024 13:15)     HPI:  Chief Complaint: fall.   84y woman with significant PMH of dementia, HTN, HLD, UTI, Anxiety, depression, lumpectomy, B/L TKR about 10 years ago in [place] presented to the ED  s/p fall at home. s/p right femoral neck fracture, s/p ORIF. Today this Am around 1:40 PM, noted to be min responsive. CODE STROKE called.   Daughter at bedside. Has been noted to be more lethargic, started on antibiotics for UTI.       (02 Apr 2024 05:26)      PAST MEDICAL & SURGICAL HISTORY:  UTI (urinary tract infection)      HTN (hypertension)      HLD (hyperlipidemia)      Anxiety and depression      Dementia      Fracture of femoral neck, right      H/O total knee replacement, bilateral      S/P lumpectomy, right breast        FAMILY HISTORY:  Family history of hypertension (Sibling)      Social Hx:  Nonsmoker, no drug or alcohol use  Medications and Allergies ReviewedMEDICATIONS  (STANDING):  acetaminophen     Tablet .. 1000 milliGRAM(s) Oral every 8 hours  amLODIPine   Tablet 10 milliGRAM(s) Oral daily  ascorbic acid 500 milliGRAM(s) Oral two times a day  atorvastatin 20 milliGRAM(s) Oral at bedtime  cefTRIAXone Injectable. 1000 milliGRAM(s) IV Push every 24 hours  celecoxib 200 milliGRAM(s) Oral every 12 hours  enoxaparin Injectable 40 milliGRAM(s) SubCutaneous every 24 hours  ferrous    sulfate 325 milliGRAM(s) Oral daily  folic acid 1 milliGRAM(s) Oral daily  metoprolol succinate ER 25 milliGRAM(s) Oral daily  multivitamin 1 Tablet(s) Oral daily  pantoprazole    Tablet 40 milliGRAM(s) Oral daily  polyethylene glycol 3350 17 Gram(s) Oral at bedtime  senna 2 Tablet(s) Oral at bedtime  senna 2 Tablet(s) Oral at bedtime  tranexamic acid IVPB 1000 milliGRAM(s) IV Intermittent once  tranexamic acid IVPB 1000 milliGRAM(s) IV Intermittent once     ROS: Pertinent positives in HPI, all other ROS were reviewed and are negative.      Examination:   Vital Signs Last 24 Hrs  T(C): 36.5 (06 Apr 2024 07:58), Max: 36.6 (06 Apr 2024 00:00)  T(F): 97.7 (06 Apr 2024 07:58), Max: 97.9 (06 Apr 2024 00:00)  HR: 72 (06 Apr 2024 07:58) (70 - 72)  BP: 148/73 (06 Apr 2024 07:58) (145/57 - 148/73)  BP(mean): --  RR: 17 (06 Apr 2024 07:58) (17 - 18)  SpO2: 97% (06 Apr 2024 07:58) (94% - 97%)    Parameters below as of 06 Apr 2024 07:58  Patient On (Oxygen Delivery Method): room air      General: NAD   NECK: supple, no masses  ENT: grossly normal hearing   Vascular : no carotid bruits,   Lungs: CTAB  Chest: RRR, no murmurs  Extremities: nontender, no edema  Musculoskeletal: no adventitious movements, no joint stiffness  Skin: no rash    Neurological Examination:  NIHSS:17  MS: Alert, distracted, knows shes at hospital, answers to name, mildly dysarthric, can name and follows some commands   CN: VFFTC, PERLL, EOMI, V1-3 sensation intact, face symmetric, hearing grossly intact, tongue midline, SCM equal bilaterally  Motor: Limited, moces all extremities, bilat UEs drift, strength ~ 3/5, LEs will move feet, some movements noted bilat    Sens: Intact to light touch.    Reflexes: 0-1/4 all over, downgoing toes b/l  Coord:  Limited, cannot test   Gait: Cannot test    Labs: Reviewed  Complete Blood Count (04.05.24 @ 08:26)   WBC Count: 8.32 K/uL  RBC Count: 4.16 M/uL  Hemoglobin: 12.6 g/dL  Hematocrit: 38.2 %  Mean Cell Volume: 91.8 fl  Mean Cell Hemoglobin: 30.3 pg  Mean Cell Hemoglobin Conc: 33.0 gm/dL  Red Cell Distrib Width: 12.8 %  Platelet Count - Automated: 223 K/uL    Basic Metabolic Panel (04.05.24 @ 08:26)   Sodium: 141 mmol/L  Potassium: 3.8 mmol/L  Chloride: 110 mmol/L  Carbon Dioxide: 27 mmol/L  Anion Gap: 4 mmol/L  Blood Urea Nitrogen: 14 mg/dL  Creatinine: 0.61 mg/dL  Glucose: 90 mg/dL  Calcium: 8.8 mg/dL  eGFR: 87:    Assessment: 85 yr old woman hx of dementia, s/p R ORIF, this afternoon less responsive, exam limited but grossly non focal, NIHSS 17. Possible metabolic encephalopathy. Imaging ordered, pending  Suggest:  check labs from today  serum ammonia  "fever" w/u Neurology Consult requested by:   Patient is a 84y old  Female who presents with a chief complaint of Right femoral neck fracture. (06 Apr 2024 13:15)     HPI:  Chief Complaint: fall.   84y woman with significant PMH of dementia, HTN, HLD, UTI, Anxiety, depression, lumpectomy, B/L TKR about [place] ago in [place] presented to the ED  s/p fall at home. s/p right femoral neck fracture, s/p ORIF. Today this Am around 1:40 PM, noted to be min responsive. CODE STROKE called.   Daughter at bedside. Has been noted to be more lethargic, started on antibiotics for UTI.       (02 Apr 2024 05:26)      PAST MEDICAL & SURGICAL HISTORY:  UTI (urinary tract infection)      HTN (hypertension)      HLD (hyperlipidemia)      Anxiety and depression      Dementia      Fracture of femoral neck, right      H/O total knee replacement, bilateral      S/P lumpectomy, right breast        FAMILY HISTORY:  Family history of hypertension (Sibling)      Social Hx:  Nonsmoker, no drug or alcohol use  Medications and Allergies ReviewedMEDICATIONS  (STANDING):  acetaminophen     Tablet .. 1000 milliGRAM(s) Oral every 8 hours  amLODIPine   Tablet 10 milliGRAM(s) Oral daily  ascorbic acid 500 milliGRAM(s) Oral two times a day  atorvastatin 20 milliGRAM(s) Oral at bedtime  cefTRIAXone Injectable. 1000 milliGRAM(s) IV Push every 24 hours  celecoxib 200 milliGRAM(s) Oral every 12 hours  enoxaparin Injectable 40 milliGRAM(s) SubCutaneous every 24 hours  ferrous    sulfate 325 milliGRAM(s) Oral daily  folic acid 1 milliGRAM(s) Oral daily  metoprolol succinate ER 25 milliGRAM(s) Oral daily  multivitamin 1 Tablet(s) Oral daily  pantoprazole    Tablet 40 milliGRAM(s) Oral daily  polyethylene glycol 3350 17 Gram(s) Oral at bedtime  senna 2 Tablet(s) Oral at bedtime  senna 2 Tablet(s) Oral at bedtime  tranexamic acid IVPB 1000 milliGRAM(s) IV Intermittent once  tranexamic acid IVPB 1000 milliGRAM(s) IV Intermittent once     ROS: Pertinent positives in HPI, all other ROS were reviewed and are negative.      Examination:   Vital Signs Last 24 Hrs  T(C): 36.5 (06 Apr 2024 07:58), Max: 36.6 (06 Apr 2024 00:00)  T(F): 97.7 (06 Apr 2024 07:58), Max: 97.9 (06 Apr 2024 00:00)  HR: 72 (06 Apr 2024 07:58) (70 - 72)  BP: 148/73 (06 Apr 2024 07:58) (145/57 - 148/73)  BP(mean): --  RR: 17 (06 Apr 2024 07:58) (17 - 18)  SpO2: 97% (06 Apr 2024 07:58) (94% - 97%)    Parameters below as of 06 Apr 2024 07:58  Patient On (Oxygen Delivery Method): room air      General: NAD   NECK: supple, no masses  ENT: grossly normal hearing   Vascular : no carotid bruits,   Lungs: CTAB  Chest: RRR, no murmurs  Extremities: nontender, no edema  Musculoskeletal: no adventitious movements, no joint stiffness  Skin: no rash    Neurological Examination:  NIHSS:17  MS: Alert, distracted, knows shes at hospital, answers to name, mildly dysarthric, can name and follows some commands   CN: VFFTC, PERLL, EOMI, V1-3 sensation intact, face symmetric, hearing grossly intact, tongue midline, SCM equal bilaterally  Motor: Limited, moces all extremities, bilat UEs drift, strength ~ 3/5, LEs will move feet, some movements noted bilat    Sens: Intact to light touch.    Reflexes: 0-1/4 all over, downgoing toes b/l  Coord:  Limited, cannot test   Gait: Cannot test    Labs: Reviewed  Complete Blood Count (04.05.24 @ 08:26)   WBC Count: 8.32 K/uL  RBC Count: 4.16 M/uL  Hemoglobin: 12.6 g/dL  Hematocrit: 38.2 %  Mean Cell Volume: 91.8 fl  Mean Cell Hemoglobin: 30.3 pg  Mean Cell Hemoglobin Conc: 33.0 gm/dL  Red Cell Distrib Width: 12.8 %  Platelet Count - Automated: 223 K/uL    Basic Metabolic Panel (04.05.24 @ 08:26)   Sodium: 141 mmol/L  Potassium: 3.8 mmol/L  Chloride: 110 mmol/L  Carbon Dioxide: 27 mmol/L  Anion Gap: 4 mmol/L  Blood Urea Nitrogen: 14 mg/dL  Creatinine: 0.61 mg/dL  Glucose: 90 mg/dL  Calcium: 8.8 mg/dL  eGFR: 87:    Assessment: 85 yr old woman hx of dementia, s/p R ORIF, this afternoon less responsive, exam limited but grossly non focal, NIHSS 17. Possible metabolic encephalopathy. Imaging ordered, CT head shows no acute changes. reading pending  Suggest:  check labs from today  serum ammonia  "fever" w/u

## 2024-04-06 NOTE — SWALLOW BEDSIDE ASSESSMENT ADULT - SLP GENERAL OBSERVATIONS
On encounter, the pt was alert and interactive but communicatively passive as well as internally distractible at times. The pt was able to verbalize during communicative. At these times, pt's motor speech integrity was preserved and her verbalizations were linguistically intact, albeit often brief/sometimes tangential/sometimes contextually irrelevant indicative of Cognitive Dysfunction which is chronic from Dementia. Pt's daughter and son feel that she is at speech-language baseline.

## 2024-04-06 NOTE — SWALLOW BEDSIDE ASSESSMENT ADULT - SWALLOW EVAL: RECOMMENDED DIET
SUGGEST A REGULAR CONSISTENCY DIET WITH THIN LIQUID TEXTURES AS THE PATIENT APPEARED CLINICALLY TOLERANT OF THESE FOOD CONSISTENCIES FROM AN OROPHARYNGEAL SWALLOWING PERSPECTIVE ON EXAM, DESPITE EDENTULOUS STATUS. FURTHER, FOOD CONSISTENCIES ON THIS DIET ACCOMMODATES HER REPORTED FOOD PREFERENCES.

## 2024-04-06 NOTE — SWALLOW BEDSIDE ASSESSMENT ADULT - SWALLOW EVAL: CRITERIA FOR SKILLED INTERVENTION MET
DO NOT FEEL THAT ACUTE SPEECH PATHOLOGY FOLLOW UP WOULD CHANGE CLINICAL MANAGEMENT/OUTCOME IN HOSPITAL SETTING. NO ORAL MOTOR DYSFUNCTION EVIDENT. NO PHARYNGEAL DYSFUNCTION EVIDENT. NO PRIMARY SPEECH-LANGUAGE PATHOLOGY EVIDENT. PT'S COGNITIVE DYSFUNCTION IS CHRONIC/PRE-EXISTING. PT IS FELT TO BE AT SPEECH-LANGUAGE/OROPHARYNGEAL BASELINE. GIVEN ABOVE, THIS SERVICE WILL NOT ACTIVELY FOLLOW. RECONSULT PRN SHOULD STATUS CHANGE AND CONDITION WARRANT.

## 2024-04-06 NOTE — SWALLOW BEDSIDE ASSESSMENT ADULT - SWALLOW EVAL: PROGNOSIS
2) The pt is edentulous but daughter stated that she is able to tolerate food consistencies on a regular consistency diet. Daughter also stated that her mother prefers to be on a regular consistency diet and can choose specific foods/cut foods on her own to accommodate missing dentition. On exam, pt's Oral Processing with foods that require mastication are mildly to moderately prolonged in setting of edentulous status but mechanically functional nonetheless. Bolus formation/transfer with puree food-liquids were achieved via functional age acceptable piecemeal deglutition. Pharyngeal integrity is felt to be functional for age as well. NO behavioral aspiration signs exhibited. No change in O2 sats noted. Odynophagia was denied. Oropharyngeal swallowing integrity at usual state.

## 2024-04-06 NOTE — SWALLOW BEDSIDE ASSESSMENT ADULT - COMMENTS
The pt was admitted to  status post fall with right hip pain. Hospital course is remarkable for right femur fracture The pt was admitted to  status post fall with right hip pain. Hospital course is remarkable for right femur fracture status post repair, abnormal UA and episode of reduced responsiveness. A Code Stroke was called. CTH negative. Alertness than improved. This profile is superimposed upon a history of Dementia, depression, anxiety, HTN, HLD, prior right lumpectomy and past bilateral TKR's.

## 2024-04-06 NOTE — SWALLOW BEDSIDE ASSESSMENT ADULT - NS SPL SWALLOW CLINIC TRIAL FT
The patient is edentulous but daughter stated that she is able to tolerate food consistencies on a regular consistency diet. Daughter also stated that her mother prefers to be on a regular consistency diet and can choose specific foods/cut foods on her own to accommodate missing dentition. On exam, pt's Oral Processing with foods that require mastication are mildly to moderately prolonged in setting of edentulous status but mechanically functional nonetheless. Bolus formation/transfer with puree food-liquids were achieved via functional age acceptable piecemeal deglutition. Pharyngeal integrity is felt to be functional for age as well. Swallow triggered in an acceptable time frame for age and laryngeal lift on palpation during swallowing trials was felt to be functional for age as well. NO behavioral aspiration signs exhibited. No change in O2 sats noted. Odynophagia was denied. Oropharyngeal swallowing integrity at usual state.

## 2024-04-06 NOTE — PROGRESS NOTE ADULT - ASSESSMENT
84 year old woman admitted with       # Closed right femoral neck fracture s/p mechanical fall without LOC.  -s/p right hip hemiarthroplasty POD#3  -IVF- completed   -Adequate pain control  -Ortho consult reviewed and appreciated.  -PT     # metabolic encephalopathy - possibly due to venlafaxine which has been stopped or abnl UA - start ceftriaxone empirically X 3 days  change to ceftin po on monday     # HTN and HLD   -Stable and controlled.  -c/w antihypertensive regimen   -atorvastatin     # Anxiety and depression.  -On Venlafaxine. no longer taking this as per daughter.  will dc. pt had catatonia like reaction last time.    # DVT prophylaxis: lovenox     Case d/w team on IDR. Plan for dc to rehab pending auth.   Plan d/w daughter at the bedside,

## 2024-04-06 NOTE — SWALLOW BEDSIDE ASSESSMENT ADULT - SWALLOW EVAL: DIAGNOSIS
1) On encounter, the pt was alert and interactive but communicatively passive as well as internally distractible at times. The pt was able to verbalize during communicative. At these times, pt's motor speech integrity was preserved and her verbalizations were linguistically intact, albeit often brief/sometimes tangential/sometimes contextually irrelevant indicative of Cognitive Dysfunction which is chronic from Dementia. Pt's daughter and son feel that she is at speech-language baseline.

## 2024-04-07 LAB
ANION GAP SERPL CALC-SCNC: 4 MMOL/L — LOW (ref 5–17)
BUN SERPL-MCNC: 15 MG/DL — SIGNIFICANT CHANGE UP (ref 7–23)
CALCIUM SERPL-MCNC: 8.4 MG/DL — LOW (ref 8.5–10.1)
CHLORIDE SERPL-SCNC: 107 MMOL/L — SIGNIFICANT CHANGE UP (ref 96–108)
CO2 SERPL-SCNC: 28 MMOL/L — SIGNIFICANT CHANGE UP (ref 22–31)
CREAT SERPL-MCNC: 0.68 MG/DL — SIGNIFICANT CHANGE UP (ref 0.5–1.3)
EGFR: 85 ML/MIN/1.73M2 — SIGNIFICANT CHANGE UP
GLUCOSE SERPL-MCNC: 100 MG/DL — HIGH (ref 70–99)
HCT VFR BLD CALC: 38.2 % — SIGNIFICANT CHANGE UP (ref 34.5–45)
HGB BLD-MCNC: 12.6 G/DL — SIGNIFICANT CHANGE UP (ref 11.5–15.5)
MCHC RBC-ENTMCNC: 30.3 PG — SIGNIFICANT CHANGE UP (ref 27–34)
MCHC RBC-ENTMCNC: 33 GM/DL — SIGNIFICANT CHANGE UP (ref 32–36)
MCV RBC AUTO: 91.8 FL — SIGNIFICANT CHANGE UP (ref 80–100)
PHOSPHATE SERPL-MCNC: 3.5 MG/DL — SIGNIFICANT CHANGE UP (ref 2.5–4.5)
PLATELET # BLD AUTO: 268 K/UL — SIGNIFICANT CHANGE UP (ref 150–400)
POTASSIUM SERPL-MCNC: 4.2 MMOL/L — SIGNIFICANT CHANGE UP (ref 3.5–5.3)
POTASSIUM SERPL-SCNC: 4.2 MMOL/L — SIGNIFICANT CHANGE UP (ref 3.5–5.3)
RBC # BLD: 4.16 M/UL — SIGNIFICANT CHANGE UP (ref 3.8–5.2)
RBC # FLD: 13.2 % — SIGNIFICANT CHANGE UP (ref 10.3–14.5)
SODIUM SERPL-SCNC: 139 MMOL/L — SIGNIFICANT CHANGE UP (ref 135–145)
WBC # BLD: 6.97 K/UL — SIGNIFICANT CHANGE UP (ref 3.8–10.5)
WBC # FLD AUTO: 6.97 K/UL — SIGNIFICANT CHANGE UP (ref 3.8–10.5)

## 2024-04-07 PROCEDURE — 99232 SBSQ HOSP IP/OBS MODERATE 35: CPT

## 2024-04-07 RX ADMIN — Medication 500 MILLIGRAM(S): at 09:33

## 2024-04-07 RX ADMIN — Medication 1000 MILLIGRAM(S): at 17:33

## 2024-04-07 RX ADMIN — CELECOXIB 200 MILLIGRAM(S): 200 CAPSULE ORAL at 09:33

## 2024-04-07 RX ADMIN — ENOXAPARIN SODIUM 40 MILLIGRAM(S): 100 INJECTION SUBCUTANEOUS at 05:33

## 2024-04-07 RX ADMIN — Medication 1000 MILLIGRAM(S): at 18:19

## 2024-04-07 RX ADMIN — SENNA PLUS 2 TABLET(S): 8.6 TABLET ORAL at 21:20

## 2024-04-07 RX ADMIN — Medication 1000 MILLIGRAM(S): at 09:31

## 2024-04-07 RX ADMIN — Medication 25 MILLIGRAM(S): at 09:32

## 2024-04-07 RX ADMIN — Medication 1 MILLIGRAM(S): at 09:34

## 2024-04-07 RX ADMIN — AMLODIPINE BESYLATE 10 MILLIGRAM(S): 2.5 TABLET ORAL at 09:32

## 2024-04-07 RX ADMIN — Medication 1 TABLET(S): at 09:32

## 2024-04-07 RX ADMIN — CELECOXIB 200 MILLIGRAM(S): 200 CAPSULE ORAL at 21:20

## 2024-04-07 RX ADMIN — ATORVASTATIN CALCIUM 20 MILLIGRAM(S): 80 TABLET, FILM COATED ORAL at 21:20

## 2024-04-07 RX ADMIN — Medication 325 MILLIGRAM(S): at 09:32

## 2024-04-07 RX ADMIN — Medication 500 MILLIGRAM(S): at 21:20

## 2024-04-07 RX ADMIN — CEFTRIAXONE 1000 MILLIGRAM(S): 500 INJECTION, POWDER, FOR SOLUTION INTRAMUSCULAR; INTRAVENOUS at 12:35

## 2024-04-07 RX ADMIN — PANTOPRAZOLE SODIUM 40 MILLIGRAM(S): 20 TABLET, DELAYED RELEASE ORAL at 09:33

## 2024-04-07 NOTE — PROGRESS NOTE ADULT - ASSESSMENT
84 year old woman admitted with     # metabolic encephalopathy - resolving  - code stroke on 4/6.  workup negative.  - feel encephalopathy is due to combination of venlafaxine effects in setting of acute UTI  - continue ceftriaxone and change to po ceftin tomorrow upon dc    # Closed right femoral neck fracture s/p mechanical fall without LOC.  -s/p right hip hemiarthroplasty POD#4  -IVF- completed   -Adequate pain control  -Ortho consult reviewed and appreciated.  -PT ongoing     # HTN and HLD   -Stable and controlled.  -c/w antihypertensive regimen   -atorvastatin     # Anxiety and depression.  -On Venlafaxine. no longer taking this as per daughter.  will dc. pt had catatonia like reaction last time.    # DVT prophylaxis: lovenox     Case d/w team on IDR. Plan for dc to rehab pending auth. likely dc tomorrow   Plan d/w daughter at the bedside,

## 2024-04-08 ENCOUNTER — TRANSCRIPTION ENCOUNTER (OUTPATIENT)
Age: 84
End: 2024-04-08

## 2024-04-08 VITALS
SYSTOLIC BLOOD PRESSURE: 118 MMHG | TEMPERATURE: 98 F | OXYGEN SATURATION: 95 % | DIASTOLIC BLOOD PRESSURE: 48 MMHG | HEART RATE: 69 BPM | RESPIRATION RATE: 18 BRPM

## 2024-04-08 PROCEDURE — 99239 HOSP IP/OBS DSCHRG MGMT >30: CPT

## 2024-04-08 RX ORDER — AMLODIPINE BESYLATE 2.5 MG/1
1 TABLET ORAL
Refills: 0 | DISCHARGE

## 2024-04-08 RX ORDER — METOPROLOL TARTRATE 50 MG
1 TABLET ORAL
Refills: 0 | DISCHARGE

## 2024-04-08 RX ORDER — AMLODIPINE BESYLATE 2.5 MG/1
1 TABLET ORAL
Qty: 0 | Refills: 0 | DISCHARGE
Start: 2024-04-08

## 2024-04-08 RX ORDER — ACETAMINOPHEN 500 MG
2 TABLET ORAL
Qty: 0 | Refills: 0 | DISCHARGE
Start: 2024-04-08

## 2024-04-08 RX ORDER — FERROUS SULFATE 325(65) MG
1 TABLET ORAL
Qty: 0 | Refills: 0 | DISCHARGE
Start: 2024-04-08

## 2024-04-08 RX ORDER — ENOXAPARIN SODIUM 100 MG/ML
40 INJECTION SUBCUTANEOUS
Qty: 0 | Refills: 0 | DISCHARGE
Start: 2024-04-08

## 2024-04-08 RX ORDER — ASCORBIC ACID 60 MG
1 TABLET,CHEWABLE ORAL
Qty: 0 | Refills: 0 | DISCHARGE
Start: 2024-04-08

## 2024-04-08 RX ORDER — SENNA PLUS 8.6 MG/1
2 TABLET ORAL
Qty: 0 | Refills: 0 | DISCHARGE
Start: 2024-04-08

## 2024-04-08 RX ORDER — MAGNESIUM HYDROXIDE 400 MG/1
30 TABLET, CHEWABLE ORAL
Qty: 0 | Refills: 0 | DISCHARGE
Start: 2024-04-08

## 2024-04-08 RX ORDER — METOPROLOL TARTRATE 50 MG
1 TABLET ORAL
Qty: 0 | Refills: 0 | DISCHARGE
Start: 2024-04-08

## 2024-04-08 RX ORDER — POLYETHYLENE GLYCOL 3350 17 G/17G
17 POWDER, FOR SOLUTION ORAL
Qty: 0 | Refills: 0 | DISCHARGE
Start: 2024-04-08

## 2024-04-08 RX ORDER — FOLIC ACID 0.8 MG
1 TABLET ORAL
Qty: 0 | Refills: 0 | DISCHARGE
Start: 2024-04-08

## 2024-04-08 RX ORDER — PANTOPRAZOLE SODIUM 20 MG/1
1 TABLET, DELAYED RELEASE ORAL
Qty: 0 | Refills: 0 | DISCHARGE
Start: 2024-04-08

## 2024-04-08 RX ADMIN — ENOXAPARIN SODIUM 40 MILLIGRAM(S): 100 INJECTION SUBCUTANEOUS at 05:28

## 2024-04-08 RX ADMIN — PANTOPRAZOLE SODIUM 40 MILLIGRAM(S): 20 TABLET, DELAYED RELEASE ORAL at 10:16

## 2024-04-08 RX ADMIN — Medication 500 MILLIGRAM(S): at 10:16

## 2024-04-08 RX ADMIN — AMLODIPINE BESYLATE 10 MILLIGRAM(S): 2.5 TABLET ORAL at 10:16

## 2024-04-08 RX ADMIN — Medication 1 MILLIGRAM(S): at 10:16

## 2024-04-08 RX ADMIN — Medication 25 MILLIGRAM(S): at 10:17

## 2024-04-08 RX ADMIN — CEFTRIAXONE 1000 MILLIGRAM(S): 500 INJECTION, POWDER, FOR SOLUTION INTRAMUSCULAR; INTRAVENOUS at 10:46

## 2024-04-08 RX ADMIN — Medication 1000 MILLIGRAM(S): at 10:42

## 2024-04-08 RX ADMIN — Medication 1 TABLET(S): at 10:14

## 2024-04-08 RX ADMIN — CELECOXIB 200 MILLIGRAM(S): 200 CAPSULE ORAL at 10:42

## 2024-04-08 RX ADMIN — Medication 325 MILLIGRAM(S): at 10:15

## 2024-04-08 RX ADMIN — CELECOXIB 200 MILLIGRAM(S): 200 CAPSULE ORAL at 10:15

## 2024-04-08 RX ADMIN — Medication 1000 MILLIGRAM(S): at 10:14

## 2024-04-08 NOTE — PROGRESS NOTE ADULT - SUBJECTIVE AND OBJECTIVE BOX
84 year old woman with history of  dementia, HTN, HLD, UTI, Anxiety, depression, lumpectomy, B/L TKR about 10 years ago in [place] presented to the ED via EMS with c/o right hip pain s/p fall at home. reportedly, her  fell,  and patient fell while trying to help him up. Patient fell on her right hip, and after the fall, she was not able to get up or walk due to moderate to severe pain to her right hip.  Imaging showed  Right Femoral neck fracture- Ortho consulted and plan for right hip hemiarthroplasty.      4/2- pt was seen and examined. Oriented x2- complaining of pain on the right hip. VSS. denies fever, pain or chills.     ROS:   All 10 systems reviewed and found to be negative with the exception of what has been described above.     Vital Signs Last 24 Hrs  T(C): 36.7 (02 Apr 2024 08:00), Max: 36.9 (01 Apr 2024 20:35)  T(F): 98.1 (02 Apr 2024 08:00), Max: 98.5 (01 Apr 2024 20:35)  HR: 59 (02 Apr 2024 08:00) (59 - 68)  BP: 160/60 (02 Apr 2024 08:00) (126/76 - 165/54)  BP(mean): 81 (02 Apr 2024 05:03) (81 - 83)  RR: 16 (02 Apr 2024 08:00) (14 - 17)  SpO2: 99% (02 Apr 2024 08:00) (95% - 100%)    Parameters below as of 02 Apr 2024 08:00  Patient On (Oxygen Delivery Method): room air    PE:  Constitutional: NAD, laying in bed  HEENT: NC/AT  Back: no tenderness  Respiratory: respirations even and non labored, LCTA  Cardiovascular: S1S2 regular, no murmurs  Abdomen: soft, not tender, not distended, positive BS  Genitourinary: voiding  Musculoskeletal: no muscle tenderness, no joint swelling or tenderness  Extremities: no pedal edema   Neurological: no focal deficits    MEDICATIONS  (STANDING):  acetaminophen     Tablet .. 1000 milliGRAM(s) Oral every 8 hours  amLODIPine   Tablet 10 milliGRAM(s) Oral daily  lactated ringers. 1000 milliLiter(s) (125 mL/Hr) IV Continuous <Continuous>  senna 2 Tablet(s) Oral at bedtime  sodium chloride 0.9%. 1000 milliLiter(s) (100 mL/Hr) IV Continuous <Continuous>  tranexamic acid IVPB 1000 milliGRAM(s) IV Intermittent once  tranexamic acid IVPB 1000 milliGRAM(s) IV Intermittent once  venlafaxine XR. 150 milliGRAM(s) Oral daily    MEDICATIONS  (PRN):  magnesium hydroxide Suspension 30 milliLiter(s) Oral daily PRN Constipation  oxyCODONE    IR 2.5 milliGRAM(s) Oral every 4 hours PRN Moderate Pain (4 - 6)  oxyCODONE    IR 5 milliGRAM(s) Oral every 4 hours PRN Severe Pain (7 - 10)  traZODone 25 milliGRAM(s) Oral at bedtime PRN insomnia      04-02    141  |  109<H>  |  20  ----------------------------<  108<H>  4.3   |  29  |  0.70    Ca    8.8      02 Apr 2024 06:26    TPro  x   /  Alb  3.4  /  TBili  x   /  DBili  x   /  AST  x   /  ALT  x   /  AlkPhos  x   04-02                            14.1   7.59  )-----------( 227      ( 02 Apr 2024 06:26 )             43.4       RADIOLOGY    PROCEDURE DATE:  04/02/2024      INTERPRETATION:  EXAM: XR HIP WITH PELVIS 2 OR 3 VIEWS RIGHT, XR CHEST   URGENT, XR HIP WITH PELVIS RIGHT, XR KNEE 3 VIEWS RIGHT, XR FEMUR 2 VIEWS   RIGHT  INDICATION: fall, r/o fx JXR  COMPARISON: See below  IMPRESSION:  AP pelvis and right hip: Right Femoral neck fracture noted with   angulation at the fracture site. There may be extension to the   intratrochanteric region. Follow-up suggested.  Left hip shows no acute fracture. Degenerative changes lower lumbar spine.  Right femur: Right femoral neck fracture with angulation at the fracture   site. There might be some extension into the intratrochanteric region.   Follow-up suggested. Total knee prosthesis incidentally noted. Regional   vascular calcifications present.  Right knee: Total knee prosthesis in near-anatomic alignment. No acute   fracture in the knee region. Right femoral neck region fracture as   discussed above.  Right hip single cross table view. Exam is limited and suboptimal. Right   femoral neck fracture seen on other views presented. Follow-up suggested   as indicated clinically.  CHEST: Some increased interstitial markings in the right infrahilar   region with some elevation of the right hemidiaphragm. Atelectatic change   and/or small effusion should be considered. An early inflammatory   infectious process not excluded. Some atelectatic change at the left base   also noted. Heart is within normal limits in its transthoracic diameter.   Regional osseous structures appropriate for age.        
84 year old woman with history of  dementia, HTN, HLD, UTI, Anxiety, depression, lumpectomy, B/L TKR about 10 years ago in [place] presented to the ED via EMS with c/o right hip pain s/p fall at home. reportedly, her  fell,  and patient fell while trying to help him up. Patient fell on her right hip, and after the fall, she was not able to get up or walk due to moderate to severe pain to her right hip.  Imaging showed  Right Femoral neck fracture- Ortho consulted and plan for right hip hemiarthroplasty.      4/4- POD #2 right hip Hemiarthroplasty- VSS- tolerating diet. Denies CP, SOB- stated that she did not sleep well last night.     ROS:   All 10 systems reviewed and found to be negative with the exception of what has been described above.     Vital Signs Last 24 Hrs  T(C): 36.5 (04 Apr 2024 08:00), Max: 37.7 (03 Apr 2024 16:57)  T(F): 97.7 (04 Apr 2024 08:00), Max: 99.8 (03 Apr 2024 16:57)  HR: 73 (04 Apr 2024 08:00) (64 - 76)  BP: 145/62 (04 Apr 2024 08:00) (105/47 - 145/62)  BP(mean): --  RR: 16 (04 Apr 2024 08:00) (16 - 18)  SpO2: 93% (04 Apr 2024 08:00) (93% - 95%)    Parameters below as of 04 Apr 2024 08:00  Patient On (Oxygen Delivery Method): room air            PE:  Constitutional: NAD, laying in bed  HEENT: NC/AT  Back: no tenderness  Respiratory: respirations even and non labored, LCTA  Cardiovascular: S1S2 regular, no murmurs  Abdomen: soft, not tender, not distended, positive BS  Genitourinary: voiding  Musculoskeletal: no muscle tenderness, no joint swelling or tenderness  Extremities: no pedal edema   Neurological: no focal deficits    MEDICATIONS  (STANDING):  acetaminophen     Tablet .. 1000 milliGRAM(s) Oral every 8 hours  amLODIPine   Tablet 10 milliGRAM(s) Oral daily  ascorbic acid 500 milliGRAM(s) Oral two times a day  atorvastatin 20 milliGRAM(s) Oral at bedtime  celecoxib 200 milliGRAM(s) Oral every 12 hours  enoxaparin Injectable 40 milliGRAM(s) SubCutaneous every 24 hours  ferrous    sulfate 325 milliGRAM(s) Oral daily  folic acid 1 milliGRAM(s) Oral daily  lactated ringers. 1000 milliLiter(s) (125 mL/Hr) IV Continuous <Continuous>  lactated ringers. 1000 milliLiter(s) (75 mL/Hr) IV Continuous <Continuous>  metoprolol succinate ER 25 milliGRAM(s) Oral daily  multivitamin 1 Tablet(s) Oral daily  pantoprazole    Tablet 40 milliGRAM(s) Oral daily  polyethylene glycol 3350 17 Gram(s) Oral at bedtime  senna 2 Tablet(s) Oral at bedtime  senna 2 Tablet(s) Oral at bedtime  sodium chloride 0.9%. 1000 milliLiter(s) (100 mL/Hr) IV Continuous <Continuous>  sodium chloride 0.9%. 1000 milliLiter(s) (75 mL/Hr) IV Continuous <Continuous>  tranexamic acid IVPB 1000 milliGRAM(s) IV Intermittent once  tranexamic acid IVPB 1000 milliGRAM(s) IV Intermittent once  venlafaxine XR. 150 milliGRAM(s) Oral daily    MEDICATIONS  (PRN):  bisacodyl Suppository 10 milliGRAM(s) Rectal daily PRN If no bowel movement by POD#2  bisacodyl Suppository 10 milliGRAM(s) Rectal once PRN Constipation  HYDROmorphone  Injectable 0.5 milliGRAM(s) SubCutaneous every 4 hours PRN Severe Pain (7 - 10)  magnesium hydroxide Suspension 30 milliLiter(s) Oral daily PRN Constipation  ondansetron Injectable 8 milliGRAM(s) IV Push every 8 hours PRN Nausea and/or Vomiting  oxyCODONE    IR 5 milliGRAM(s) Oral every 4 hours PRN Mild Pain (1 - 3)  oxyCODONE    IR 10 milliGRAM(s) Oral every 4 hours PRN Moderate Pain (4 - 6)  oxyCODONE    IR 5 milliGRAM(s) Oral every 4 hours PRN Severe Pain (7 - 10)  oxyCODONE    IR 2.5 milliGRAM(s) Oral every 4 hours PRN Moderate Pain (4 - 6)  prochlorperazine   Injectable 10 milliGRAM(s) IV Push every 8 hours PRN Nausea and/or Vomiting  traZODone 25 milliGRAM(s) Oral at bedtime PRN insomnia        04-04    140  |  108  |  20  ----------------------------<  100<H>  3.6   |  27  |  0.76    Ca    8.5      04 Apr 2024 08:00                          11.4   8.48  )-----------( 201      ( 04 Apr 2024 08:00 )             34.3         RADIOLOGY    PROCEDURE DATE:  04/02/2024    INTERPRETATION:  EXAM: XR HIP WITH PELVIS 2 OR 3 VIEWS RIGHT, XR CHEST   URGENT, XR HIP WITH PELVIS RIGHT, XR KNEE 3 VIEWS RIGHT, XR FEMUR 2 VIEWS   RIGHT  INDICATION: fall, r/o fx JXR  COMPARISON: See below  IMPRESSION:  AP pelvis and right hip: Right Femoral neck fracture noted with   angulation at the fracture site. There may be extension to the   intratrochanteric region. Follow-up suggested.  Left hip shows no acute fracture. Degenerative changes lower lumbar spine.  Right femur: Right femoral neck fracture with angulation at the fracture   site. There might be some extension into the intratrochanteric region.   Follow-up suggested. Total knee prosthesis incidentally noted. Regional   vascular calcifications present.  Right knee: Total knee prosthesis in near-anatomic alignment. No acute   fracture in the knee region. Right femoral neck region fracture as   discussed above.  Right hip single cross table view. Exam is limited and suboptimal. Right   femoral neck fracture seen on other views presented. Follow-up suggested   as indicated clinically.  CHEST: Some increased interstitial markings in the right infrahilar   region with some elevation of the right hemidiaphragm. Atelectatic change   and/or small effusion should be considered. An early inflammatory   infectious process not excluded. Some atelectatic change at the left base   also noted. Heart is within normal limits in its transthoracic diameter.   Regional osseous structures appropriate for age.        
84 year old woman with history of  dementia, HTN, HLD, UTI, Anxiety, depression, lumpectomy, B/L TKR about 10 years ago in [place] presented to the ED via EMS with c/o right hip pain s/p fall at home. reportedly, her  fell,  and patient fell while trying to help him up. Patient fell on her right hip, and after the fall, she was not able to get up or walk due to moderate to severe pain to her right hip.  Imaging showed  Right Femoral neck fracture- Ortho consulted and plan for right hip hemiarthroplasty.      4/5 - Patient was seen and examined. VSS. Denies fever, pain or SOB. Tolerating diet. Confused but follows commands.     ROS:   All 10 systems reviewed and found to be negative with the exception of what has been described above.     Vital Signs Last 24 Hrs  T(C): 36.3 (05 Apr 2024 08:00), Max: 36.7 (04 Apr 2024 16:00)  T(F): 97.4 (05 Apr 2024 08:00), Max: 98.1 (04 Apr 2024 16:00)  HR: 68 (05 Apr 2024 08:00) (66 - 70)  BP: 173/64 (05 Apr 2024 08:00) (136/54 - 173/64)  BP(mean): 75 (04 Apr 2024 16:00) (75 - 75)  RR: 16 (05 Apr 2024 08:00) (16 - 16)  SpO2: 99% (05 Apr 2024 08:00) (95% - 99%)    Parameters below as of 05 Apr 2024 08:00  Patient On (Oxygen Delivery Method): room air      PE:  Constitutional: NAD, laying in bed  HEENT: NC/AT  Back: no tenderness  Respiratory: respirations even and non labored, LCTA  Cardiovascular: S1S2 regular, no murmurs  Abdomen: soft, not tender, not distended, positive BS  Genitourinary: voiding  Musculoskeletal: no muscle tenderness, no joint swelling or tenderness  Extremities: no pedal edema   Neurological: no focal deficits      04-05    141  |  110<H>  |  14  ----------------------------<  90  3.8   |  27  |  0.61    Ca    8.8      05 Apr 2024 08:26                              12.6   8.32  )-----------( 223      ( 05 Apr 2024 08:26 )             38.2         RADIOLOGY    PROCEDURE DATE:  04/02/2024    INTERPRETATION:  EXAM: XR HIP WITH PELVIS 2 OR 3 VIEWS RIGHT, XR CHEST   URGENT, XR HIP WITH PELVIS RIGHT, XR KNEE 3 VIEWS RIGHT, XR FEMUR 2 VIEWS   RIGHT  INDICATION: fall, r/o fx JXR  COMPARISON: See below  IMPRESSION:  AP pelvis and right hip: Right Femoral neck fracture noted with   angulation at the fracture site. There may be extension to the   intratrochanteric region. Follow-up suggested.  Left hip shows no acute fracture. Degenerative changes lower lumbar spine.  Right femur: Right femoral neck fracture with angulation at the fracture   site. There might be some extension into the intratrochanteric region.   Follow-up suggested. Total knee prosthesis incidentally noted. Regional   vascular calcifications present.  Right knee: Total knee prosthesis in near-anatomic alignment. No acute   fracture in the knee region. Right femoral neck region fracture as   discussed above.  Right hip single cross table view. Exam is limited and suboptimal. Right   femoral neck fracture seen on other views presented. Follow-up suggested   as indicated clinically.  CHEST: Some increased interstitial markings in the right infrahilar   region with some elevation of the right hemidiaphragm. Atelectatic change   and/or small effusion should be considered. An early inflammatory   infectious process not excluded. Some atelectatic change at the left base   also noted. Heart is within normal limits in its transthoracic diameter.   Regional osseous structures appropriate for age.        
84 year old woman with history of  dementia, HTN, HLD, UTI, Anxiety, depression, lumpectomy, B/L TKR about 10 years ago in [place] presented to the ED via EMS with c/o right hip pain s/p fall at home. reportedly, her  fell,  and patient fell while trying to help him up. Patient fell on her right hip, and after the fall, she was not able to get up or walk due to moderate to severe pain to her right hip.  Imaging showed  Right Femoral neck fracture- Ortho consulted.  Now s/p Right Hip Hemiarthroplasty for fracture. Pt is afebrile with stable vital signs. Pain is controlled. Exam reveals intact EHL FHL TA GS, +DP. Dressing is clean and dry.    Hospital Course:  Patient presented to [facility] ED after a fall, found to have a right hip fracture, and admitted to the Medical Service. Pt was medically cleared prior to surgery. Prophylactic antibiotics were started before the procedure and continued for 24 hours. They were admitted after surgery to the orthopedic floor.  There were no orthopedic complications during the hospital stay. All home medications were continued.    Routine consult was obtained from Physical Therapy and followed by both Medicine and Orthopedics for Co-management. Patient was placed on  anticoagulation.  Pertinent home medications were continued.  Daily labs were followed.      On POD 0 there were no major issues. Pt received PT daily. The orthopedic Attending is aware and agrees.   Hospital course further complicated with UTI and patient was started on IV antibiotic     4/8- Patient was seen and examined. VSS. No acute overnight events. Denies fever, pain or SOB. Tolerating diet.     Vital Signs Last 24 Hrs  T(C): 36.5 (08 Apr 2024 07:16), Max: 36.8 (07 Apr 2024 16:07)  T(F): 97.7 (08 Apr 2024 07:16), Max: 98.2 (07 Apr 2024 16:07)  HR: 73 (08 Apr 2024 07:16) (72 - 75)  BP: 133/52 (08 Apr 2024 07:16) (106/55 - 133/52)  BP(mean): 71 (07 Apr 2024 16:07) (71 - 71)  RR: 18 (08 Apr 2024 07:16) (17 - 18)  SpO2: 93% (08 Apr 2024 07:16) (93% - 96%)    Parameters below as of 08 Apr 2024 07:16  Patient On (Oxygen Delivery Method): room air    ROS:   All 10 systems reviewed and found to be negative with the exception of what has been described above.         PE:  Constitutional: NAD, laying in bed  HEENT: NC/AT  Back: no tenderness  Respiratory: respirations even and non labored, LCTA  Cardiovascular: S1S2 regular, no murmurs  Abdomen: soft, not tender, not distended, positive BS  Genitourinary: voiding  Musculoskeletal: no muscle tenderness, no joint swelling or tenderness  Extremities: no pedal edema   Neurological: no focal deficits      Medication/laboratory- reviewed      PLAN     # metabolic encephalopathy - resolving  - code stroke on 4/6.  workup negative.  - encephalopathy is due to combination of venlafaxine effects in setting of acute UTI  - continue ceftriaxone and change to po ceftin on dc     # Closed right femoral neck fracture s/p mechanical fall without LOC.  -s/p right hip hemiarthroplasty POD#6  -IVF- completed   -Adequate pain control  -Ortho consult reviewed and appreciated.  -PT ongoing     # HTN and HLD   -Stable and controlled.  -c/w antihypertensive regimen   -atorvastatin     # Anxiety and depression.  -On Venlafaxine. no longer taking this as per daughter.  will dc. pt had catatonia like reaction last time.    # DVT prophylaxis: lovenox     
84 year old woman with history of  dementia, HTN, HLD, UTI, Anxiety, depression, lumpectomy, B/L TKR about 10 years ago in [place] presented to the ED via EMS with c/o right hip pain s/p fall at home. reportedly, her  fell,  and patient fell while trying to help him up. Patient fell on her right hip, and after the fall, she was not able to get up or walk due to moderate to severe pain to her right hip.  Imaging showed  Right Femoral neck fracture- Ortho consulted and plan for right hip hemiarthroplasty.      4/3- POD #1 right hip Hemiarthroplasty - complaining of right hip surgical pain. Tolerating diet. Denies , sob. VSS.     ROS:   All 10 systems reviewed and found to be negative with the exception of what has been described above.     Vital Signs Last 24 Hrs  T(C): 37.4 (03 Apr 2024 08:07), Max: 37.5 (03 Apr 2024 04:00)  T(F): 99.3 (03 Apr 2024 08:07), Max: 99.5 (03 Apr 2024 04:00)  HR: 83 (03 Apr 2024 08:07) (67 - 91)  BP: 133/60 (03 Apr 2024 08:07) (94/50 - 150/61)  BP(mean): --  RR: 18 (03 Apr 2024 08:07) (14 - 20)  SpO2: 96% (03 Apr 2024 08:07) (93% - 100%)    Parameters below as of 03 Apr 2024 08:07  Patient On (Oxygen Delivery Method): room air        PE:  Constitutional: NAD, laying in bed  HEENT: NC/AT  Back: no tenderness  Respiratory: respirations even and non labored, LCTA  Cardiovascular: S1S2 regular, no murmurs  Abdomen: soft, not tender, not distended, positive BS  Genitourinary: voiding  Musculoskeletal: no muscle tenderness, no joint swelling or tenderness  Extremities: no pedal edema   Neurological: no focal deficits    MEDICATIONS  (STANDING):  acetaminophen     Tablet .. 1000 milliGRAM(s) Oral every 8 hours  acetaminophen     Tablet .. 1000 milliGRAM(s) Oral every 8 hours  amLODIPine   Tablet 10 milliGRAM(s) Oral daily  ascorbic acid 500 milliGRAM(s) Oral two times a day  celecoxib 200 milliGRAM(s) Oral every 12 hours  enoxaparin Injectable 40 milliGRAM(s) SubCutaneous every 24 hours  ferrous    sulfate 325 milliGRAM(s) Oral daily  folic acid 1 milliGRAM(s) Oral daily  lactated ringers. 1000 milliLiter(s) (125 mL/Hr) IV Continuous <Continuous>  lactated ringers. 1000 milliLiter(s) (75 mL/Hr) IV Continuous <Continuous>  multivitamin 1 Tablet(s) Oral daily  pantoprazole    Tablet 40 milliGRAM(s) Oral daily  polyethylene glycol 3350 17 Gram(s) Oral at bedtime  senna 2 Tablet(s) Oral at bedtime  senna 2 Tablet(s) Oral at bedtime  sodium chloride 0.9%. 1000 milliLiter(s) (100 mL/Hr) IV Continuous <Continuous>  tranexamic acid IVPB 1000 milliGRAM(s) IV Intermittent once  tranexamic acid IVPB 1000 milliGRAM(s) IV Intermittent once  venlafaxine XR. 150 milliGRAM(s) Oral daily    MEDICATIONS  (PRN):  HYDROmorphone  Injectable 0.5 milliGRAM(s) SubCutaneous every 4 hours PRN Severe Pain (7 - 10)  magnesium hydroxide Suspension 30 milliLiter(s) Oral daily PRN Constipation  ondansetron Injectable 8 milliGRAM(s) IV Push every 8 hours PRN Nausea and/or Vomiting  oxyCODONE    IR 5 milliGRAM(s) Oral every 4 hours PRN Mild Pain (1 - 3)  oxyCODONE    IR 10 milliGRAM(s) Oral every 4 hours PRN Moderate Pain (4 - 6)  oxyCODONE    IR 5 milliGRAM(s) Oral every 4 hours PRN Severe Pain (7 - 10)  oxyCODONE    IR 2.5 milliGRAM(s) Oral every 4 hours PRN Moderate Pain (4 - 6)  prochlorperazine   Injectable 10 milliGRAM(s) IV Push every 8 hours PRN Nausea and/or Vomiting  traZODone 25 milliGRAM(s) Oral at bedtime PRN insomnia        04-03    141  |  110<H>  |  16  ----------------------------<  116<H>  3.9   |  24  |  0.74    Ca    8.6      03 Apr 2024 06:57    TPro  x   /  Alb  3.4  /  TBili  x   /  DBili  x   /  AST  x   /  ALT  x   /  AlkPhos  x   04-02                          11.7   10.77 )-----------( 208      ( 03 Apr 2024 06:57 )             35.2         RADIOLOGY    PROCEDURE DATE:  04/02/2024      INTERPRETATION:  EXAM: XR HIP WITH PELVIS 2 OR 3 VIEWS RIGHT, XR CHEST   URGENT, XR HIP WITH PELVIS RIGHT, XR KNEE 3 VIEWS RIGHT, XR FEMUR 2 VIEWS   RIGHT  INDICATION: fall, r/o fx JXR  COMPARISON: See below  IMPRESSION:  AP pelvis and right hip: Right Femoral neck fracture noted with   angulation at the fracture site. There may be extension to the   intratrochanteric region. Follow-up suggested.  Left hip shows no acute fracture. Degenerative changes lower lumbar spine.  Right femur: Right femoral neck fracture with angulation at the fracture   site. There might be some extension into the intratrochanteric region.   Follow-up suggested. Total knee prosthesis incidentally noted. Regional   vascular calcifications present.  Right knee: Total knee prosthesis in near-anatomic alignment. No acute   fracture in the knee region. Right femoral neck region fracture as   discussed above.  Right hip single cross table view. Exam is limited and suboptimal. Right   femoral neck fracture seen on other views presented. Follow-up suggested   as indicated clinically.  CHEST: Some increased interstitial markings in the right infrahilar   region with some elevation of the right hemidiaphragm. Atelectatic change   and/or small effusion should be considered. An early inflammatory   infectious process not excluded. Some atelectatic change at the left base   also noted. Heart is within normal limits in its transthoracic diameter.   Regional osseous structures appropriate for age.        
84 year old woman with history of  dementia, HTN, HLD, UTI, Anxiety, depression, lumpectomy, B/L TKR about 10 years ago in [place] presented to the ED via EMS with c/o right hip pain s/p fall at home. reportedly, her  fell,  and patient fell while trying to help him up. Patient fell on her right hip, and after the fall, she was not able to get up or walk due to moderate to severe pain to her right hip.  Imaging showed  Right Femoral neck fracture- Ortho consulted and plan for right hip hemiarthroplasty.      4/5 - Patient was seen and examined. VSS. Denies fever, pain or SOB. Tolerating diet. Confused but follows commands.   4/6 - pt less alert today as per daughter. similar reaction with effexor in past.  medication discontinued.  daughter updated at bedside     ROS:   All 10 systems reviewed and found to be negative with the exception of what has been described above.     Vital Signs Last 24 Hrs  T(C): 36.5 (06 Apr 2024 07:58), Max: 36.6 (06 Apr 2024 00:00)  T(F): 97.7 (06 Apr 2024 07:58), Max: 97.9 (06 Apr 2024 00:00)  HR: 72 (06 Apr 2024 07:58) (70 - 72)  BP: 148/73 (06 Apr 2024 07:58) (145/57 - 148/73)  BP(mean): --  RR: 17 (06 Apr 2024 07:58) (17 - 18)  SpO2: 97% (06 Apr 2024 07:58) (94% - 97%)    Parameters below as of 06 Apr 2024 07:58  Patient On (Oxygen Delivery Method): room air        PE:  Constitutional: NAD, laying in bed  HEENT: NC/AT  Back: no tenderness  Respiratory: respirations even and non labored, LCTA  Cardiovascular: S1S2 regular, no murmurs  Abdomen: soft, not tender, not distended, positive BS  Genitourinary: voiding  Musculoskeletal: no muscle tenderness, no joint swelling or tenderness  Extremities: no pedal edema   Neurological: no focal deficits                                12.6   8.32  )-----------( 223      ( 05 Apr 2024 08:26 )             38.2     04-05    141  |  110<H>  |  14  ----------------------------<  90  3.8   |  27  |  0.61    Ca    8.8      05 Apr 2024 08:26              Urinalysis Basic - ( 05 Apr 2024 08:26 )    Color: x / Appearance: x / SG: x / pH: x  Gluc: 90 mg/dL / Ketone: x  / Bili: x / Urobili: x   Blood: x / Protein: x / Nitrite: x   Leuk Esterase: x / RBC: x / WBC x   Sq Epi: x / Non Sq Epi: x / Bacteria: x            RADIOLOGY    PROCEDURE DATE:  04/02/2024    INTERPRETATION:  EXAM: XR HIP WITH PELVIS 2 OR 3 VIEWS RIGHT, XR CHEST   URGENT, XR HIP WITH PELVIS RIGHT, XR KNEE 3 VIEWS RIGHT, XR FEMUR 2 VIEWS   RIGHT  INDICATION: fall, r/o fx JXR  COMPARISON: See below  IMPRESSION:  AP pelvis and right hip: Right Femoral neck fracture noted with   angulation at the fracture site. There may be extension to the   intratrochanteric region. Follow-up suggested.  Left hip shows no acute fracture. Degenerative changes lower lumbar spine.  Right femur: Right femoral neck fracture with angulation at the fracture   site. There might be some extension into the intratrochanteric region.   Follow-up suggested. Total knee prosthesis incidentally noted. Regional   vascular calcifications present.  Right knee: Total knee prosthesis in near-anatomic alignment. No acute   fracture in the knee region. Right femoral neck region fracture as   discussed above.  Right hip single cross table view. Exam is limited and suboptimal. Right   femoral neck fracture seen on other views presented. Follow-up suggested   as indicated clinically.  CHEST: Some increased interstitial markings in the right infrahilar   region with some elevation of the right hemidiaphragm. Atelectatic change   and/or small effusion should be considered. An early inflammatory   infectious process not excluded. Some atelectatic change at the left base   also noted. Heart is within normal limits in its transthoracic diameter.   Regional osseous structures appropriate for age.        
Patient seen and examined at bedside. Pain well controlled with medication. Patient denies any numbness, tingling, weakness, or any other orthopaedic complaint.     Vital Signs Last 24 Hrs  T(C): 36.8 (07 Apr 2024 00:03), Max: 36.8 (07 Apr 2024 00:03)  T(F): 98.2 (07 Apr 2024 00:03), Max: 98.2 (07 Apr 2024 00:03)  HR: 65 (07 Apr 2024 00:03) (65 - 72)  BP: 115/56 (07 Apr 2024 00:03) (115/56 - 148/73)  BP(mean): --  RR: 17 (07 Apr 2024 00:03) (17 - 18)  SpO2: 94% (07 Apr 2024 00:03) (94% - 98%)    Parameters below as of 07 Apr 2024 00:03  Patient On (Oxygen Delivery Method): room air      PE:  General: NAD, resting comfortably in bed  RLE:   Dressing C/D/I  abduction pillow, KI in place  SCDs present bilaterally  Compartments soft and compressible  No calf tenderness bilaterally  +TA/EHL/FHL/GSC  Senstation appears intact leatha/saph/sp/dp/tib; difficulty to asses due to patient mental status  palpable DP/PT    A/P:  84y F s/p R hip stephani POD 5  -PT/OT   -WBAT  -posterior precautions  -Pain Control  -DVT ppx per primary team, currently ordered for lovenox  -FU AM Labs  -Rest, ice  -Incentive Spirometry  -Medical comanagement appreciated  -dispo: [facility], ortho stable for DC planning  -will discuss with Dr. Simon and advise with changes to plan  
SUBJECTIVE:       Pt seen and examined at bedside. No acute events overnight. Interview limited 2/2 dementia. Pain appears well controlled.      LABS:                        11.4   8.48  )-----------( 201      ( 04 Apr 2024 08:00 )             34.3     04-04    140  |  108  |  20  ----------------------------<  100<H>  3.6   |  27  |  0.76    Ca    8.5      04 Apr 2024 08:00        Urinalysis Basic - ( 04 Apr 2024 08:00 )    Color: x / Appearance: x / SG: x / pH: x  Gluc: 100 mg/dL / Ketone: x  / Bili: x / Urobili: x   Blood: x / Protein: x / Nitrite: x   Leuk Esterase: x / RBC: x / WBC x   Sq Epi: x / Non Sq Epi: x / Bacteria: x        VITAL SIGNS:  T(C): 36.4 (04-05-24 @ 00:05), Max: 36.7 (04-04-24 @ 16:00)  HR: 70 (04-05-24 @ 00:05) (66 - 73)  BP: 144/67 (04-05-24 @ 00:05) (136/54 - 145/62)  RR: 16 (04-05-24 @ 00:05) (16 - 16)  SpO2: 95% (04-05-24 @ 00:05) (93% - 97%)      PE:  General: NAD, resting comfortably in bed  RLE:   Dressing C/D/I  abduction pillow, KI in place  SCDs present bilaterally  Compartments soft and compressible  No calf tenderness bilaterally  +TA/EHL/FHL/GSC  SILT leatha/saph/sp/dp/tib  palpable DP/PT    A/P:  84y F s/p R hip stephani POD 3  -PT/OT   -WBAT  -posterior precautions  -Pain Control  -DVT ppx per primary team, currently ordered for lovenox  -FU AM Labs  -Rest, ice, compress the extremity as needed  -Incentive Spirometry  -Medical comanagement appreciated  -dispo: [facility], ortho stable for DC planning  -will discuss with Dr. Simon and advise with changes to plan  
84 year old woman with history of  dementia, HTN, HLD, UTI, Anxiety, depression, lumpectomy, B/L TKR about 10 years ago in [place] presented to the ED via EMS with c/o right hip pain s/p fall at home. reportedly, her  fell,  and patient fell while trying to help him up. Patient fell on her right hip, and after the fall, she was not able to get up or walk due to moderate to severe pain to her right hip.  Imaging showed  Right Femoral neck fracture- Ortho consulted and plan for right hip hemiarthroplasty.      4/5 - Patient was seen and examined. VSS. Denies fever, pain or SOB. Tolerating diet. Confused but follows commands.   4/6 - pt less alert today as per daughter. similar reaction with effexor in past.  medication discontinued.  daughter updated at bedside   4/7 - pt much more alert today and fed herself this morning.  daughter at bedsdie and pt appears more at her baseline.     ROS:   All 10 systems reviewed and found to be negative with the exception of what has been described above.     Vital Signs Last 24 Hrs  T(C): 36.8 (07 Apr 2024 08:02), Max: 36.8 (07 Apr 2024 00:03)  T(F): 98.2 (07 Apr 2024 08:02), Max: 98.2 (07 Apr 2024 00:03)  HR: 70 (07 Apr 2024 08:02) (65 - 72)  BP: 125/62 (07 Apr 2024 08:02) (115/56 - 137/64)  BP(mean): --  RR: 18 (07 Apr 2024 08:02) (17 - 18)  SpO2: 94% (07 Apr 2024 08:02) (94% - 98%)    Parameters below as of 07 Apr 2024 08:02  Patient On (Oxygen Delivery Method): room air        PE:  Constitutional: NAD, laying in bed  HEENT: NC/AT  Back: no tenderness  Respiratory: respirations even and non labored, LCTA  Cardiovascular: S1S2 regular, no murmurs  Abdomen: soft, not tender, not distended, positive BS  Genitourinary: voiding  Musculoskeletal: no muscle tenderness, no joint swelling or tenderness  Extremities: no pedal edema   Neurological: no focal deficits                                12.6   6.97  )-----------( 268      ( 07 Apr 2024 07:42 )             38.2     04-07    139  |  107  |  15  ----------------------------<  100<H>  4.2   |  28  |  0.68    Ca    8.4<L>      07 Apr 2024 07:42  Phos  3.5     04-07  Mg     2.1     04-06              Urinalysis Basic - ( 07 Apr 2024 07:42 )    Color: x / Appearance: x / SG: x / pH: x  Gluc: 100 mg/dL / Ketone: x  / Bili: x / Urobili: x   Blood: x / Protein: x / Nitrite: x   Leuk Esterase: x / RBC: x / WBC x   Sq Epi: x / Non Sq Epi: x / Bacteria: x              RADIOLOGY    PROCEDURE DATE:  04/02/2024    INTERPRETATION:  EXAM: XR HIP WITH PELVIS 2 OR 3 VIEWS RIGHT, XR CHEST   URGENT, XR HIP WITH PELVIS RIGHT, XR KNEE 3 VIEWS RIGHT, XR FEMUR 2 VIEWS   RIGHT  INDICATION: fall, r/o fx JXR  COMPARISON: See below  IMPRESSION:  AP pelvis and right hip: Right Femoral neck fracture noted with   angulation at the fracture site. There may be extension to the   intratrochanteric region. Follow-up suggested.  Left hip shows no acute fracture. Degenerative changes lower lumbar spine.  Right femur: Right femoral neck fracture with angulation at the fracture   site. There might be some extension into the intratrochanteric region.   Follow-up suggested. Total knee prosthesis incidentally noted. Regional   vascular calcifications present.  Right knee: Total knee prosthesis in near-anatomic alignment. No acute   fracture in the knee region. Right femoral neck region fracture as   discussed above.  Right hip single cross table view. Exam is limited and suboptimal. Right   femoral neck fracture seen on other views presented. Follow-up suggested   as indicated clinically.  CHEST: Some increased interstitial markings in the right infrahilar   region with some elevation of the right hemidiaphragm. Atelectatic change   and/or small effusion should be considered. An early inflammatory   infectious process not excluded. Some atelectatic change at the left base   also noted. Heart is within normal limits in its transthoracic diameter.   Regional osseous structures appropriate for age.        
Orthopedics Post-op Check    POD 0 Right Hip hemiarthroplasty  Pt seen and examined in PACU with son present. Pt somnolent and mildly confused likely 2/2 anesthesia. Pain is controlled. Pt feeling well. No nausea or vomiting.     Vital Signs Last 24 Hrs  T(C): 36.6 (04-02-24 @ 18:30), Max: 36.9 (04-01-24 @ 20:35)  T(F): 97.9 (04-02-24 @ 18:30), Max: 98.5 (04-01-24 @ 20:35)  HR: 67 (04-02-24 @ 18:30) (59 - 74)  BP: 105/57 (04-02-24 @ 18:15) (105/57 - 165/54)  BP(mean): 81 (04-02-24 @ 05:03) (81 - 83)  RR: 14 (04-02-24 @ 18:30) (14 - 17)  SpO2: 100% (04-02-24 @ 18:30) (95% - 100%)      PT/INR - ( 02 Apr 2024 06:26 )   PT: 10.0 sec;   INR: 0.88 ratio         PTT - ( 02 Apr 2024 06:26 )  PTT:30.0 sec                          13.6   11.70 )-----------( 215      ( 02 Apr 2024 17:40 )             41.2     04-02    141  |  110<H>  |  15  ----------------------------<  154<H>  3.8   |  24  |  0.69    Ca    8.6      02 Apr 2024 17:40    TPro  x   /  Alb  3.4  /  TBili  x   /  DBili  x   /  AST  x   /  ALT  x   /  AlkPhos  x   04-02      Exam:  NAD AAOx3  Abduction pillow in place, KI on    Dressing clean and dry  +EHL FHL TA GS  SILT toes 1-5  +DP  Calf Soft NT    A/P:  Stable POD 0 Right hip hemiarthroplasty  -Analgesia  -Ppx ABX  -DVT PE ppx  -OOB PT posterior dislocation precautions, KI on but may remove for PT and skin checks   -Encourage IS  -Dispo planning 
Patient seen and examined at bedside. No acute complaints at this time. Pain well controlled. Denies chest pain, shortness of breath, nausea or vomiting.       PE:  T(C): 37.5 (04-03-24 @ 04:00), Max: 37.5 (04-03-24 @ 04:00)  HR: 85 (04-03-24 @ 04:00) (59 - 91)  BP: 128/57 (04-03-24 @ 04:00) (94/50 - 160/60)  RR: 16 (04-03-24 @ 04:00) (14 - 20)  SpO2: 94% (04-03-24 @ 04:00) (93% - 100%)    General: NAD, resting comfortably in bed  RLE:   Dressing C/D/I  abduction pillow, KI in place  SCDs present bilaterally  Compartments soft and compressible  No calf tenderness bilaterally  +TA/EHL/FHL/GSC  SILT leatha/saph/sp/dp/tib  palpable DP/PT    LABS:                        11.7   10.77 )-----------( 208      ( 03 Apr 2024 06:57 )             35.2     04-02    141  |  110<H>  |  15  ----------------------------<  154<H>  3.8   |  24  |  0.69    Ca    8.6      02 Apr 2024 17:40    TPro  x   /  Alb  3.4  /  TBili  x   /  DBili  x   /  AST  x   /  ALT  x   /  AlkPhos  x   04-02    PT/INR - ( 02 Apr 2024 06:26 )   PT: 10.0 sec;   INR: 0.88 ratio         PTT - ( 02 Apr 2024 06:26 )  PTT:30.0 sec    A/P:  84y F s/p R hip stephani POD 1  -PT/OT   -WBAT  -posterior precautions  -Pain Control  -DVT ppx per primary team, currently ordered for lovenox  -Continue perioperative abx x 24 hours  -FU AM Labs  -Rest, ice, compress the extremity as needed  -Incentive Spirometry  -Medical comanagement appreciated  -dispo pending PT eval
SUBJECTIVE:       Pt seen and examined at bedside. No acute events overnight. Interview limited 2/2 dementia. Pain appears well controlled.      LABS:                        12.6   8.32  )-----------( 223      ( 05 Apr 2024 08:26 )             38.2     04-05    141  |  110<H>  |  14  ----------------------------<  90  3.8   |  27  |  0.61    Ca    8.8      05 Apr 2024 08:26        Urinalysis Basic - ( 05 Apr 2024 08:26 )    Color: x / Appearance: x / SG: x / pH: x  Gluc: 90 mg/dL / Ketone: x  / Bili: x / Urobili: x   Blood: x / Protein: x / Nitrite: x   Leuk Esterase: x / RBC: x / WBC x   Sq Epi: x / Non Sq Epi: x / Bacteria: x        VITAL SIGNS:  T(C): 36.6 (04-06-24 @ 00:00), Max: 36.6 (04-06-24 @ 00:00)  HR: 71 (04-06-24 @ 00:00) (68 - 71)  BP: 146/67 (04-06-24 @ 00:00) (145/57 - 173/64)  RR: 18 (04-06-24 @ 00:00) (16 - 18)  SpO2: 96% (04-06-24 @ 00:00) (94% - 99%)      PE:  General: NAD, resting comfortably in bed  RLE:   Dressing C/D/I  abduction pillow, KI in place  SCDs present bilaterally  Compartments soft and compressible  No calf tenderness bilaterally  +TA/EHL/FHL/GSC  Senstation appears intact leatha/saph/sp/dp/tib; difficulty to asses due to patient mental status  palpable DP/PT    A/P:  84y F s/p R hip stephani POD 4  -PT/OT   -WBAT  -posterior precautions  -Pain Control  -DVT ppx per primary team, currently ordered for lovenox  -FU AM Labs  -Rest, ice  -Incentive Spirometry  -Medical comanagement appreciated  -dispo: [facility], ortho stable for DC planning  -will discuss with Dr. Simon and advise with changes to plan  
SUBJECTIVE:       Pt seen and examined at bedside. No acute events overnight. Patient doing well with no complaints overall. Reports pain well-controlled with medication. No CP, SOB, N/V, F/C, new N/T.    Vital Signs (24 Hrs):  T(C): 36.9 (04-04-24 @ 04:00), Max: 37.7 (04-03-24 @ 16:57)  HR: 72 (04-04-24 @ 04:00) (64 - 83)  BP: 144/66 (04-04-24 @ 04:00) (105/47 - 144/66)  RR: 16 (04-04-24 @ 04:00) (16 - 18)  SpO2: 93% (04-04-24 @ 04:00) (93% - 96%)  Wt(kg): --    LABS:                          11.7   10.77 )-----------( 208      ( 03 Apr 2024 06:57 )             35.2     04-03    141  |  110<H>  |  16  ----------------------------<  116<H>  3.9   |  24  |  0.74    Ca    8.6      03 Apr 2024 06:57    TPro  x   /  Alb  3.4  /  TBili  x   /  DBili  x   /  AST  x   /  ALT  x   /  AlkPhos  x   04-02    LIVER FUNCTIONS - ( 02 Apr 2024 06:26 )  Alb: 3.4 g/dL / Pro: x     / ALK PHOS: x     / ALT: x     / AST: x     / GGT: x           PT/INR - ( 02 Apr 2024 06:26 )   PT: 10.0 sec;   INR: 0.88 ratio         PTT - ( 02 Apr 2024 06:26 )  PTT:30.0 sec    PE:  General: NAD, resting comfortably in bed  RLE:   Dressing C/D/I  abduction pillow, KI in place  SCDs present bilaterally  Compartments soft and compressible  No calf tenderness bilaterally  +TA/EHL/FHL/GSC  SILT leatha/saph/sp/dp/tib  palpable DP/PT    A/P:  84y F s/p R hip stephani POD 2  -PT/OT   -WBAT  -posterior precautions  -Pain Control  -DVT ppx per primary team, currently ordered for lovenox  -FU AM Labs  -Rest, ice, compress the extremity as needed  -Incentive Spirometry  -Medical comanagement appreciated  -dispo [facility]

## 2024-04-08 NOTE — PROGRESS NOTE ADULT - REASON FOR ADMISSION
Right femoral neck fracture.

## 2024-04-08 NOTE — PROGRESS NOTE ADULT - PROVIDER SPECIALTY LIST ADULT
Hospitalist
Hospitalist
Orthopedics
Hospitalist
Orthopedics
Hospitalist
Hospitalist

## 2024-04-08 NOTE — DISCHARGE NOTE NURSING/CASE MANAGEMENT/SOCIAL WORK - NSDCVIVACCINE_GEN_ALL_CORE_FT
Tdap; 15-Mar-2023 18:56; Ayana Gonzales ([employer]); [employer]; [id number] (Exp. Date: 08-Dec-2024); IntraMuscular; Deltoid Left.; 0.5 milliLiter(s); VIS (VIS Published: 09-May-2013, VIS Presented: 15-Mar-2023);    35

## 2024-04-08 NOTE — DISCHARGE NOTE NURSING/CASE MANAGEMENT/SOCIAL WORK - NSDCPEFALRISK_GEN_ALL_CORE
For information on Fall & Injury Prevention, visit: [email or web address] OR  [email or web address] OR  https://www.cdc.gov/steadi/patient.html

## 2024-04-08 NOTE — DISCHARGE NOTE NURSING/CASE MANAGEMENT/SOCIAL WORK - PATIENT PORTAL LINK FT
You can access the FollowMyHealth Patient Portal offered by [facility] by registering at the following website: [email or web address]. By joining [employer]’s FollowMyHealth portal, you will also be able to view your health information using other applications (apps) compatible with our system.

## 2024-04-11 LAB — SURGICAL PATHOLOGY STUDY: SIGNIFICANT CHANGE UP

## 2024-04-14 DIAGNOSIS — E78.5 HYPERLIPIDEMIA, UNSPECIFIED: ICD-10-CM

## 2024-04-14 DIAGNOSIS — T43.215A ADVERSE EFFECT OF SELECTIVE SEROTONIN AND NOREPINEPHRINE REUPTAKE INHIBITORS, INITIAL ENCOUNTER: ICD-10-CM

## 2024-04-14 DIAGNOSIS — G92.8 OTHER TOXIC ENCEPHALOPATHY: ICD-10-CM

## 2024-04-14 DIAGNOSIS — S72.001A FRACTURE OF UNSPECIFIED PART OF NECK OF RIGHT FEMUR, INITIAL ENCOUNTER FOR CLOSED FRACTURE: ICD-10-CM

## 2024-04-14 DIAGNOSIS — F03.93 UNSPECIFIED DEMENTIA, UNSPECIFIED SEVERITY, WITH MOOD DISTURBANCE: ICD-10-CM

## 2024-04-14 DIAGNOSIS — Z96.653 PRESENCE OF ARTIFICIAL KNEE JOINT, BILATERAL: ICD-10-CM

## 2024-04-14 DIAGNOSIS — N39.0 URINARY TRACT INFECTION, SITE NOT SPECIFIED: ICD-10-CM

## 2024-04-14 DIAGNOSIS — F03.94 UNSPECIFIED DEMENTIA, UNSPECIFIED SEVERITY, WITH ANXIETY: ICD-10-CM
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
## 2024-04-14 DIAGNOSIS — I10 ESSENTIAL (PRIMARY) HYPERTENSION: ICD-10-CM
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
## 2024-04-17 ENCOUNTER — TRANSCRIPTION ENCOUNTER (OUTPATIENT)
Age: 84
End: 2024-04-17

## 2024-05-02 ENCOUNTER — TRANSCRIPTION ENCOUNTER (OUTPATIENT)
Age: 84
End: 2024-05-02

## 2024-05-03 PROBLEM — S72.001A FRACTURE OF UNSPECIFIED PART OF NECK OF RIGHT FEMUR, INITIAL ENCOUNTER FOR CLOSED FRACTURE: Chronic | Status: ACTIVE | Noted: 2024-04-02

## 2024-05-03 PROBLEM — F03.90 UNSPECIFIED DEMENTIA WITHOUT BEHAVIORAL DISTURBANCE: Chronic | Status: ACTIVE | Noted: 2024-04-02

## 2024-05-03 PROBLEM — F03.90 UNSPECIFIED DEMENTIA, UNSPECIFIED SEVERITY, WITHOUT BEHAVIORAL DISTURBANCE, PSYCHOTIC DISTURBANCE, MOOD DISTURBANCE, AND ANXIETY: Chronic | Status: ACTIVE | Noted: 2024-04-02

## 2024-05-03 PROBLEM — F41.9 ANXIETY DISORDER, UNSPECIFIED: Chronic | Status: ACTIVE | Noted: 2024-04-02

## 2024-05-06 ENCOUNTER — APPOINTMENT (OUTPATIENT)
Dept: ORTHOPEDIC SURGERY | Facility: CLINIC | Age: 84
End: 2024-05-06
Payer: MEDICARE

## 2024-05-06 DIAGNOSIS — S72.031D DISPLACED MIDCERVICAL FRACTURE OF RIGHT FEMUR, SUBSEQUENT ENCOUNTER FOR CLOSED FRACTURE WITH ROUTINE HEALING: ICD-10-CM

## 2024-05-06 PROCEDURE — 99024 POSTOP FOLLOW-UP VISIT: CPT

## 2024-05-06 PROCEDURE — 73502 X-RAY EXAM HIP UNI 2-3 VIEWS: CPT

## 2024-05-06 NOTE — HISTORY OF PRESENT ILLNESS
[9] : 9 [] : Post Surgical Visit: yes [FreeTextEntry5] : 84 y.o patient is here for PO#1 of her right hip today. DOS noted above. States recovery is going well. PT at rehab everyday. At home PT and OT 3-4x a week. States pain levels remain high.  She is NWB in a wheelchair [de-identified] : 4/2/24 [de-identified] : Right Hip Hemiarthroplasty

## 2024-05-06 NOTE — ASSESSMENT
[FreeTextEntry1] : The patient comes in today for follow-up on her right hip.  She is now 5 weeks out from her hemiarthroplasty of the right hip for femoral neck fracture.  This was done on April 2, 2024.  She is now home with home care.  She is getting physical therapy twice a week.  She walks with a walker.  Examination of the right lower extremity reveals normal neurovascular exam.  Her leg lengths are equal.  She has a well-healed scar with no signs of infection or DVT.  She has good strength of hip flexion and abduction.  X-rays done in the office today the AP pelvis 1 view and the right hip 2 views shows a cemented hemiarthroplasty of the right hip in good position with no signs of loosening or wear.  There is no secondary arthritis.  Her left hip is unaffected and is normal.  There is no signs of arthritis in the left hip.  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  There are no obvious tumors, masses or calcifications seen.  Plan at this time is continue home physical therapy twice a week.  She will maintain her right total hip precautions.  I will see her back in the office in 6 to 8 weeks as needed only.

## 2024-05-22 ENCOUNTER — TRANSCRIPTION ENCOUNTER (OUTPATIENT)
Age: 84
End: 2024-05-22

## 2024-06-06 RX ORDER — NITROFURANTOIN MACROCRYSTAL 50 MG
1 CAPSULE ORAL
Refills: 0 | DISCHARGE
Start: 2024-06-06 | End: 2024-06-12

## 2024-06-10 ENCOUNTER — INPATIENT (INPATIENT)
Facility: HOSPITAL | Age: 84
LOS: 4 days | Discharge: NURSING, REHAB OR LONG-TERM CARE | DRG: 690 | End: 2024-06-15
Attending: HOSPITALIST | Admitting: INTERNAL MEDICINE
Payer: MEDICARE

## 2024-06-10 VITALS
DIASTOLIC BLOOD PRESSURE: 65 MMHG | TEMPERATURE: 99 F | SYSTOLIC BLOOD PRESSURE: 151 MMHG | OXYGEN SATURATION: 97 % | HEART RATE: 72 BPM | RESPIRATION RATE: 18 BRPM

## 2024-06-10 DIAGNOSIS — Z96.653 PRESENCE OF ARTIFICIAL KNEE JOINT, BILATERAL: Chronic | ICD-10-CM

## 2024-06-10 DIAGNOSIS — Z98.890 OTHER SPECIFIED POSTPROCEDURAL STATES: Chronic | ICD-10-CM

## 2024-06-10 PROCEDURE — 99285 EMERGENCY DEPT VISIT HI MDM: CPT

## 2024-06-10 RX ORDER — CEFTRIAXONE 500 MG/1
1000 INJECTION, POWDER, FOR SOLUTION INTRAMUSCULAR; INTRAVENOUS ONCE
Refills: 0 | Status: DISCONTINUED | OUTPATIENT
Start: 2024-06-10 | End: 2024-06-10

## 2024-06-10 RX ORDER — SODIUM CHLORIDE 9 MG/ML
1000 INJECTION, SOLUTION INTRAVENOUS ONCE
Refills: 0 | Status: COMPLETED | OUTPATIENT
Start: 2024-06-10 | End: 2024-06-10

## 2024-06-10 RX ORDER — ACETAMINOPHEN 500 MG
1000 TABLET ORAL ONCE
Refills: 0 | Status: COMPLETED | OUTPATIENT
Start: 2024-06-10 | End: 2024-06-11

## 2024-06-10 RX ORDER — CEFTRIAXONE 500 MG/1
1000 INJECTION, POWDER, FOR SOLUTION INTRAMUSCULAR; INTRAVENOUS ONCE
Refills: 0 | Status: COMPLETED | OUTPATIENT
Start: 2024-06-10 | End: 2024-06-10

## 2024-06-10 NOTE — ED PROVIDER NOTE - CLINICAL SUMMARY MEDICAL DECISION MAKING FREE TEXT BOX
85 y/o with UTI. Will get labs, IV abx, admit for care. 85 y/o with UTI. Will get labs, IV abx, admit for care. Lab results noted.  Signout given to hospitalist for admission Dr. Lyons.

## 2024-06-10 NOTE — ED PROVIDER NOTE - OBJECTIVE STATEMENT
85 y/o F with PMHx of dementia, HLD, HTN presents to the ED c/o some generalized weakness, fever, found to have UTI 2 days ago started on Macrobid, but has been having worsening sx since then. Pt states she is very weak, and is still having fevers despite being on abx. Pt unable to ambulate independently. Pt reports no complaints, but when asked specifically about fevers she states she feels feverish.

## 2024-06-10 NOTE — ED PROVIDER NOTE - PHYSICAL EXAMINATION
Constitutional: NAD AAOx3, frail, lethargic  Eyes: PERRLA EOMI  Head: Normocephalic atraumatic  Mouth: MMM  Cardiac: regular rate   Resp: Lungs CTAB  GI: Abd s/nt/nd  Neuro: CN2-12 intact  Skin: No visible rashes

## 2024-06-10 NOTE — ED PROVIDER NOTE - NS_ ATTENDINGSCRIBEDETAILS _ED_A_ED_FT
6
Miranda Stewart MD: The history, relevant review of systems, past medical and surgical history, medical decision making, and physical examination was documented by the scribe in my presence and I attest to the accuracy of the documentation.

## 2024-06-10 NOTE — ED ADULT TRIAGE NOTE - CHIEF COMPLAINT QUOTE
Pt BIBEMS from home with c/o UTI. As per EMS pt is currently being treated with antibiotic but symptoms have no subsided. PMH hypertension. Pt A+O at triage.

## 2024-06-10 NOTE — ED CLERICAL - NS ED CLERK NOTE PRE-ARRIVAL INFORMATION; ADDITIONAL PRE-ARRIVAL INFORMATION
This patient is enrolled in the comprehensive joint replacement (CJR) program and has active care navigation.  This patient can be followed up by the care navigation team within 24 hours. To arrange close follow-up or to obtain additional clinical information about this patient, please call the contact number above.   Please call the hospitalist for medical consultation ([phone]) PRIOR to admission or observation.  The hospitalist is part of the care team and can assist in acute medical management.   Please call the orthopedic team () for ALL patients who require admission.

## 2024-06-10 NOTE — ED PROVIDER NOTE - NSICDXPASTMEDICALHX_GEN_ALL_CORE_FT
PAST MEDICAL HISTORY:  Anxiety and depression     Dementia     Fracture of femoral neck, right     HLD (hyperlipidemia)     HTN (hypertension)     UTI (urinary tract infection)

## 2024-06-11 DIAGNOSIS — N39.0 URINARY TRACT INFECTION, SITE NOT SPECIFIED: ICD-10-CM

## 2024-06-11 LAB
ALBUMIN SERPL ELPH-MCNC: 3.1 G/DL — LOW (ref 3.3–5)
ALP SERPL-CCNC: 100 U/L — SIGNIFICANT CHANGE UP (ref 40–120)
ALT FLD-CCNC: 50 U/L — SIGNIFICANT CHANGE UP (ref 12–78)
ANION GAP SERPL CALC-SCNC: 4 MMOL/L — LOW (ref 5–17)
APPEARANCE UR: ABNORMAL
APTT BLD: 28.1 SEC — SIGNIFICANT CHANGE UP (ref 24.5–35.6)
AST SERPL-CCNC: 55 U/L — HIGH (ref 15–37)
BACTERIA # UR AUTO: NEGATIVE /HPF — SIGNIFICANT CHANGE UP
BASOPHILS # BLD AUTO: 0.04 K/UL — SIGNIFICANT CHANGE UP (ref 0–0.2)
BASOPHILS NFR BLD AUTO: 0.7 % — SIGNIFICANT CHANGE UP (ref 0–2)
BILIRUB SERPL-MCNC: 0.4 MG/DL — SIGNIFICANT CHANGE UP (ref 0.2–1.2)
BILIRUB UR-MCNC: NEGATIVE — SIGNIFICANT CHANGE UP
BUN SERPL-MCNC: 12 MG/DL — SIGNIFICANT CHANGE UP (ref 7–23)
CALCIUM SERPL-MCNC: 9 MG/DL — SIGNIFICANT CHANGE UP (ref 8.5–10.1)
CAST: 0 /LPF — SIGNIFICANT CHANGE UP (ref 0–4)
CHLORIDE SERPL-SCNC: 105 MMOL/L — SIGNIFICANT CHANGE UP (ref 96–108)
CO2 SERPL-SCNC: 28 MMOL/L — SIGNIFICANT CHANGE UP (ref 22–31)
COLOR SPEC: YELLOW — SIGNIFICANT CHANGE UP
CREAT SERPL-MCNC: 0.8 MG/DL — SIGNIFICANT CHANGE UP (ref 0.5–1.3)
DIFF PNL FLD: NEGATIVE — SIGNIFICANT CHANGE UP
EGFR: 72 ML/MIN/1.73M2 — SIGNIFICANT CHANGE UP
EOSINOPHIL # BLD AUTO: 0.45 K/UL — SIGNIFICANT CHANGE UP (ref 0–0.5)
EOSINOPHIL NFR BLD AUTO: 7.7 % — HIGH (ref 0–6)
GLUCOSE SERPL-MCNC: 111 MG/DL — HIGH (ref 70–99)
GLUCOSE UR QL: NEGATIVE MG/DL — SIGNIFICANT CHANGE UP
HCT VFR BLD CALC: 39.2 % — SIGNIFICANT CHANGE UP (ref 34.5–45)
HGB BLD-MCNC: 13.1 G/DL — SIGNIFICANT CHANGE UP (ref 11.5–15.5)
IMM GRANULOCYTES NFR BLD AUTO: 0.5 % — SIGNIFICANT CHANGE UP (ref 0–0.9)
INR BLD: 0.96 RATIO — SIGNIFICANT CHANGE UP (ref 0.85–1.18)
KETONES UR-MCNC: NEGATIVE MG/DL — SIGNIFICANT CHANGE UP
LACTATE SERPL-SCNC: 1.1 MMOL/L — SIGNIFICANT CHANGE UP (ref 0.7–2)
LEUKOCYTE ESTERASE UR-ACNC: ABNORMAL
LYMPHOCYTES # BLD AUTO: 0.72 K/UL — LOW (ref 1–3.3)
LYMPHOCYTES # BLD AUTO: 12.2 % — LOW (ref 13–44)
MCHC RBC-ENTMCNC: 30.3 PG — SIGNIFICANT CHANGE UP (ref 27–34)
MCHC RBC-ENTMCNC: 33.4 GM/DL — SIGNIFICANT CHANGE UP (ref 32–36)
MCV RBC AUTO: 90.7 FL — SIGNIFICANT CHANGE UP (ref 80–100)
MONOCYTES # BLD AUTO: 0.49 K/UL — SIGNIFICANT CHANGE UP (ref 0–0.9)
MONOCYTES NFR BLD AUTO: 8.3 % — SIGNIFICANT CHANGE UP (ref 2–14)
NEUTROPHILS # BLD AUTO: 4.15 K/UL — SIGNIFICANT CHANGE UP (ref 1.8–7.4)
NEUTROPHILS NFR BLD AUTO: 70.6 % — SIGNIFICANT CHANGE UP (ref 43–77)
NITRITE UR-MCNC: NEGATIVE — SIGNIFICANT CHANGE UP
PH UR: 5 — SIGNIFICANT CHANGE UP (ref 5–8)
PLATELET # BLD AUTO: 207 K/UL — SIGNIFICANT CHANGE UP (ref 150–400)
POTASSIUM SERPL-MCNC: 3.7 MMOL/L — SIGNIFICANT CHANGE UP (ref 3.5–5.3)
POTASSIUM SERPL-SCNC: 3.7 MMOL/L — SIGNIFICANT CHANGE UP (ref 3.5–5.3)
PROT SERPL-MCNC: 6.6 GM/DL — SIGNIFICANT CHANGE UP (ref 6–8.3)
PROT UR-MCNC: NEGATIVE MG/DL — SIGNIFICANT CHANGE UP
PROTHROM AB SERPL-ACNC: 10.9 SEC — SIGNIFICANT CHANGE UP (ref 9.5–13)
RBC # BLD: 4.32 M/UL — SIGNIFICANT CHANGE UP (ref 3.8–5.2)
RBC # FLD: 13.5 % — SIGNIFICANT CHANGE UP (ref 10.3–14.5)
RBC CASTS # UR COMP ASSIST: 1 /HPF — SIGNIFICANT CHANGE UP (ref 0–4)
SODIUM SERPL-SCNC: 137 MMOL/L — SIGNIFICANT CHANGE UP (ref 135–145)
SP GR SPEC: 1.01 — SIGNIFICANT CHANGE UP (ref 1–1.03)
SQUAMOUS # UR AUTO: 1 /HPF — SIGNIFICANT CHANGE UP (ref 0–5)
UROBILINOGEN FLD QL: 0.2 MG/DL — SIGNIFICANT CHANGE UP (ref 0.2–1)
WBC # BLD: 5.88 K/UL — SIGNIFICANT CHANGE UP (ref 3.8–10.5)
WBC # FLD AUTO: 5.88 K/UL — SIGNIFICANT CHANGE UP (ref 3.8–10.5)
WBC UR QL: 104 /HPF — HIGH (ref 0–5)

## 2024-06-11 PROCEDURE — 86480 TB TEST CELL IMMUN MEASURE: CPT

## 2024-06-11 PROCEDURE — 93010 ELECTROCARDIOGRAM REPORT: CPT

## 2024-06-11 PROCEDURE — 36415 COLL VENOUS BLD VENIPUNCTURE: CPT

## 2024-06-11 PROCEDURE — 97116 GAIT TRAINING THERAPY: CPT | Mod: GP

## 2024-06-11 PROCEDURE — 97163 PT EVAL HIGH COMPLEX 45 MIN: CPT | Mod: GP

## 2024-06-11 PROCEDURE — 97110 THERAPEUTIC EXERCISES: CPT | Mod: GP

## 2024-06-11 PROCEDURE — 99222 1ST HOSP IP/OBS MODERATE 55: CPT

## 2024-06-11 RX ORDER — METOPROLOL TARTRATE 50 MG
25 TABLET ORAL DAILY
Refills: 0 | Status: ACTIVE | OUTPATIENT
Start: 2024-06-11 | End: 2025-05-10

## 2024-06-11 RX ORDER — ASCORBIC ACID 60 MG
500 TABLET,CHEWABLE ORAL
Refills: 0 | Status: ACTIVE | OUTPATIENT
Start: 2024-06-11 | End: 2025-05-10

## 2024-06-11 RX ORDER — AMLODIPINE BESYLATE 2.5 MG/1
10 TABLET ORAL DAILY
Refills: 0 | Status: DISCONTINUED | OUTPATIENT
Start: 2024-06-11 | End: 2024-06-11

## 2024-06-11 RX ORDER — SODIUM CHLORIDE 9 MG/ML
1000 INJECTION, SOLUTION INTRAVENOUS
Refills: 0 | Status: ACTIVE | OUTPATIENT
Start: 2024-06-11 | End: 2024-06-12

## 2024-06-11 RX ORDER — ONDANSETRON 8 MG/1
4 TABLET, FILM COATED ORAL EVERY 8 HOURS
Refills: 0 | Status: ACTIVE | OUTPATIENT
Start: 2024-06-11 | End: 2025-05-10

## 2024-06-11 RX ORDER — PANTOPRAZOLE SODIUM 20 MG/1
40 TABLET, DELAYED RELEASE ORAL
Refills: 0 | Status: ACTIVE | OUTPATIENT
Start: 2024-06-11 | End: 2025-05-10

## 2024-06-11 RX ORDER — CEFTRIAXONE 500 MG/1
1000 INJECTION, POWDER, FOR SOLUTION INTRAMUSCULAR; INTRAVENOUS EVERY 24 HOURS
Refills: 0 | Status: COMPLETED | OUTPATIENT
Start: 2024-06-11 | End: 2024-06-14

## 2024-06-11 RX ORDER — ATORVASTATIN CALCIUM 80 MG/1
20 TABLET, FILM COATED ORAL AT BEDTIME
Refills: 0 | Status: ACTIVE | OUTPATIENT
Start: 2024-06-11 | End: 2025-05-10

## 2024-06-11 RX ORDER — CEFTRIAXONE 500 MG/1
1000 INJECTION, POWDER, FOR SOLUTION INTRAMUSCULAR; INTRAVENOUS EVERY 24 HOURS
Refills: 0 | Status: DISCONTINUED | OUTPATIENT
Start: 2024-06-11 | End: 2024-06-11

## 2024-06-11 RX ORDER — ATORVASTATIN CALCIUM 80 MG/1
1 TABLET, FILM COATED ORAL
Refills: 0 | DISCHARGE

## 2024-06-11 RX ORDER — ACETAMINOPHEN 500 MG
650 TABLET ORAL EVERY 6 HOURS
Refills: 0 | Status: ACTIVE | OUTPATIENT
Start: 2024-06-11 | End: 2025-05-10

## 2024-06-11 RX ORDER — FOLIC ACID 0.8 MG
1 TABLET ORAL DAILY
Refills: 0 | Status: ACTIVE | OUTPATIENT
Start: 2024-06-11 | End: 2025-05-10

## 2024-06-11 RX ORDER — LANOLIN ALCOHOL/MO/W.PET/CERES
3 CREAM (GRAM) TOPICAL AT BEDTIME
Refills: 0 | Status: ACTIVE | OUTPATIENT
Start: 2024-06-11 | End: 2025-05-10

## 2024-06-11 RX ORDER — SENNA PLUS 8.6 MG/1
2 TABLET ORAL AT BEDTIME
Refills: 0 | Status: ACTIVE | OUTPATIENT
Start: 2024-06-11 | End: 2025-05-10

## 2024-06-11 RX ORDER — ENOXAPARIN SODIUM 100 MG/ML
40 INJECTION SUBCUTANEOUS EVERY 24 HOURS
Refills: 0 | Status: ACTIVE | OUTPATIENT
Start: 2024-06-11 | End: 2025-05-10

## 2024-06-11 RX ORDER — CEFUROXIME AXETIL 250 MG
1 TABLET ORAL
Qty: 0 | Refills: 0 | DISCHARGE

## 2024-06-11 RX ADMIN — Medication 1 TABLET(S): at 14:19

## 2024-06-11 RX ADMIN — SENNA PLUS 2 TABLET(S): 8.6 TABLET ORAL at 21:20

## 2024-06-11 RX ADMIN — SODIUM CHLORIDE 75 MILLILITER(S): 9 INJECTION, SOLUTION INTRAVENOUS at 10:25

## 2024-06-11 RX ADMIN — Medication 400 MILLIGRAM(S): at 00:37

## 2024-06-11 RX ADMIN — CEFTRIAXONE 1000 MILLIGRAM(S): 500 INJECTION, POWDER, FOR SOLUTION INTRAMUSCULAR; INTRAVENOUS at 00:37

## 2024-06-11 RX ADMIN — AMLODIPINE BESYLATE 10 MILLIGRAM(S): 2.5 TABLET ORAL at 14:18

## 2024-06-11 RX ADMIN — ATORVASTATIN CALCIUM 20 MILLIGRAM(S): 80 TABLET, FILM COATED ORAL at 21:20

## 2024-06-11 RX ADMIN — PANTOPRAZOLE SODIUM 40 MILLIGRAM(S): 20 TABLET, DELAYED RELEASE ORAL at 14:19

## 2024-06-11 RX ADMIN — Medication 1 MILLIGRAM(S): at 14:19

## 2024-06-11 RX ADMIN — ENOXAPARIN SODIUM 40 MILLIGRAM(S): 100 INJECTION SUBCUTANEOUS at 10:24

## 2024-06-11 RX ADMIN — SODIUM CHLORIDE 1000 MILLILITER(S): 9 INJECTION, SOLUTION INTRAVENOUS at 00:37

## 2024-06-11 RX ADMIN — Medication 500 MILLIGRAM(S): at 21:20

## 2024-06-11 NOTE — H&P ADULT - NSHPLABSRESULTS_GEN_ALL_CORE
13.1   5.88  )-----------( 207      ( 10 Herbert 2024 23:55 )             39.2     06-10    137  |  105  |  12  ----------------------------<  111<H>  3.7   |  28  |  0.80    Ca    9.0      10 Herbert 2024 23:55    TPro  6.6  /  Alb  3.1<L>  /  TBili  0.4  /  DBili  x   /  AST  55<H>  /  ALT  50  /  AlkPhos  100  06-10      CAPILLARY BLOOD GLUCOSE          Urinalysis with Rflx Culture (collected 11 Jun 2024 00:44)    Urinalysis with Rflx Culture (06.11.24 @ 00:44)   Urine Appearance: Cloudy  Color: Yellow  Specific Gravity: 1.011  pH Urine: 5.0  Protein, Urine: Negative mg/dL  Glucose Qualitative, Urine: Negative mg/dL  Ketone - Urine: Negative mg/dL  Blood, Urine: Negative  Bilirubin: Negative  Urobilinogen: 0.2 mg/dL  Leukocyte Esterase Concentration: Large  Nitrite: NegativeUrine Microscopic-Add On (NC) (06.11.24 @ 00:44)   Bacteria: Negative /HPF  Epithelial Cells: 1 /HPF  Cast: 0 /LPF  Red Blood Cell - Urine: 1 /HPF  White Blood Cell - Urine: 104 /HPF    RADIOLOGY:      EKG:    < from: 12 Lead ECG (06.11.24 @ 00:47) >      Diagnosis Line Sinus rhythm with 1st degree A-V block  Right bundle branch block  Abnormal ECG  When compared with ECG of 06-APR-2024 14:15,  No significant change was found    < end of copied text >    I personally reviewed labs, imaging, ekg, orders and vitals  Discussed with patient, family, RN, CM/SW and care team 13.1   5.88  )-----------( 207      ( 10 Herbert 2024 23:55 )             39.2     06-10    137  |  105  |  12  ----------------------------<  111<H>  3.7   |  28  |  0.80    Ca    9.0      10 Herbert 2024 23:55    TPro  6.6  /  Alb  3.1<L>  /  TBili  0.4  /  DBili  x   /  AST  55<H>  /  ALT  50  /  AlkPhos  100  06-10      CAPILLARY BLOOD GLUCOSE          Urinalysis with Rflx Culture (collected 11 Jun 2024 00:44)    Urinalysis with Rflx Culture (06.11.24 @ 00:44)   Urine Appearance: Cloudy  Color: Yellow  Specific Gravity: 1.011  pH Urine: 5.0  Protein, Urine: Negative mg/dL  Glucose Qualitative, Urine: Negative mg/dL  Ketone - Urine: Negative mg/dL  Blood, Urine: Negative  Bilirubin: Negative  Urobilinogen: 0.2 mg/dL  Leukocyte Esterase Concentration: Large  Nitrite: NegativeUrine Microscopic-Add On (NC) (06.11.24 @ 00:44)   Bacteria: Negative /HPF  Epithelial Cells: 1 /HPF  Cast: 0 /LPF  Red Blood Cell - Urine: 1 /HPF  White Blood Cell - Urine: 104 /HPF          EKG:    < from: 12 Lead ECG (06.11.24 @ 00:47) >      Diagnosis Line Sinus rhythm with 1st degree A-V block  Right bundle branch block  Abnormal ECG  When compared with ECG of 06-APR-2024 14:15,  No significant change was found    < end of copied text >    I personally reviewed labs, imaging, ekg, orders and vitals  Discussed with patient, family, RN, CM/SW and care team

## 2024-06-11 NOTE — ED ADULT NURSE NOTE - OBJECTIVE STATEMENT
85yo Female co worsening UTI since saturday. As per pt's son, pt was recently diagnosed for a UTI on Saturday and was prescribed macrobid. Pt's son states pt was well until today where pt suddenly felt very weak, still having fevers, unable to ambulate independently, and slight confusion. Pt endorsing generalized body aches and fever but no other complaints at this time.

## 2024-06-11 NOTE — PHYSICAL THERAPY INITIAL EVALUATION ADULT - GAIT DEVIATIONS NOTED, PT EVAL
FORWARD TRUNK LEAN WITH DECREASED BLE HEEL STRIKE, NARROW VICKI AND STEP WIDTH/decreased vicky/decreased step length/decreased stride length

## 2024-06-11 NOTE — PATIENT PROFILE ADULT - FALL HARM RISK - HARM RISK INTERVENTIONS

## 2024-06-11 NOTE — PATIENT PROFILE ADULT - PRO INTERPRETER NEED 2
Pt questioning dose for propanolol.   Bottle states 10 mg - 2 tablets two times a day  Advised pt that is correct dose   had recommended she be on 20 mg BID per 9/12/19 note
English

## 2024-06-11 NOTE — ED ADULT NURSE REASSESSMENT NOTE - NS ED NURSE REASSESS COMMENT FT1
received pt. from previous RN - Walter, pt. is on bed with ongoing LR 1L bolus, awaiting for bed allocation in the lainez, safety maintained applied.

## 2024-06-11 NOTE — H&P ADULT - NSHPPHYSICALEXAM_GEN_ALL_CORE
PHYSICAL EXAM:    T(C): 36.6 (06-11-24 @ 07:42), Max: 37.3 (06-10-24 @ 21:37)  HR: 59 (06-11-24 @ 07:42) (59 - 72)  BP: 152/56 (06-11-24 @ 07:42) (119/52 - 156/65)  RR: 16 (06-11-24 @ 07:42) (16 - 18)  SpO2: 98% (06-11-24 @ 07:42) (97% - 98%)    General: AAOx3; NAD  Head: AT/NC  ENT: Moist Mucous Membranes; No Injury  Eyes: EOMI; PERRL  Neck: Non-tender; No JVD  CVS: RRR, S1&S2, No murmur, No edema  Respiratory: Lungs CTA B/L; Normal Respiratory Effort  Abdomen/GI: Soft, non-tender, non-distended, no guarding, no rebound, normal bowel sounds  : No bladder distention, No [place]berry  Extremities: No cyanosis, No clubbing, No edema  MSK: No CVA tenderness, Normal ROM, No injury  Neuro: AAOx3, CNII-XII grossly intact, non-focal  Psych: Appropriate, Cooperative, No depression, No anxiety  Skin: Clean, Dry and Intact PHYSICAL EXAM:    T(C): 36.6 (06-11-24 @ 07:42), Max: 37.3 (06-10-24 @ 21:37)  HR: 59 (06-11-24 @ 07:42) (59 - 72)  BP: 152/56 (06-11-24 @ 07:42) (119/52 - 156/65)  RR: 16 (06-11-24 @ 07:42) (16 - 18)  SpO2: 98% (06-11-24 @ 07:42) (97% - 98%)    General: AAOx1-2; NAD  Head: AT/NC  ENT: Moist Mucous Membranes; No Injury  Eyes: EOMI; PERRL  Neck: Non-tender; No JVD  CVS: RRR, S1&S2, No murmur, No edema  Respiratory: Lungs CTA B/L; Normal Respiratory Effort  Abdomen/GI: Soft, non-tender, non-distended, no guarding, no rebound, normal bowel sounds  : No bladder distention, No Newberry  Extremities: No cyanosis, No clubbing, No edema  MSK: No CVA tenderness, Normal ROM, No injury  Neuro: AAOx1-2, CNII-XII grossly intact, non-focal  Psych: Appropriate, Cooperative,  Skin: Clean, Dry and Intact

## 2024-06-11 NOTE — H&P ADULT - ASSESSMENT
A/P    Generalized weakness  Dementia  Possibl UTI. No SIRS/Sepsis POA  HTN  HLD  -Admit to medicine  -UA negative for bacteria; But with Large LE and pyuria  -Empiric ABX  -Follow cultures  -IVF as needed for fluid balance  -Home BP Rx. Titrate accordingly  -PT Eval/Tx    DVT Prophylaxis: Lovenox subq     A/P    Generalized weakness  Dementia  Possibl UTI. No SIRS/Sepsis POA  HTN  HLD  -Admit to medicine  -UA negative for bacteria; But with Large LE and pyuria  -Empiric ABX  -Follow cultures  -IVF as needed for fluid balance  -Home BP Rx. Titrate accordingly  -PT Eval/Tx    DVT Prophylaxis: Lovenox subq

## 2024-06-11 NOTE — PATIENT PROFILE ADULT - FUNCTIONAL ASSESSMENT - BASIC MOBILITY 6.
3-calculated by average/Not able to assess (calculate score using [facility] averaging method)

## 2024-06-11 NOTE — PATIENT PROFILE ADULT - NSPROPTRIGHTBILLOFRIGHTS_GEN_A_NUR
[facility]- Pediatric Dermatology  Dr. Melly Fuller, Dr. Evelyn Zepeda, Dr. Yudy Laguna, VIJAY Maynard Dr[address] Dr. Rosie Herrera & Dr. Kodi Luther       Non Urgent  Nurse Triage Line; [phone]- Mehreen and Diamond COOPER Care Coordinators      Tatiana (/Complex ) [phone]      If you need a prescription refill, please contact your pharmacy. Refills are approved or denied by our Physicians during normal business hours, Monday through Fridays    Per office policy, refills will not be granted if you have not been seen within the past year (or sooner depending on your child's condition)      Scheduling Information:     Pediatric Appointment Scheduling and Call Center [phone]   Radiology Scheduling- [phone]     Sedation Unit Scheduling- [phone]    [place] Scheduling- [facility] [phone]; Pediatric Dermatology [phone]    Main  Services: [phone]   Swedish: [phone]   Nigerian: [phone]   Hmong/Danish/Chinese: [phone]      Preadmission Nursing Department Fax Number: [phone] (Fax all pre-operative paperwork to this number)      For urgent matters arising during evenings, weekends, or holidays that cannot wait for normal business hours please call [phone] and ask for the Dermatology Resident On-Call to be paged.          Start imiquimod 5% cream every other night to the warts . Wash off after 8 hours. Discussed that we may experience irritation from this medication. Recommend the patient wash hands after use or use gloves to apply. Keep medication away from pets.  Do not occlude treated area with bandages. Discussed this may take 3-4 months to see improvement. We can plan to do cryotherapy (freezing in the office)      WARTS ( Verruca Vulgaris )    What are Warts?    Warts are common viral infections caused by the human papilloma virus (HPV). There are many different strains of this  virus causing different types of warts and specific tests are usually not necessary.    Warts are much more common in children than adults.     Warts can go away without treatment as our own immune system learns how to fight them. About 60% of warts will disappear within about 2 years.    There are many possible ways to treat warts and sometimes several different treatments are needed to get the warts to go away completely. There is no single perfect treatment for warts, and successful treatment can take many months.  Your health care professional will help you find the right treatment tailored to your individual needs.    For in-office treatments, multiple visits are usually required.    COMMON  IN-OFFICE  WART TREATMENTS    Cryotherapy.  This is a cold spray (usually liquid nitrogen) used to freeze the wart.  It may cause a blister.    Candida ( yeast ) antigen injections. These are extracts of the common yeast (Candida) that cannot cause an infection. The medication is injected into/under the wart. It is thought to stimulate the immune system to recognize the wart virus and attack it. Multiple injections are needed about one month apart.    Paring.  Scraping or filing down a wart can help make other wart treatments more effective.    Other less common office treatments include laser treatment and contact immunotherapy (DPCP, squaric acid).     COMMON  AT-HOME  WART TREATMENTS    Over-the-counter wart treatment: Salicylic acid liquid, pads or tape (e.g., Dr. Ryne faye, Compound W, Duofilm, Mediplast)    Soak the warts in warm water for 5 minutes every night.    Gently file the surface of thick warts with a nail file or pumice stone used only for this purpose. Remember, warts are a virus that can be spread.    Apply the wart medicine directly to the warts, avoiding the normal skin (applying petroleum jelly to surrounding skin can help protect it).     Cover the wart medicine/pad/tape with duct tape. If using liquid  salicylic acid, make sure it dries completely before applying the duct tape.    Leave the tape in place at least overnight or, if possible, for 24 hours.    Repeat these steps nightly until the wart is gone (which can take 2-4 months).     Expect the skin of the wart to appear moist and white during treatment.  If the skin becomes too irritated, take a treatment break.     Do not use this medicine on the face or groin area unless instructed to do so by your physician.    Prescription treatments    Retinoids (adapalene, tretinoin, tazarotene), 5-fluorouracil (Efudex) or imiquimod (Aldara) creams are sometimes used to treat flat warts or warts on the face and other sensitive anatomical areas. They are usually applied directly to the warts once a day for 2-4 months and can be irritating.  These treatments should only be used as directed by your health care provider.     Compounded  wart formulations containing agents such as salicylic acid, cantharidin, 5-fluorouracil and other agents may be used to treat warts.  These products can be irritating and may not be covered by insurance. Generally they should not be used on the face or groin area, and they should only be used as directed by your health care provider.     Systemic treatment with oral cimetidine (Tagamet) may help boost the immune system against the wart virus in patients, some of the time.  Initiation of cimetidine therapy should ONLY be done under the supervision of your health care provider, who can discuss possible side effects and drug-to-drug interactions of this specific treatment.     Contributing SPD members:  Mónica Sorensen MD, Jodi Kiran MD,  M. Remigio Briones, Msc (Derm), [facility] (), Brando Carlson MD, Yanci Chung MD, & Imelda Faria MD    Committee Reviewers:  Reuben Escobar MD & Luci Smith MD    Expert Reviewer:   Renae Roa MD    The Society for Pediatric Dermatology and [employer] cannot be held responsible  for any errors or for any consequences arising from the use of the information contained in this handout.   Handout originally published in Pediatric Dermatology: Vol. 32, No. 3 (2015).       The Society for Pediatric Dermatology and [employer] cannot be held responsible for any errors or for any consequences arising from the use of the information contained in this handout.   Handout originally published in Pediatric Dermatology: Vol. 32, No. 3 (2015).        patient

## 2024-06-11 NOTE — PHYSICAL THERAPY INITIAL EVALUATION ADULT - LIGHT TOUCH SENSATION, LLE, REHAB EVAL
"Anesthesia Transfer of Care Note    Patient: Paty Parham    Procedure(s) Performed: Procedure(s) (LRB):  [id number] (N/A)    Patient location: PACU    Anesthesia Type: general    Transport from OR: Transported from OR on room air with adequate spontaneous ventilation    Post pain: adequate analgesia    Post assessment: no apparent anesthetic complications and tolerated procedure well    Post vital signs: stable    Level of consciousness: sedated    Nausea/Vomiting: no nausea/vomiting    Complications: none    Transfer of care protocol was followed      Last vitals:   Visit Vitals  /75 (BP Location: Left arm, Patient Position: Lying)   Pulse 84   Temp 36.8 °C (98.2 °F) (Temporal)   Resp 18   Ht 5' 3" (1.6 m)   Wt 84 kg (185 lb 3 oz)   LMP 11/28/2017   SpO2 97%   Breastfeeding? No   BMI 32.80 kg/m²     " within normal limits

## 2024-06-11 NOTE — PHYSICAL THERAPY INITIAL EVALUATION ADULT - ADDITIONAL COMMENTS
Unable to obtain accurate PLOF due to patient's dementia. Patient claims she lives at home with her son and there is a HHA everyday.

## 2024-06-11 NOTE — H&P ADULT - HISTORY OF PRESENT ILLNESS
84F with PMH of Dementia, HTN, HLD, JIMMY presents with generalized weakness.    In the ER Tmax 99.1F, HR 61-72, /65, RR 16-18, Spo2 97% on RA. CBC and coags unremarkable. BUN 12, Cr 0.8 (baseline 0.5-0.7), UA nondiagnosis negative for bacteria but positive for large LE ad pyuria (), AST 55, Lactate 1.1; EKG with Sinus rythym with RBBB and 1st degree AV block 84F with PMH of Dementia, HTN, HLD, JIMMY presents with generalized weakness. Patient oriented to place and partially to person (unaware of age) and not aware of circumstances of hospitalization except knew she had a fever. Limited ROS/CC from baseline. Patient on presentation statues fevers and chills have resolved. Reports generalized weakness. Denies dizziness, chest pain, pain in general, nausea, vomiting, abdominal pain/discomfort.     In the ER Tmax 99.1F, HR 61-72, /65, RR 16-18, Spo2 97% on RA. CBC and coags unremarkable. BUN 12, Cr 0.8 (baseline 0.5-0.7), UA nondiagnosis negative for bacteria but positive for large LE ad pyuria (), AST 55, Lactate 1.1; EKG with Sinus rythym with RBBB and 1st degree AV block

## 2024-06-11 NOTE — H&P ADULT - NSICDXFAMILYHX_GEN_ALL_CORE_FT
FAMILY HISTORY:  Sibling  Still living? Unknown  Family history of hypertension, Age at diagnosis: Age Unknown     Never

## 2024-06-11 NOTE — H&P ADULT - NSHPLANGLIMITEDENGLISH_GEN_A_CORE
Education provided the Patient on the following:    - Nothing to Eat or Drink after MN the night before the procedure    - Avoid red/purple fluids while completing their bowel prep as ordered by physician  -Contact Gastrointerologist office for any questions about specific details regarding colon prep    -You will need to have someone drive you home after your colonoscopy and remain with you for 24 hours after the procedure  - The date of your Surgery, you may have one visitor at bedside or within 10-15 minutes of [facility]  -Please wear warm socks when you arrive for your colonoscopy  -Remove all jewelry and leave any valuables before arriving the day of your procedure (all will have to be removed before leaving preop)  -You will need to arrive at 0930 on 5-20-24 for your colonoscopy    -Feel free to contact us at: [phone] with any additional questions/concerns      No

## 2024-06-11 NOTE — PHYSICAL THERAPY INITIAL EVALUATION ADULT - IMPAIRMENTS FOUND, PT EVAL
arousal, attention, and cognition/cognitive impairment/ergonomics and body mechanics/gait, locomotion, and balance/muscle strength/posture

## 2024-06-12 LAB
CULTURE RESULTS: SIGNIFICANT CHANGE UP
SPECIMEN SOURCE: SIGNIFICANT CHANGE UP

## 2024-06-12 PROCEDURE — 99233 SBSQ HOSP IP/OBS HIGH 50: CPT

## 2024-06-12 RX ORDER — TUBERCULIN PURIFIED PROTEIN DERIVATIVE 5 [IU]/.1ML
5 INJECTION, SOLUTION INTRADERMAL ONCE
Refills: 0 | Status: COMPLETED | OUTPATIENT
Start: 2024-06-12 | End: 2024-06-12

## 2024-06-12 RX ADMIN — SODIUM CHLORIDE 75 MILLILITER(S): 9 INJECTION, SOLUTION INTRAVENOUS at 00:30

## 2024-06-12 RX ADMIN — Medication 500 MILLIGRAM(S): at 10:50

## 2024-06-12 RX ADMIN — Medication 1 TABLET(S): at 10:49

## 2024-06-12 RX ADMIN — SENNA PLUS 2 TABLET(S): 8.6 TABLET ORAL at 22:25

## 2024-06-12 RX ADMIN — TUBERCULIN PURIFIED PROTEIN DERIVATIVE 5 UNIT(S): 5 INJECTION, SOLUTION INTRADERMAL at 10:49

## 2024-06-12 RX ADMIN — Medication 500 MILLIGRAM(S): at 22:25

## 2024-06-12 RX ADMIN — ATORVASTATIN CALCIUM 20 MILLIGRAM(S): 80 TABLET, FILM COATED ORAL at 22:25

## 2024-06-12 RX ADMIN — ENOXAPARIN SODIUM 40 MILLIGRAM(S): 100 INJECTION SUBCUTANEOUS at 10:50

## 2024-06-12 RX ADMIN — Medication 1 MILLIGRAM(S): at 10:50

## 2024-06-12 RX ADMIN — PANTOPRAZOLE SODIUM 40 MILLIGRAM(S): 20 TABLET, DELAYED RELEASE ORAL at 06:02

## 2024-06-12 RX ADMIN — CEFTRIAXONE 1000 MILLIGRAM(S): 500 INJECTION, POWDER, FOR SOLUTION INTRAMUSCULAR; INTRAVENOUS at 00:24

## 2024-06-13 ENCOUNTER — TRANSCRIPTION ENCOUNTER (OUTPATIENT)
Age: 84
End: 2024-06-13

## 2024-06-13 PROCEDURE — 99233 SBSQ HOSP IP/OBS HIGH 50: CPT

## 2024-06-13 RX ORDER — PANTOPRAZOLE SODIUM 20 MG/1
1 TABLET, DELAYED RELEASE ORAL
Qty: 0 | Refills: 0 | DISCHARGE
Start: 2024-06-13

## 2024-06-13 RX ORDER — FOLIC ACID 0.8 MG
1 TABLET ORAL
Qty: 0 | Refills: 0 | DISCHARGE
Start: 2024-06-13

## 2024-06-13 RX ADMIN — Medication 1 MILLIGRAM(S): at 09:26

## 2024-06-13 RX ADMIN — Medication 25 MILLIGRAM(S): at 09:26

## 2024-06-13 RX ADMIN — Medication 500 MILLIGRAM(S): at 09:26

## 2024-06-13 RX ADMIN — Medication 1 TABLET(S): at 09:26

## 2024-06-13 RX ADMIN — PANTOPRAZOLE SODIUM 40 MILLIGRAM(S): 20 TABLET, DELAYED RELEASE ORAL at 05:48

## 2024-06-13 RX ADMIN — ENOXAPARIN SODIUM 40 MILLIGRAM(S): 100 INJECTION SUBCUTANEOUS at 09:27

## 2024-06-13 RX ADMIN — CEFTRIAXONE 1000 MILLIGRAM(S): 500 INJECTION, POWDER, FOR SOLUTION INTRAMUSCULAR; INTRAVENOUS at 00:23

## 2024-06-13 RX ADMIN — Medication 500 MILLIGRAM(S): at 23:59

## 2024-06-13 NOTE — DISCHARGE NOTE PROVIDER - CARE PROVIDER_API CALL
Ivon Larkin Jessi  [address], [address]  [place] [address]  Phone: [phone]  Fax: [phone]  Follow Up Time: 1 week

## 2024-06-13 NOTE — DISCHARGE NOTE PROVIDER - CARE PROVIDERS DIRECT ADDRESSES
[email or web address]

## 2024-06-13 NOTE — DISCHARGE NOTE PROVIDER - HOSPITAL COURSE
84F with PMH of Dementia, HTN, HLD, JIMMY presents with generalized weakness. Patient oriented to place and partially to person (unaware of age) and not aware of circumstances of hospitalization except knew she had a fever.     #Diagnosed with uti and was treated with abx. At this point she is stable and is being discharged     Vital Signs Last 24 Hrs  T(C): 36.7 (13 Jun 2024 08:17), Max: 36.8 (12 Jun 2024 22:23)  T(F): 98.1 (13 Jun 2024 08:17), Max: 98.2 (12 Jun 2024 22:23)  HR: 73 (13 Jun 2024 08:17) (72 - 73)  BP: 136/68 (13 Jun 2024 08:17) (124/76 - 136/68)  BP(mean): --  RR: 17 (13 Jun 2024 08:17) (17 - 18)  SpO2: 93% (13 Jun 2024 08:17) (93% - 96%)    Parameters below as of 13 Jun 2024 08:17  Patient On (Oxygen Delivery Method): room air        General: comfortable   Head- Atraumatic, normocephalic   Neurology: Alert, follows command   HEENT- PERRLA, moist mucous membrane  Neck-supple, no JVD  Respiratory: Air entry equal b/l, CTA   CVS:  S1S2, no murmurs, rubs or gallops  Abdominal: Soft, NT, ND +BS,   Genitourinary- voiding, non palpable bladder  Extremities: No edema, + peripheral pulses  Skin- no rash, no ulcer  Psychiatric- mood stable   LN- no lymphadenopathy         06-13-24 @ 11:05          Pt has been managed here symptomatically, currently hemodynamically stable and is being discharged with further management as an outpt, time spent 45 minutes

## 2024-06-14 ENCOUNTER — TRANSCRIPTION ENCOUNTER (OUTPATIENT)
Age: 84
End: 2024-06-14

## 2024-06-14 PROCEDURE — 99233 SBSQ HOSP IP/OBS HIGH 50: CPT

## 2024-06-14 RX ADMIN — ATORVASTATIN CALCIUM 20 MILLIGRAM(S): 80 TABLET, FILM COATED ORAL at 00:00

## 2024-06-14 RX ADMIN — PANTOPRAZOLE SODIUM 40 MILLIGRAM(S): 20 TABLET, DELAYED RELEASE ORAL at 06:36

## 2024-06-14 RX ADMIN — Medication 25 MILLIGRAM(S): at 10:46

## 2024-06-14 RX ADMIN — SENNA PLUS 2 TABLET(S): 8.6 TABLET ORAL at 00:00

## 2024-06-14 RX ADMIN — Medication 500 MILLIGRAM(S): at 22:10

## 2024-06-14 RX ADMIN — Medication 1 MILLIGRAM(S): at 10:46

## 2024-06-14 RX ADMIN — CEFTRIAXONE 1000 MILLIGRAM(S): 500 INJECTION, POWDER, FOR SOLUTION INTRAMUSCULAR; INTRAVENOUS at 01:08

## 2024-06-14 RX ADMIN — SENNA PLUS 2 TABLET(S): 8.6 TABLET ORAL at 22:09

## 2024-06-14 RX ADMIN — ATORVASTATIN CALCIUM 20 MILLIGRAM(S): 80 TABLET, FILM COATED ORAL at 22:09

## 2024-06-14 RX ADMIN — TUBERCULIN PURIFIED PROTEIN DERIVATIVE 5 UNIT(S): 5 INJECTION, SOLUTION INTRADERMAL at 14:11

## 2024-06-14 RX ADMIN — ENOXAPARIN SODIUM 40 MILLIGRAM(S): 100 INJECTION SUBCUTANEOUS at 10:46

## 2024-06-14 RX ADMIN — Medication 500 MILLIGRAM(S): at 10:44

## 2024-06-14 RX ADMIN — Medication 1 TABLET(S): at 10:46

## 2024-06-14 NOTE — DISCHARGE NOTE NURSING/CASE MANAGEMENT/SOCIAL WORK - NSDCVIVACCINE_GEN_ALL_CORE_FT
Tdap; 15-Mar-2023 18:56; Ayana Gonzales (YESICA); [employer]; [id number] (Exp. Date: 08-Dec-2024); IntraMuscular; Deltoid Left.; 0.5 milliLiter(s); VIS (VIS Published: 09-May-2013, VIS Presented: 15-Mar-2023);

## 2024-06-14 NOTE — DISCHARGE NOTE NURSING/CASE MANAGEMENT/SOCIAL WORK - PATIENT PORTAL LINK FT
You can access the FollowMyHealth Patient Portal offered by [facility] by registering at the following website: [email or web address]. By joining [employer]’s FollowMyHealth portal, you will also be able to view your health information using other applications (apps) compatible with our system.

## 2024-06-14 NOTE — PROGRESS NOTE ADULT - ASSESSMENT
A/P    #Generalized weakness  -supportive care   -PT    #Dementia-supportive care     #Possibl UTI. No SIRS/Sepsis POA-abx     #HTN-metoprolol    #HLD- Atorvastatin       #dvt pr     #d/c today if no social issue, time spent 45 minutes 
  A/P    #Generalized weakness  -supportive care   -PT    #Dementia-supportive care     #Possibl UTI. No SIRS/Sepsis POA-abx     #HTN-metoprolol    #HLD- Atorvastatin       #dvt pr

## 2024-06-14 NOTE — PROGRESS NOTE ADULT - SUBJECTIVE AND OBJECTIVE BOX
84F with PMH of Dementia, HTN, HLD, JIMMY presents with generalized weakness.     Review of Systems:  Unable to obtain     PHYSICAL EXAM:    Vital Signs Last 24 Hrs  T(C): 36.4 (2024 15:56), Max: 37.5 (2024 21:17)  T(F): 97.5 (2024 15:56), Max: 99.5 (2024 21:17)  HR: 73 (2024 15:56) (70 - 78)  BP: 124/76 (2024 15:56) (115/61 - 125/59)  BP(mean): --  RR: 18 (2024 15:56) (17 - 18)  SpO2: 96% (2024 15:56) (94% - 96%)    Parameters below as of 2024 15:56  Patient On (Oxygen Delivery Method): room air        GENERAL: comfortable   HEAD:  Atraumatic, Normocephalic  EYES: EOMI, PERRLA, conjunctiva and sclera clear  HEENT: Moist mucous membranes  NECK: Supple, No JVD  NERVOUS SYSTEM:  Alert & Oriented X3, Motor Strength 5/5 B/L upper and lower extremities; DTRs 2+ intact and symmetric  CHEST/LUNG: Clear to auscultation bilaterally; No rales, rhonchi, wheezing, or rubs  HEART:S1S2 normal, no murmer  ABDOMEN: Soft, Nontender, Nondistended; Bowel sounds present  GENITOURINARY- Voiding, no palpable bladder  EXTREMITIES:  2+ Peripheral Pulses, No clubbing, cyanosis, or edema  MUSCULOSKELTAL- No muscle tenderness, Muscle tone normal, No joint tenderness, no Joint swelling, Joint range of motion-normal  SKIN-no rash, no lesion  PSYCH- Mood stable  LYMPH Node- No palpable lymph node    LABS:                        13.1   5.88  )-----------( 207      ( 10 Herbert 2024 23:55 )             39.2     06-10    137  |  105  |  12  ----------------------------<  111<H>  3.7   |  28  |  0.80    Ca    9.0      10 Herbert 2024 23:55    TPro  6.6  /  Alb  3.1<L>  /  TBili  0.4  /  DBili  x   /  AST  55<H>  /  ALT  50  /  AlkPhos  100  06-10    PT/INR - ( 2024 01:07 )   PT: 10.9 sec;   INR: 0.96 ratio         PTT - ( 2024 01:07 )  PTT:28.1 sec  Urinalysis Basic - ( 2024 00:44 )    Color: Yellow / Appearance: Cloudy / S.011 / pH: x  Gluc: x / Ketone: Negative mg/dL  / Bili: Negative / Urobili: 0.2 mg/dL   Blood: x / Protein: Negative mg/dL / Nitrite: Negative   Leuk Esterase: Large / RBC: 1 /HPF /  /HPF   Sq Epi: x / Non Sq Epi: 1 /HPF / Bacteria: Negative /HPF        CAPILLARY BLOOD GLUCOSE                Standing medicine  acetaminophen     Tablet .. 650 milliGRAM(s) Oral every 6 hours PRN  aluminum hydroxide/magnesium hydroxide/simethicone Suspension 30 milliLiter(s) Oral every 4 hours PRN  ascorbic acid 500 milliGRAM(s) Oral two times a day  atorvastatin 20 milliGRAM(s) Oral at bedtime  cefTRIAXone Injectable. 1000 milliGRAM(s) IV Push every 24 hours  enoxaparin Injectable 40 milliGRAM(s) SubCutaneous every 24 hours  folic acid 1 milliGRAM(s) Oral daily  lactated ringers. 1000 milliLiter(s) IV Continuous <Continuous>  melatonin 3 milliGRAM(s) Oral at bedtime PRN  metoprolol succinate ER 25 milliGRAM(s) Oral daily  multivitamin 1 Tablet(s) Oral daily  ondansetron Injectable 4 milliGRAM(s) IV Push every 8 hours PRN  pantoprazole    Tablet 40 milliGRAM(s) Oral before breakfast  senna 2 Tablet(s) Oral at bedtime        
  84F with PMH of Dementia, HTN, HLD, JIMMY presents with generalized weakness.     Review of Systems:  Unable to obtain     PHYSICAL EXAM:    Vital Signs Last 24 Hrs  T(C): 36.7 (14 Jun 2024 08:33), Max: 36.8 (13 Jun 2024 15:15)  T(F): 98.1 (14 Jun 2024 08:33), Max: 98.2 (13 Jun 2024 15:15)  HR: 68 (14 Jun 2024 08:33) (68 - 70)  BP: 140/62 (14 Jun 2024 08:33) (106/58 - 145/58)  BP(mean): 73 (13 Jun 2024 15:15) (73 - 73)  RR: 18 (14 Jun 2024 08:33) (18 - 18)  SpO2: 93% (14 Jun 2024 08:33) (93% - 96%)    Parameters below as of 14 Jun 2024 08:33  Patient On (Oxygen Delivery Method): room air          GENERAL: comfortable   HEAD:  Atraumatic, Normocephalic  EYES: EOMI, PERRLA, conjunctiva and sclera clear  HEENT: Moist mucous membranes  NECK: Supple, No JVD  NERVOUS SYSTEM:  Alert  CHEST/LUNG: Clear to auscultation bilaterally; No rales, rhonchi, wheezing, or rubs  HEART:S1S2 normal, no murmur  ABDOMEN: Soft, Nontender, Nondistended; Bowel sounds present  GENITOURINARY- Voiding, no palpable bladder  EXTREMITIES:  2+ Peripheral Pulses, No clubbing, cyanosis, or edema  MUSCULOSKELETAL No muscle tenderness, Muscle tone normal, No joint tenderness, no Joint swelling, Joint range of motion-normal  SKIN-no rash, no lesion  PSYCH- Mood stable  LYMPH Node- No palpable lymph node                        CAPILLARY BLOOD GLUCOSE          MEDICATIONS  (STANDING):  ascorbic acid 500 milliGRAM(s) Oral two times a day  atorvastatin 20 milliGRAM(s) Oral at bedtime  enoxaparin Injectable 40 milliGRAM(s) SubCutaneous every 24 hours  folic acid 1 milliGRAM(s) Oral daily  lactated ringers. 1000 milliLiter(s) (75 mL/Hr) IV Continuous <Continuous>  metoprolol succinate ER 25 milliGRAM(s) Oral daily  multivitamin 1 Tablet(s) Oral daily  pantoprazole    Tablet 40 milliGRAM(s) Oral before breakfast  senna 2 Tablet(s) Oral at bedtime    MEDICATIONS  (PRN):  acetaminophen     Tablet .. 650 milliGRAM(s) Oral every 6 hours PRN Temp greater or equal to 38C (100.4F), Mild Pain (1 - 3)  aluminum hydroxide/magnesium hydroxide/simethicone Suspension 30 milliLiter(s) Oral every 4 hours PRN Dyspepsia  melatonin 3 milliGRAM(s) Oral at bedtime PRN Insomnia  ondansetron Injectable 4 milliGRAM(s) IV Push every 8 hours PRN Nausea and/or Vomiting  
[employer]

## 2024-06-15 VITALS
DIASTOLIC BLOOD PRESSURE: 62 MMHG | OXYGEN SATURATION: 92 % | TEMPERATURE: 98 F | HEART RATE: 75 BPM | SYSTOLIC BLOOD PRESSURE: 148 MMHG | RESPIRATION RATE: 18 BRPM

## 2024-06-15 LAB
GAMMA INTERFERON BACKGROUND BLD IA-ACNC: 0.04 IU/ML — SIGNIFICANT CHANGE UP
M TB IFN-G BLD-IMP: NEGATIVE — SIGNIFICANT CHANGE UP
M TB IFN-G CD4+ BCKGRND COR BLD-ACNC: 0.01 IU/ML — SIGNIFICANT CHANGE UP
M TB IFN-G CD4+CD8+ BCKGRND COR BLD-ACNC: 0 IU/ML — SIGNIFICANT CHANGE UP
QUANT TB PLUS MITOGEN MINUS NIL: 1.48 IU/ML — SIGNIFICANT CHANGE UP

## 2024-06-15 PROCEDURE — 99239 HOSP IP/OBS DSCHRG MGMT >30: CPT

## 2024-06-15 RX ADMIN — PANTOPRAZOLE SODIUM 40 MILLIGRAM(S): 20 TABLET, DELAYED RELEASE ORAL at 06:17

## 2024-06-15 RX ADMIN — Medication 500 MILLIGRAM(S): at 09:27

## 2024-06-15 RX ADMIN — Medication 1 MILLIGRAM(S): at 09:26

## 2024-06-15 RX ADMIN — Medication 25 MILLIGRAM(S): at 09:27

## 2024-06-15 RX ADMIN — Medication 1 TABLET(S): at 09:26

## 2024-06-15 RX ADMIN — ENOXAPARIN SODIUM 40 MILLIGRAM(S): 100 INJECTION SUBCUTANEOUS at 09:27

## 2024-06-16 LAB
CULTURE RESULTS: SIGNIFICANT CHANGE UP
CULTURE RESULTS: SIGNIFICANT CHANGE UP
SPECIMEN SOURCE: SIGNIFICANT CHANGE UP
SPECIMEN SOURCE: SIGNIFICANT CHANGE UP

## 2024-06-19 NOTE — ED PROVIDER NOTE - NSCAREINITIATED _GEN_ER
[FreeTextEntry1] : Impression - chronic kidney disease progressing to ESRD, not on HD yet   Plan LUE precautions - no BP, IV or blood draw (bracelet provided) Continue to follow up with nephrologist Dr. Chowdary. Return to office in 2-3 weeks to evaluate left avf maturity with dialysis ultrasound [Arterial/Venous Disease] : arterial/venous disease Fahad Lynch(Attending)

## 2024-06-20 DIAGNOSIS — D50.9 IRON DEFICIENCY ANEMIA, UNSPECIFIED: ICD-10-CM

## 2024-06-20 DIAGNOSIS — I10 ESSENTIAL (PRIMARY) HYPERTENSION: ICD-10-CM

## 2024-06-20 DIAGNOSIS — F03.93 UNSPECIFIED DEMENTIA, UNSPECIFIED SEVERITY, WITH MOOD DISTURBANCE: ICD-10-CM

## 2024-06-20 DIAGNOSIS — N39.0 URINARY TRACT INFECTION, SITE NOT SPECIFIED: ICD-10-CM

## 2024-06-20 DIAGNOSIS — Z96.653 PRESENCE OF ARTIFICIAL KNEE JOINT, BILATERAL: ICD-10-CM

## 2024-06-20 DIAGNOSIS — E78.5 HYPERLIPIDEMIA, UNSPECIFIED: ICD-10-CM

## 2024-06-20 DIAGNOSIS — F03.90 UNSPECIFIED DEMENTIA WITHOUT BEHAVIORAL DISTURBANCE: ICD-10-CM

## 2024-06-20 DIAGNOSIS — Z79.01 LONG TERM (CURRENT) USE OF ANTICOAGULANTS: ICD-10-CM

## 2024-06-20 DIAGNOSIS — K59.00 CONSTIPATION, UNSPECIFIED: ICD-10-CM

## 2024-06-20 DIAGNOSIS — Z82.49 FAMILY HISTORY OF ISCHEMIC HEART DISEASE AND OTHER DISEASES OF THE CIRCULATORY SYSTEM: ICD-10-CM

## 2024-06-20 DIAGNOSIS — R27.0 ATAXIA, UNSPECIFIED: ICD-10-CM

## 2024-08-14 ENCOUNTER — RESULT REVIEW (OUTPATIENT)
Age: 84
End: 2024-08-14

## 2024-08-15 ENCOUNTER — TRANSCRIPTION ENCOUNTER (OUTPATIENT)
Age: 84
End: 2024-08-15

## 2024-12-20 ENCOUNTER — INPATIENT (INPATIENT)
Facility: HOSPITAL | Age: 84
LOS: 6 days | Discharge: NURSING, REHAB OR LONG-TERM CARE | DRG: 690 | End: 2024-12-27
Attending: INTERNAL MEDICINE | Admitting: HOSPITALIST
Payer: MEDICARE

## 2024-12-20 VITALS
HEIGHT: 62 IN | RESPIRATION RATE: 15 BRPM | TEMPERATURE: 98 F | OXYGEN SATURATION: 99 % | HEART RATE: 63 BPM | DIASTOLIC BLOOD PRESSURE: 64 MMHG | SYSTOLIC BLOOD PRESSURE: 127 MMHG | WEIGHT: 140.65 LBS

## 2024-12-20 DIAGNOSIS — Z98.890 OTHER SPECIFIED POSTPROCEDURAL STATES: Chronic | ICD-10-CM

## 2024-12-20 DIAGNOSIS — N39.0 URINARY TRACT INFECTION, SITE NOT SPECIFIED: ICD-10-CM

## 2024-12-20 DIAGNOSIS — Z96.653 PRESENCE OF ARTIFICIAL KNEE JOINT, BILATERAL: Chronic | ICD-10-CM

## 2024-12-20 LAB
ALBUMIN SERPL ELPH-MCNC: 2.9 G/DL — LOW (ref 3.3–5)
ALP SERPL-CCNC: 93 U/L — SIGNIFICANT CHANGE UP (ref 40–120)
ALT FLD-CCNC: 42 U/L — SIGNIFICANT CHANGE UP (ref 12–78)
ANION GAP SERPL CALC-SCNC: 3 MMOL/L — LOW (ref 5–17)
APPEARANCE UR: ABNORMAL
APTT BLD: 29.2 SEC — SIGNIFICANT CHANGE UP (ref 24.5–35.6)
AST SERPL-CCNC: 24 U/L — SIGNIFICANT CHANGE UP (ref 15–37)
BACTERIA # UR AUTO: ABNORMAL /HPF
BASOPHILS # BLD AUTO: 0.07 K/UL — SIGNIFICANT CHANGE UP (ref 0–0.2)
BASOPHILS NFR BLD AUTO: 0.8 % — SIGNIFICANT CHANGE UP (ref 0–2)
BILIRUB SERPL-MCNC: 0.4 MG/DL — SIGNIFICANT CHANGE UP (ref 0.2–1.2)
BILIRUB UR-MCNC: NEGATIVE — SIGNIFICANT CHANGE UP
BUN SERPL-MCNC: 12 MG/DL — SIGNIFICANT CHANGE UP (ref 7–23)
CALCIUM SERPL-MCNC: 8.6 MG/DL — SIGNIFICANT CHANGE UP (ref 8.5–10.1)
CHLORIDE SERPL-SCNC: 107 MMOL/L — SIGNIFICANT CHANGE UP (ref 96–108)
CO2 SERPL-SCNC: 27 MMOL/L — SIGNIFICANT CHANGE UP (ref 22–31)
COLOR SPEC: YELLOW — SIGNIFICANT CHANGE UP
CREAT SERPL-MCNC: 0.8 MG/DL — SIGNIFICANT CHANGE UP (ref 0.5–1.3)
DIFF PNL FLD: NEGATIVE — SIGNIFICANT CHANGE UP
EGFR: 72 ML/MIN/1.73M2 — SIGNIFICANT CHANGE UP
EOSINOPHIL # BLD AUTO: 0.34 K/UL — SIGNIFICANT CHANGE UP (ref 0–0.5)
EOSINOPHIL NFR BLD AUTO: 3.7 % — SIGNIFICANT CHANGE UP (ref 0–6)
GLUCOSE SERPL-MCNC: 106 MG/DL — HIGH (ref 70–99)
GLUCOSE UR QL: NEGATIVE MG/DL — SIGNIFICANT CHANGE UP
HCT VFR BLD CALC: 40.9 % — SIGNIFICANT CHANGE UP (ref 34.5–45)
HGB BLD-MCNC: 13.2 G/DL — SIGNIFICANT CHANGE UP (ref 11.5–15.5)
IMM GRANULOCYTES NFR BLD AUTO: 0.2 % — SIGNIFICANT CHANGE UP (ref 0–0.9)
INR BLD: 0.91 RATIO — SIGNIFICANT CHANGE UP (ref 0.85–1.16)
KETONES UR-MCNC: NEGATIVE MG/DL — SIGNIFICANT CHANGE UP
LEUKOCYTE ESTERASE UR-ACNC: ABNORMAL
LYMPHOCYTES # BLD AUTO: 1.61 K/UL — SIGNIFICANT CHANGE UP (ref 1–3.3)
LYMPHOCYTES # BLD AUTO: 17.6 % — SIGNIFICANT CHANGE UP (ref 13–44)
MCHC RBC-ENTMCNC: 29.7 PG — SIGNIFICANT CHANGE UP (ref 27–34)
MCHC RBC-ENTMCNC: 32.3 G/DL — SIGNIFICANT CHANGE UP (ref 32–36)
MCV RBC AUTO: 91.9 FL — SIGNIFICANT CHANGE UP (ref 80–100)
MONOCYTES # BLD AUTO: 0.96 K/UL — HIGH (ref 0–0.9)
MONOCYTES NFR BLD AUTO: 10.5 % — SIGNIFICANT CHANGE UP (ref 2–14)
NEUTROPHILS # BLD AUTO: 6.13 K/UL — SIGNIFICANT CHANGE UP (ref 1.8–7.4)
NEUTROPHILS NFR BLD AUTO: 67.2 % — SIGNIFICANT CHANGE UP (ref 43–77)
NITRITE UR-MCNC: NEGATIVE — SIGNIFICANT CHANGE UP
PH UR: 5.5 — SIGNIFICANT CHANGE UP (ref 5–8)
PLATELET # BLD AUTO: 245 K/UL — SIGNIFICANT CHANGE UP (ref 150–400)
POTASSIUM SERPL-MCNC: 3.9 MMOL/L — SIGNIFICANT CHANGE UP (ref 3.5–5.3)
POTASSIUM SERPL-SCNC: 3.9 MMOL/L — SIGNIFICANT CHANGE UP (ref 3.5–5.3)
PROT SERPL-MCNC: 6.5 GM/DL — SIGNIFICANT CHANGE UP (ref 6–8.3)
PROT UR-MCNC: NEGATIVE MG/DL — SIGNIFICANT CHANGE UP
PROTHROM AB SERPL-ACNC: 10.7 SEC — SIGNIFICANT CHANGE UP (ref 9.9–13.4)
RBC # BLD: 4.45 M/UL — SIGNIFICANT CHANGE UP (ref 3.8–5.2)
RBC # FLD: 12.9 % — SIGNIFICANT CHANGE UP (ref 10.3–14.5)
RBC CASTS # UR COMP ASSIST: 2 /HPF — SIGNIFICANT CHANGE UP (ref 0–4)
SODIUM SERPL-SCNC: 137 MMOL/L — SIGNIFICANT CHANGE UP (ref 135–145)
SP GR SPEC: >1.03 — HIGH (ref 1–1.03)
SQUAMOUS # UR AUTO: 1 /HPF — SIGNIFICANT CHANGE UP (ref 0–5)
TROPONIN I, HIGH SENSITIVITY RESULT: 107.97 NG/L — HIGH
TROPONIN I, HIGH SENSITIVITY RESULT: 114.88 NG/L — HIGH
UROBILINOGEN FLD QL: 1 MG/DL — SIGNIFICANT CHANGE UP (ref 0.2–1)
WBC # BLD: 9.13 K/UL — SIGNIFICANT CHANGE UP (ref 3.8–10.5)
WBC # FLD AUTO: 9.13 K/UL — SIGNIFICANT CHANGE UP (ref 3.8–10.5)
WBC UR QL: 6 /HPF — HIGH (ref 0–5)

## 2024-12-20 PROCEDURE — 97110 THERAPEUTIC EXERCISES: CPT | Mod: GP

## 2024-12-20 PROCEDURE — 83735 ASSAY OF MAGNESIUM: CPT

## 2024-12-20 PROCEDURE — 95816 EEG AWAKE AND DROWSY: CPT | Mod: 26,1L

## 2024-12-20 PROCEDURE — 97530 THERAPEUTIC ACTIVITIES: CPT | Mod: GP

## 2024-12-20 PROCEDURE — 0042T: CPT | Mod: MC

## 2024-12-20 PROCEDURE — 92523 SPEECH SOUND LANG COMPREHEN: CPT | Mod: GN

## 2024-12-20 PROCEDURE — 93010 ELECTROCARDIOGRAM REPORT: CPT

## 2024-12-20 PROCEDURE — 92610 EVALUATE SWALLOWING FUNCTION: CPT | Mod: GN

## 2024-12-20 PROCEDURE — 99284 EMERGENCY DEPT VISIT MOD MDM: CPT

## 2024-12-20 PROCEDURE — 80053 COMPREHEN METABOLIC PANEL: CPT

## 2024-12-20 PROCEDURE — 70496 CT ANGIOGRAPHY HEAD: CPT | Mod: 26,MC

## 2024-12-20 PROCEDURE — 99222 1ST HOSP IP/OBS MODERATE 55: CPT

## 2024-12-20 PROCEDURE — 70498 CT ANGIOGRAPHY NECK: CPT | Mod: 26,MC

## 2024-12-20 PROCEDURE — 84100 ASSAY OF PHOSPHORUS: CPT

## 2024-12-20 PROCEDURE — 36415 COLL VENOUS BLD VENIPUNCTURE: CPT

## 2024-12-20 PROCEDURE — 99285 EMERGENCY DEPT VISIT HI MDM: CPT

## 2024-12-20 PROCEDURE — 70450 CT HEAD/BRAIN W/O DYE: CPT | Mod: 26,MC,XU

## 2024-12-20 PROCEDURE — 85025 COMPLETE CBC W/AUTO DIFF WBC: CPT

## 2024-12-20 PROCEDURE — 97116 GAIT TRAINING THERAPY: CPT | Mod: GP

## 2024-12-20 PROCEDURE — 97162 PT EVAL MOD COMPLEX 30 MIN: CPT | Mod: GP

## 2024-12-20 RX ORDER — CEFTRIAXONE SODIUM 1 G/1
1000 INJECTION, POWDER, FOR SOLUTION INTRAMUSCULAR; INTRAVENOUS ONCE
Refills: 0 | Status: COMPLETED | OUTPATIENT
Start: 2024-12-20 | End: 2024-12-20

## 2024-12-20 RX ORDER — SODIUM CHLORIDE 9 MG/ML
1000 INJECTION, SOLUTION INTRAMUSCULAR; INTRAVENOUS; SUBCUTANEOUS
Refills: 0 | Status: DISCONTINUED | OUTPATIENT
Start: 2024-12-20 | End: 2024-12-27

## 2024-12-20 RX ORDER — MAG HYDROX/ALUMINUM HYD/SIMETH 200-200-20
30 SUSPENSION, ORAL (FINAL DOSE FORM) ORAL EVERY 4 HOURS
Refills: 0 | Status: DISCONTINUED | OUTPATIENT
Start: 2024-12-20 | End: 2024-12-27

## 2024-12-20 RX ORDER — THIAMINE HYDROCHLORIDE 100 MG/ML
100 INJECTION, SOLUTION INTRAMUSCULAR; INTRAVENOUS DAILY
Refills: 0 | Status: DISCONTINUED | OUTPATIENT
Start: 2024-12-20 | End: 2024-12-27

## 2024-12-20 RX ORDER — CEFTRIAXONE SODIUM 1 G/1
1000 INJECTION, POWDER, FOR SOLUTION INTRAMUSCULAR; INTRAVENOUS ONCE
Refills: 0 | Status: DISCONTINUED | OUTPATIENT
Start: 2024-12-20 | End: 2024-12-20

## 2024-12-20 RX ORDER — LOSARTAN POTASSIUM 100 MG/1
25 TABLET, FILM COATED ORAL DAILY
Refills: 0 | Status: DISCONTINUED | OUTPATIENT
Start: 2024-12-20 | End: 2024-12-27

## 2024-12-20 RX ORDER — B COMPLEX, C NO.20/FOLIC ACID 1 MG
1 CAPSULE ORAL DAILY
Refills: 0 | Status: DISCONTINUED | OUTPATIENT
Start: 2024-12-20 | End: 2024-12-27

## 2024-12-20 RX ORDER — ACETAMINOPHEN 80 MG/.8ML
650 SOLUTION/ DROPS ORAL EVERY 6 HOURS
Refills: 0 | Status: DISCONTINUED | OUTPATIENT
Start: 2024-12-20 | End: 2024-12-27

## 2024-12-20 RX ORDER — METOPROLOL TARTRATE 50 MG
25 TABLET ORAL DAILY
Refills: 0 | Status: DISCONTINUED | OUTPATIENT
Start: 2024-12-20 | End: 2024-12-27

## 2024-12-20 RX ORDER — ONDANSETRON 4 MG/1
4 TABLET ORAL EVERY 8 HOURS
Refills: 0 | Status: DISCONTINUED | OUTPATIENT
Start: 2024-12-20 | End: 2024-12-27

## 2024-12-20 RX ORDER — ASPIRIN 81 MG
81 TABLET, DELAYED RELEASE (ENTERIC COATED) ORAL DAILY
Refills: 0 | Status: DISCONTINUED | OUTPATIENT
Start: 2024-12-20 | End: 2024-12-27

## 2024-12-20 RX ORDER — ATORVASTATIN CALCIUM 40 MG/1
20 TABLET, FILM COATED ORAL AT BEDTIME
Refills: 0 | Status: DISCONTINUED | OUTPATIENT
Start: 2024-12-20 | End: 2024-12-27

## 2024-12-20 RX ORDER — GINKGO BILOBA 40 MG
3 CAPSULE ORAL AT BEDTIME
Refills: 0 | Status: DISCONTINUED | OUTPATIENT
Start: 2024-12-20 | End: 2024-12-27

## 2024-12-20 RX ORDER — CEFTRIAXONE SODIUM 1 G/1
1000 INJECTION, POWDER, FOR SOLUTION INTRAMUSCULAR; INTRAVENOUS
Refills: 0 | Status: DISCONTINUED | OUTPATIENT
Start: 2024-12-20 | End: 2024-12-22

## 2024-12-20 RX ADMIN — CEFTRIAXONE SODIUM 1000 MILLIGRAM(S): 1 INJECTION, POWDER, FOR SOLUTION INTRAMUSCULAR; INTRAVENOUS at 16:46

## 2024-12-20 RX ADMIN — SODIUM CHLORIDE 90 MILLILITER(S): 9 INJECTION, SOLUTION INTRAMUSCULAR; INTRAVENOUS; SUBCUTANEOUS at 22:00

## 2024-12-20 NOTE — ED PROVIDER NOTE - PROGRESS NOTE DETAILS
spoke with son Howard who states this syncope with confusion has happened before, she normally goes rigid and takes a few hours to come back to baseline.

## 2024-12-20 NOTE — H&P ADULT - ASSESSMENT
84F admitted for syncope and AMS.    # Syncope with elevated troponin.  -Troponin 110>114.  Denies chest pain.  EKG unremarkable.  -Admit to telemetry.  -Serial troponin. Lipid panel.  -2D Echo and B/L Carotid dopplers ordered for am.  -ASA 81 mg po daily added.  -Cardiology consult request placed.  -PT/OT    # HTN and HPL.  -On Metoprolol and Atorvastatin.    # Dementia.  -Continue her multivitamins.    # DVT prophylaxis: Lovenox sq daily. 84F admitted for syncope and AMS.    # Syncope  #UTI  -Pt has had syncope and seizure work up multiple times in the past, and Son defers further workup for now unless necessary.   -UA with minimal suspicion, though d/t h/o provided by son will start treatment with ceftriaxone.   -F/u cultures    # HTN and HPL.  -On Metoprolol and Atorvastatin.    #Elevated troponins  -Trops flat  -Has had troponins in the best.   -No chest pain, ECG NSR.   -Monitor on tele.     # Dementia.  -Continue her multivitamins.    # DVT prophylaxis: Lovenox sq daily.

## 2024-12-20 NOTE — ED PROVIDER NOTE - PHYSICAL EXAMINATION
Constitutional: NAD, alert,  stuttering speech  Cardiac: RRR no MRG  Resp: clear, no wheezing or crackles  GI: ab soft ntnd, no r/g  Neuro: BROOKE,  for neuroexam is limited secondary dementia ability to follow commands  Skin: No rashes

## 2024-12-20 NOTE — STROKE CODE NOTE - NSMDCONSULT QTN_Y FT
Neurology Consult requested by:   Patient is a 84y old  Female who presents with a chief complaint of    HPI:  84-year-old woman PMHx  dementia, hypertension, hyperlipidemia BIBA  after episode of syncope earlier today around 12 PM, in ED initially mute, slowly improving. As per son, has had similar episodes in the past, usually associated with infectious events. Patient unable to provide a history or follow commands.     PAST MEDICAL & SURGICAL HISTORY:  UTI (urinary tract infection)      HTN (hypertension)      HLD (hyperlipidemia)      Anxiety and depression      Dementia      Fracture of femoral neck, right      H/O total knee replacement, bilateral      S/P lumpectomy, right breast        FAMILY HISTORY:  Family history of hypertension (Sibling)      Social Hx:  Nonsmoker, no drug or alcohol use  Medications and Allergies ReviewedMEDICATIONS  (STANDING):     ROS: Pertinent positives in HPI, all other ROS were reviewed and are negative.      Examination:   Vital Signs Last 24 Hrs  T(C): 36.7 (20 Dec 2024 12:45), Max: 36.7 (20 Dec 2024 12:45)  T(F): 98 (20 Dec 2024 12:45), Max: 98 (20 Dec 2024 12:45)  HR: 63 (20 Dec 2024 12:45) (63 - 63)  BP: 127/64 (20 Dec 2024 12:45) (127/64 - 127/64)  BP(mean): --  RR: 15 (20 Dec 2024 12:45) (15 - 15)  SpO2: 99% (20 Dec 2024 12:45) (99% - 99%)    Parameters below as of 20 Dec 2024 12:45  Patient On (Oxygen Delivery Method): room air      General: Cooperative, NAD   NECK: supple, no masses  ENT: grossly normal  Vascular : no carotid bruits,   Lungs: CTAB  Chest: RRR, no murmurs  Extremities: nontender, no edema  Musculoskeletal: no adventitious movements, no joint stiffness  Skin: no rash    Neurological Examination:  NIHSS:7  MS: Alert, inattentive, mumbling, looks at examiner, performed one commands, dysarthric   CN: Blinks to threat, PERLL, EOMI, V1-3 sensation intact, face symmetric, hearing grossly intact, palate elevates symmetrically, tongue midline, SCM equal bilaterally  Motor: normal bulk, + UEs postural tremor, limited, not cooperative, no drift, strength ~ 4/5.   Sens: Intact to light touch.    Reflexes: 0-1/4 all over, downgoing toes b/l  Coord:  Limited, not cooperative  Gait: Cannot test    Labs: Reviewed  Comprehensive Metabolic Panel (12.20.24 @ 12:51)   Sodium: 137 mmol/L  Potassium: 3.9 mmol/L  Chloride: 107 mmol/L  Carbon Dioxide: 27 mmol/L  Anion Gap: 3 mmol/L  Blood Urea Nitrogen: 12 mg/dL  Creatinine: 0.80 mg/dL  Glucose: 106 mg/dL  Calcium: 8.6 mg/dL  Protein Total: 6.5 gm/dL  Albumin: 2.9 g/dL  Bilirubin Total: 0.4 mg/dL  Alkaline Phosphatase: 93 U/L  Aspartate Aminotransferase (AST/SGOT): 24 U/L  Alanine Aminotransferase (ALT/SGPT): 42 U/L  eGFR: 72:   Complete Blood Count + Automated Diff (12.20.24 @ 12:51)   WBC Count: 9.13 K/uL  RBC Count: 4.45 M/uL  Hemoglobin: 13.2 g/dL  Hematocrit: 40.9 %  Mean Cell Volume: 91.9 fl  Mean Cell Hemoglobin: 29.7 pg  Mean Cell Hemoglobin Conc: 32.3 g/dL  Red Cell Distrib Width: 12.9 %  Platelet Count - Automated: 245 K/uL    < from: CT Brain Stroke Protocol (12.20.24 @ 12:47) >      IMPRESSION:    Noacute infarction or intracranial hemorrhage. Findings discussed with   Physician: OMA HILL  at 12:56 PM. No core infarct. Increased Tmax   in the right cerebellum and anterior left temporal lobe. No large vessel   occlusion. Consider MRI of the brain for further evaluation.    < end of copied text >    < from: CT Brain Perfusion Maps Stroke (12.20.24 @ 12:57) >    FINDINGS:    NONCONTRAST CT HEAD:  There is periventricular and subcortical white matter hypodensity without   mass effect, nonspecific, likely representing moderate chronic   microvascular ischemic changes. There is no compelling evidence for an   acute transcortical infarction. There is no evidence of mass, mass   effect, midline shift or extra-axial fluid collection. The lateral   ventricles and cortical sulci are age-appropriate in size and   configuration. Moderate inflammatory mucosal changes are seen throughout   the various portions of the paranasal sinuses. The orbits and mastoid air   cells are unremarkable. The calvarium is intact. Consider MRI as   clinically warranted.    CTA BRAIN:  Moderate bilateral cavernous carotid artery calcifications. Fetal origin   to the right posterior cerebral artery.  The [place] and vertebrobasilar system are otherwise   unremarkable without evidence of stenosis, occlusion or saccular aneurysm   dilation. No evidence for arterial venous malformation. The vertebral   arteries are codominant.      CTA NECK:  Moderate bilateral carotid bulb soft and calcific plaque extending into   the proximal internal carotid arteries.  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 There is normal relationship to   the great vessels. The common carotid arteries, internal carotid arteries   and vertebral arteries shows no evidence of high-grade stenosis,   occlusion or saccular aneurysm dilation. The vertebral arteries are   codominant.    CT PERFUSION:  Patient has only had less than 2 hours of symptoms may influence the CT   perfusion.    CBF<30% volume: 0 ml  Tmax>6.0 s volume: 14 ml in the right cerebellum and anterior left   temporal lobe  Mismatch volume: 14 ml      IMPRESSION:    No acute infarction or intracranial hemorrhage. Findings discussed with   Physician: OMA HILL  at 12:56 PM. No core infarct. Increased Tmax   in the right cerebellum and anterior left temporal lobe. No large vessel   occlusion. Consider MRI of the brain for further evaluation.    < end of copied text >      A/P:  84 yr old woman NH resident, Hx of dementia, syncope, HTN, BIBA from NH after syncope. On exam min verbal, grossly non focal, NIHSS 7.   CT head shows no acute findings, CT angios no LVO. Reportedly similar episodes in the past, was in  August this year with similar hx, w/u hen negative.    Likely metabolic encephalopathy,  with underlying dementia. r/o cardiac cause. Not TNK candidate.   Recommend;  f/u labs, "fever w/u"  EEG  EKG  Consider MR head if no improvement.    Discussed with Dr. Hill    Total Critical Care Time spent: 50 mins

## 2024-12-20 NOTE — PHARMACOTHERAPY INTERVENTION NOTE - COMMENTS
Medication reconciliation complete.  Home medications reviewed with family member at bedside and confirmed against Dr. [facility].  Reports no known medication allergies.\    Home Medications:  atorvastatin 20 mg oral tablet: 1 tab(s) orally once a day (at bedtime) (20 Dec 2024 16:03)  metoprolol succinate 25 mg oral tablet, extended release: 1 tab(s) orally once a day (20 Dec 2024 16:03)

## 2024-12-20 NOTE — ED ADULT TRIAGE NOTE - CHIEF COMPLAINT QUOTE
Pt coming in from the [facility] with c/o slurred speech and syncopal episode. EMS reports the patient had a 3 minute syncopal episode in her chair, facility concerned for a stroke. DNR/DNI. MD Miranda evaluated patient and code stroke called 12:39. Pt weight and sent to CT, emergent BG done.

## 2024-12-20 NOTE — H&P ADULT - CONVERSATION DETAILS
Patient with previous MOLST in chart as DNR/DNI, reconfirmed GOC with Son who reports being the HCP, and no changes to code status were made. He would like to keep the same GOC.

## 2024-12-20 NOTE — ED ADULT NURSE NOTE - HIV OFFER
67 yo M with hx of CAD s/p stent x 3 on plavix, HTN, HLD, and KELSEY, bilateral knee replacement, presents for L. chest and rib pain following fall and fracture of 5 L ribs in May 2020.     # Left chest and rib pain - likely musculoskeletal secondary to fall and fracture of 5 ribs in may (says pain is similar and he has tenderness to palpation) vs. less likely ACS   - trops 0.02, which decreased to 0 next set.   - Dd 400.   - CT angio negative for PE.   - tylenol PRN for pain  - f/u echo  - warm compress to left side  - f/u PT    # CAD s/p stent x 3   - continue aspirin and plavix  - Had nuclear stress test done in September 2020, which was normal.   - TTE: EF = 48%.     # HTN  - continue meds. continue to monitor    # HLD  - continue statin     # 1.1 cm LLL nodule found.  - repeat CT scan as outpatient in 3 months  - OP follow up with PCP    DVT ppx: lovenox  GI ppx: none  Diet: DASH  Increase activity as tolerated  Dispo: From home, acute  FULL CODE
Previously Declined (within the last year)

## 2024-12-20 NOTE — H&P ADULT - HISTORY OF PRESENT ILLNESS
84y Female, a resident of [facility] with significant PMH of dementia, hypertension, hyperlipidemia, anxiety and depression and others presented in ED via EMS with c/o witnessed syncopal episode.     In ED vitals stable. CBC/CMP unremarkable. Trop 107 > 114. UA cloudy with small leuks, 6 wbcs, many bacteria. CTH and CTH and neck negative for acute infarction, see full studies below. Pt admitted to  for further analysis.  84y Female, a resident of [facility] with significant PMH of dementia, hypertension, hyperlipidemia, anxiety and depression and others presented in ED via EMS with c/o witnessed syncopal episode. Patient with dementia unable to provide history. Son is at bedside and provides collateral information. As per the son, the patient has been declining over the past years, has had multiple syncopal episodes for which she has had multiple hospital admissions. Son reports the patient has had a full thorough work up including "ECHOs, ECGs, EEGs, heart monitor..." and the cause of her syncope was never identified. The most common underlying features of most of her syncopal episodes or AMS episodes has been UTIs. Son does not want any extra work up for the patient since she has had most of it already done, and would only like for her to be treated for UTI, and would like to take his mother home for kat.  In ED vitals stable. CBC/CMP unremarkable. Trop 107 > 114. UA cloudy with small leuks, 6 wbcs, many bacteria. CTH and CTH and neck negative for acute infarction, see full studies below. Pt admitted to  for further analysis.

## 2024-12-20 NOTE — H&P ADULT - NSHPLABSRESULTS_GEN_ALL_CORE
LABS:  cret                        13.2   9.13  )-----------( 245      ( 20 Dec 2024 12:51 )             40.9     12-20    137  |  107  |  12  ----------------------------<  106[H]  3.9   |  27  |  0.80    Ca    8.6      20 Dec 2024 12:51    TPro  6.5  /  Alb  2.9[L]  /  TBili  0.4  /  DBili  x   /  AST  24  /  ALT  42  /  AlkPhos  93  12-20    PT/INR - ( 20 Dec 2024 12:51 )   PT: 10.7 sec;   INR: 0.91 ratio         PTT - ( 20 Dec 2024 12:51 )  PTT:29.2 sec      Urinalysis with Rflx Culture (collected 12-20-24 @ 13:58)    < from: CT Angio Neck Stroke Protocol w/ IV Cont (12.20.24 @ 12:59) >      IMPRESSION:    Noacute infarction or intracranial hemorrhage. Findings discussed with   Physician: OMA HILL  at 12:56 PM. No core infarct. Increased Tmax   in the right cerebellum and anterior left temporal lobe. No large vessel   occlusion. Consider MRI of the brain for further evaluation.    --- End of Report ---    < end of copied text >

## 2024-12-20 NOTE — ED ADULT NURSE NOTE - OBJECTIVE STATEMENT
pt presents ot the ED from [facility] for flat affect and slurred speech. pt has a hx of dementia and is unable ot give RN input of situation.

## 2024-12-20 NOTE — PATIENT PROFILE ADULT - FALL HARM RISK - HARM RISK INTERVENTIONS

## 2024-12-20 NOTE — ED PROVIDER NOTE - CLINICAL SUMMARY MEDICAL DECISION MAKING FREE TEXT BOX
84-year-old female with a history of dementia unclear baseline, hypertension, hyperlipidemia presenting with altered mental status and episode of syncope 40 minutes ago.  Per EMS patient syncopized for a few minutes and is not speaking as she normally does.  Per EMS patient is normally talkative now with stuttering speech..   Patient unable to provide a history or follow commands.  stroke note called for speech deficit otherwise moving all extremities.     neuro consult, labs, ct, urine

## 2024-12-21 DIAGNOSIS — N39.0 URINARY TRACT INFECTION, SITE NOT SPECIFIED: ICD-10-CM

## 2024-12-21 LAB
ALBUMIN SERPL ELPH-MCNC: 3 G/DL — LOW (ref 3.3–5)
ALP SERPL-CCNC: 90 U/L — SIGNIFICANT CHANGE UP (ref 40–120)
ALT FLD-CCNC: 38 U/L — SIGNIFICANT CHANGE UP (ref 12–78)
ANION GAP SERPL CALC-SCNC: 5 MMOL/L — SIGNIFICANT CHANGE UP (ref 5–17)
AST SERPL-CCNC: 19 U/L — SIGNIFICANT CHANGE UP (ref 15–37)
BASOPHILS # BLD AUTO: 0.06 K/UL — SIGNIFICANT CHANGE UP (ref 0–0.2)
BASOPHILS NFR BLD AUTO: 1.1 % — SIGNIFICANT CHANGE UP (ref 0–2)
BILIRUB SERPL-MCNC: 0.5 MG/DL — SIGNIFICANT CHANGE UP (ref 0.2–1.2)
BUN SERPL-MCNC: 10 MG/DL — SIGNIFICANT CHANGE UP (ref 7–23)
CALCIUM SERPL-MCNC: 8.8 MG/DL — SIGNIFICANT CHANGE UP (ref 8.5–10.1)
CHLORIDE SERPL-SCNC: 107 MMOL/L — SIGNIFICANT CHANGE UP (ref 96–108)
CO2 SERPL-SCNC: 26 MMOL/L — SIGNIFICANT CHANGE UP (ref 22–31)
CREAT SERPL-MCNC: 0.64 MG/DL — SIGNIFICANT CHANGE UP (ref 0.5–1.3)
EGFR: 86 ML/MIN/1.73M2 — SIGNIFICANT CHANGE UP
EOSINOPHIL # BLD AUTO: 0.23 K/UL — SIGNIFICANT CHANGE UP (ref 0–0.5)
EOSINOPHIL NFR BLD AUTO: 4.1 % — SIGNIFICANT CHANGE UP (ref 0–6)
GLUCOSE SERPL-MCNC: 100 MG/DL — HIGH (ref 70–99)
HCT VFR BLD CALC: 39.1 % — SIGNIFICANT CHANGE UP (ref 34.5–45)
HGB BLD-MCNC: 12.7 G/DL — SIGNIFICANT CHANGE UP (ref 11.5–15.5)
IMM GRANULOCYTES NFR BLD AUTO: 0.4 % — SIGNIFICANT CHANGE UP (ref 0–0.9)
LYMPHOCYTES # BLD AUTO: 1.25 K/UL — SIGNIFICANT CHANGE UP (ref 1–3.3)
LYMPHOCYTES # BLD AUTO: 22.4 % — SIGNIFICANT CHANGE UP (ref 13–44)
MAGNESIUM SERPL-MCNC: 1.9 MG/DL — SIGNIFICANT CHANGE UP (ref 1.6–2.6)
MCHC RBC-ENTMCNC: 29.3 PG — SIGNIFICANT CHANGE UP (ref 27–34)
MCHC RBC-ENTMCNC: 32.5 G/DL — SIGNIFICANT CHANGE UP (ref 32–36)
MCV RBC AUTO: 90.1 FL — SIGNIFICANT CHANGE UP (ref 80–100)
MONOCYTES # BLD AUTO: 0.67 K/UL — SIGNIFICANT CHANGE UP (ref 0–0.9)
MONOCYTES NFR BLD AUTO: 12 % — SIGNIFICANT CHANGE UP (ref 2–14)
NEUTROPHILS # BLD AUTO: 3.34 K/UL — SIGNIFICANT CHANGE UP (ref 1.8–7.4)
NEUTROPHILS NFR BLD AUTO: 60 % — SIGNIFICANT CHANGE UP (ref 43–77)
PHOSPHATE SERPL-MCNC: 2.9 MG/DL — SIGNIFICANT CHANGE UP (ref 2.5–4.5)
PLATELET # BLD AUTO: 243 K/UL — SIGNIFICANT CHANGE UP (ref 150–400)
POTASSIUM SERPL-MCNC: 3.8 MMOL/L — SIGNIFICANT CHANGE UP (ref 3.5–5.3)
POTASSIUM SERPL-SCNC: 3.8 MMOL/L — SIGNIFICANT CHANGE UP (ref 3.5–5.3)
PROT SERPL-MCNC: 6.4 GM/DL — SIGNIFICANT CHANGE UP (ref 6–8.3)
RBC # BLD: 4.34 M/UL — SIGNIFICANT CHANGE UP (ref 3.8–5.2)
RBC # FLD: 13 % — SIGNIFICANT CHANGE UP (ref 10.3–14.5)
SODIUM SERPL-SCNC: 138 MMOL/L — SIGNIFICANT CHANGE UP (ref 135–145)
WBC # BLD: 5.57 K/UL — SIGNIFICANT CHANGE UP (ref 3.8–10.5)
WBC # FLD AUTO: 5.57 K/UL — SIGNIFICANT CHANGE UP (ref 3.8–10.5)

## 2024-12-21 PROCEDURE — 99232 SBSQ HOSP IP/OBS MODERATE 35: CPT

## 2024-12-21 RX ADMIN — Medication 81 MILLIGRAM(S): at 09:20

## 2024-12-21 RX ADMIN — THIAMINE HYDROCHLORIDE 100 MILLIGRAM(S): 100 INJECTION, SOLUTION INTRAMUSCULAR; INTRAVENOUS at 09:21

## 2024-12-21 RX ADMIN — CEFTRIAXONE SODIUM 1000 MILLIGRAM(S): 1 INJECTION, POWDER, FOR SOLUTION INTRAMUSCULAR; INTRAVENOUS at 18:11

## 2024-12-21 RX ADMIN — LOSARTAN POTASSIUM 25 MILLIGRAM(S): 100 TABLET, FILM COATED ORAL at 09:20

## 2024-12-21 RX ADMIN — ATORVASTATIN CALCIUM 20 MILLIGRAM(S): 40 TABLET, FILM COATED ORAL at 21:52

## 2024-12-21 RX ADMIN — Medication 1 TABLET(S): at 09:20

## 2024-12-21 RX ADMIN — Medication 25 MILLIGRAM(S): at 09:20

## 2024-12-21 RX ADMIN — Medication 3 MILLIGRAM(S): at 21:53

## 2024-12-21 NOTE — PHYSICAL THERAPY INITIAL EVALUATION ADULT - PERTINENT HX OF CURRENT PROBLEM, REHAB EVAL
84y F presented in ED via EMS with c/o witnessed syncopal episode. admitted for syncope and AMS.  CT head, CTA head and neck, CT perfusion 12/20/24:, Noacute infarction or intracranial hemorrhage  No core infarct. Increased Tmax in the right cerebellum and anterior left temporal lobe. No large vessel occlusion.  PMH of dementia, hypertension, hyperlipidemia, anxiety and depression

## 2024-12-21 NOTE — SWALLOW BEDSIDE ASSESSMENT ADULT - SWALLOW EVAL: DIAGNOSIS
1) On encounter, the pt was alert and interactive but communicatively passive as well as internally distractible at times. The pt was able to verbalize during communicative probes at times. At these times, pt's motor speech integrity was preserved and her verbalizations were linguistically intact, albeit often brief/sometimes tangential/sometimes contextually irrelevant indicative of Cognitive Dysfunction which is chronic from Dementia. Pt's son feels that she is at speech-language baseline.

## 2024-12-21 NOTE — SWALLOW BEDSIDE ASSESSMENT ADULT - SWALLOW EVAL: RECOMMENDED DIET
SUGGEST A REGULAR CONSISTENCY DIET WITH THIN LIQUID TEXTURES AS THE PATIENT APPEARED CLINICALLY TOLERANT OF THESE FOOD CONSISTENCIES FROM AN OROPHARYNGEAL SWALLOWING PERSPECTIVE ON EXAM, DESPITE DENTAL COMPROMISE. FURTHER, FOOD CONSISTENCIES ON THIS DIET ACCOMMODATES HER REPORTED FOOD PREFERENCES.

## 2024-12-21 NOTE — PROGRESS NOTE ADULT - ASSESSMENT
Ms. Thakur is an 84 yr old woman NH resident, Hx of dementia, syncope, HTN, BIBA from NH after an episode of syncope.   On exam she was minimally verbal and had a grossly non-focal neurological examination.  She is now more alert.  Imaging in the ED did not show any acute abnormalities.  Presentation is more suggestive of metabolic encephalopathy superimposed on underlying dementia.  UA was cloudy with small leuks, 6 wbcs, many bacteria. She was started on antibiotics for possible UTI.  Her son expressed that he did not want more workup for syncope as she has had an extensive workup previously.  If she does not improve or if presentation becomes more suspicious for stroke, can get MRI brain.    Will f/u if needed.

## 2024-12-21 NOTE — PHYSICAL THERAPY INITIAL EVALUATION ADULT - NSPTDMEREC_GEN_A_CORE
Pt probable has RW at home, Pt will requires a rolling walker at home due to their dx of difficulty walking to help complete their MRADL's./rolling walker

## 2024-12-21 NOTE — SWALLOW BEDSIDE ASSESSMENT ADULT - SWALLOW EVAL: PROGNOSIS
2) The pt is edentulous with presence of a maxillary denture only but son stated that she is able to tolerate food consistencies on a regular consistency diet. Son also stated that his mother prefers to be on a regular consistency diet and can choose specific foods/cut foods on her own to accommodate missing dentition. Pt on regular texture diet at [facility]. On exam, pt's Oral Processing with foods that require mastication are mildly to moderately prolonged in setting of edentulous status/presence of only a maxillary denture but mechanically functional nonetheless. Bolus formation/transfer with puree food-liquids were achieved via functional age acceptable piecemeal deglutition. Pharyngeal integrity is felt to be functional for age as well. NO behavioral aspiration signs exhibited. No change in O2 sats noted. Odynophagia was denied. Oropharyngeal swallowing integrity at usual state.

## 2024-12-21 NOTE — SWALLOW BEDSIDE ASSESSMENT ADULT - DIET PRIOR TO ADMI
The pt was reportedly on a regular texture diet without a liquid consistency restriction at [facility].

## 2024-12-21 NOTE — PHYSICAL THERAPY INITIAL EVALUATION ADULT - MODALITIES TREATMENT COMMENTS
Pt seen for this session, able to follow simple directives and manual cues, Pt returned to supine for rest , +alarm, CBIR, RN aware, Extra blanket given for comfort.

## 2024-12-21 NOTE — PROGRESS NOTE ADULT - ASSESSMENT
84F admitted for syncope and AMS.    #Syncope (long history)  #UTI  -Pt has had syncope and seizure work up multiple times in the past, and Son defers further workup for now unless necessary  -Continue Ceftriaxone for UTI -- pending cultures   -F/u urine cultures    # HTN and HPL  -On Metoprolol and Atorvastatin.    #Elevated troponins  -Trops flat  -Has had troponins in the best.   -No chest pain, ECG NSR.   -Monitor on tele.     # Dementia.  -Continue her multivitamins    # DVT prophylaxis: Lovenox sq daily.    DISPO: Likely discharge back to assisted living tomorrow. 84F admitted for syncope and AMS.    #Syncope (long history)  #UTI  -Pt has had syncope and seizure work up multiple times in the past, and Son defers further workup for now unless necessary  -Continue Ceftriaxone for UTI -- pending cultures   -F/u urine cultures    # HTN and HPL  -On Metoprolol and Atorvastatin.    #Elevated troponins  -Trops flat  -Has had troponins in the best.   -No chest pain, ECG NSR.   -Monitor on tele.     # Dementia.  -Continue her multivitamins    # DVT prophylaxis: Lovenox sq daily.    DISPO: Likely discharge back to assisted living tomorrow.    Discussed plan with Son at bedside

## 2024-12-21 NOTE — PHYSICAL THERAPY INITIAL EVALUATION ADULT - GENERAL OBSERVATIONS, REHAB EVAL
Pt seen on 3N, NAD, denies pain, Alert and Oriented to self, and stated she was here because she doesn't feel well. Disoriented to time. Follows simple directives.

## 2024-12-21 NOTE — SWALLOW BEDSIDE ASSESSMENT ADULT - COMMENTS
The pt was admitted to  from [facility] status post syncope. Hospital course is notable for cloudy urine. EEG was conducted & is remarkable only for bilateral diffuse slowing w/o seizures. Son feels pt is currently at baseline. Pt was hospitalized at this facility in 4/24 status post fall with right hip pain. Hospital course in 4/24 was remarkable for right femur fracture status post repair, abnormal UA and episode of reduced responsiveness. A Code Stroke was called during that hospitalization but CTH was negative. Her alertness than spontaneously improved. This profile is superimposed upon a history of Dementia, depression, anxiety, HTN, HLD, prior right lumpectomy and past bilateral TKR's.

## 2024-12-21 NOTE — SWALLOW BEDSIDE ASSESSMENT ADULT - NS SPL SWALLOW CLINIC TRIAL FT
The patient is edentulous/has only a maxillary denture but son stated that pt is able to tolerate food consistencies on a regular consistency diet. Son also stated that his mother prefers to be on a regular consistency diet and can choose specific foods/cut foods on her own to accommodate dental compromise. Pt reportedly on a regular texture diet at [facility]. On exam, pt's Oral Processing with foods that require mastication are mildly to moderately prolonged in setting of edentulous status with presence of only a maxillary denture but mechanically functional nonetheless. Bolus formation/transfer with puree food-liquids were achieved via functional age acceptable piecemeal deglutition. Pharyngeal integrity is felt to be functional for age as well. Swallow triggered in an acceptable time frame for age and laryngeal lift on palpation during swallowing trials was felt to be functional for age as well. NO behavioral aspiration signs exhibited. No change in O2 sats noted. Odynophagia was denied. Oropharyngeal swallowing integrity at usual state.

## 2024-12-21 NOTE — PHYSICAL THERAPY INITIAL EVALUATION ADULT - LIVES WITH, PROFILE
resident of [facility] resident of [facility] as per chart

## 2024-12-21 NOTE — SWALLOW BEDSIDE ASSESSMENT ADULT - SLP GENERAL OBSERVATIONS
On encounter, the pt was alert and interactive but communicatively passive as well as internally distractible at times. The pt was able to verbalize during communicative probes at times. At these times, pt's motor speech integrity was preserved and her verbalizations were linguistically intact, albeit often brief/sometimes tangential/sometimes contextually irrelevant indicative of Cognitive Dysfunction which is chronic from Dementia. Pt's son feels that she is at speech-language baseline

## 2024-12-22 LAB
CULTURE RESULTS: ABNORMAL
SPECIMEN SOURCE: SIGNIFICANT CHANGE UP

## 2024-12-22 PROCEDURE — 99232 SBSQ HOSP IP/OBS MODERATE 35: CPT

## 2024-12-22 RX ORDER — CEFUROXIME AXETIL 250 MG
500 TABLET ORAL EVERY 12 HOURS
Refills: 0 | Status: COMPLETED | OUTPATIENT
Start: 2024-12-22 | End: 2024-12-25

## 2024-12-22 RX ADMIN — Medication 1 TABLET(S): at 09:45

## 2024-12-22 RX ADMIN — THIAMINE HYDROCHLORIDE 100 MILLIGRAM(S): 100 INJECTION, SOLUTION INTRAMUSCULAR; INTRAVENOUS at 09:45

## 2024-12-22 RX ADMIN — Medication 81 MILLIGRAM(S): at 09:45

## 2024-12-22 RX ADMIN — ATORVASTATIN CALCIUM 20 MILLIGRAM(S): 40 TABLET, FILM COATED ORAL at 22:41

## 2024-12-22 RX ADMIN — Medication 25 MILLIGRAM(S): at 09:45

## 2024-12-22 RX ADMIN — LOSARTAN POTASSIUM 25 MILLIGRAM(S): 100 TABLET, FILM COATED ORAL at 09:45

## 2024-12-22 RX ADMIN — Medication 500 MILLIGRAM(S): at 22:42

## 2024-12-22 NOTE — PROGRESS NOTE ADULT - ASSESSMENT
84F admitted for syncope and AMS.    #Syncope (long history)  #UTI  -Pt has had syncope and seizure work up multiple times in the past, and Son defers further workup for now unless necessary  - Febrile overnight   -Continue Ceftriaxone for UTI -- pending cultures     # HTN and HPL  -On Metoprolol and Atorvastatin.    #Elevated troponins  -Trops flat  -Has had troponins in the best.   -No chest pain, ECG NSR.   -Monitor on tele.     # Dementia.  -Continue her multivitamins    #Severe protein-calorie malnutrition    # DVT prophylaxis: Lovenox sq daily.    DISPO: Plan for [facility] (Son interested in [facility])    Discussed plan with Son at bedside

## 2024-12-22 NOTE — DIETITIAN INITIAL EVALUATION ADULT - PERTINENT MEDS FT
MEDICATIONS  (STANDING):  aspirin enteric coated 81 milliGRAM(s) Oral daily  atorvastatin 20 milliGRAM(s) Oral at bedtime  cefTRIAXone Injectable. 1000 milliGRAM(s) IV Push <User Schedule>  losartan 25 milliGRAM(s) Oral daily  metoprolol succinate ER 25 milliGRAM(s) Oral daily  multivitamin 1 Tablet(s) Oral daily  sodium chloride 0.9%. 1000 milliLiter(s) (90 mL/Hr) IV Continuous <Continuous>  thiamine 100 milliGRAM(s) Oral daily    MEDICATIONS  (PRN):  acetaminophen     Tablet .. 650 milliGRAM(s) Oral every 6 hours PRN Temp greater or equal to 38C (100.4F), Mild Pain (1 - 3)  aluminum hydroxide/magnesium hydroxide/simethicone Suspension 30 milliLiter(s) Oral every 4 hours PRN Dyspepsia  melatonin 3 milliGRAM(s) Oral at bedtime PRN Insomnia  ondansetron Injectable 4 milliGRAM(s) IV Push every 8 hours PRN Nausea and/or Vomiting

## 2024-12-22 NOTE — PROGRESS NOTE ADULT - ASSESSMENT
Ms. Thakur is an 84 yr old woman NH resident, Hx of dementia, syncope, HTN, BIBA from NH after an episode of syncope.   On exam she was minimally verbal and had a grossly non-focal neurological examination.  She is now more alert.  Imaging in the ED did not show any acute abnormalities.  Presentation is more suggestive of metabolic encephalopathy superimposed on underlying dementia.  UA was cloudy with small leuks, 6 wbcs, many bacteria. She was started on antibiotics for possible UTI. f/u Urine cultures  Her son expressed that he did not want more workup for syncope as she has had an extensive workup previously.  If she does not improve or if presentation becomes more suspicious for stroke, can get MRI brain.    Likely d/c back to assisted living after urine cultures.

## 2024-12-22 NOTE — DIETITIAN INITIAL EVALUATION ADULT - OTHER INFO
84y Female, a resident of [facility] with significant PMH of dementia, hypertension, hyperlipidemia, anxiety and depression and others presented in ED via EMS with c/o witnessed syncopal episode. Patient with dementia unable to provide history. Son is at bedside and provides collateral information. As per the son, the patient has been declining over the past years, has had multiple syncopal episodes for which she has had multiple hospital admissions. Son reports the patient has had a full thorough work up including "ECHOs, ECGs, EEGs, heart monitor..." and the cause of her syncope was never identified. The most common underlying features of most of her syncopal episodes or AMS episodes has been UTIs. Son does not want any extra work up for the patient since she has had most of it already done, and would only like for her to be treated for UTI, and would like to take his mother home for [place]. 84y Female, a resident of [facility] with significant PMH of dementia, hypertension, hyperlipidemia, anxiety and depression and others presented in ED via EMS with c/o witnessed syncopal episode. Patient with dementia unable to provide history. Son is at bedside and provides collateral information. As per the son, the patient has been declining over the past years, has had multiple syncopal episodes for which she has had multiple hospital admissions. Son reports the patient has had a full thorough work up including "ECHOs, ECGs, EEGs, heart monitor..." and the cause of her syncope was never identified. The most common underlying features of most of her syncopal episodes or AMS episodes has been UTIs. Son does not want any extra work up for the patient since she has had most of it already done, and would only like for her to be treated for UTI, and would like to take his mother home for [place].    Admit dx is UTI  RD bedscale weight is    61 kg   134#    12/22  Previous rd bedscale wt  62 kg  137#    8/14  NFPE reveals muscle wasting, fat wasting severe and moderate  Suggest maintain regular diet to maximize protein and kcal intake  Add Ensure plus bid  MVI w/ minerals daily to ensure 100% RDA met   Record PO intake in EMR after each meal (flowsheet, nursing.)   Consider adding thiamine 100 mg daily 2/2 poor PO intake/ malnutrition  Monitor bowel movements, if no BM for >3 days, consider implementing bowel regimen.   suggest Confirm Goals of Care regarding nutrition support. Will provide nutrition/ hydration within goals of care.   Consider adding appetite stimulant such as Remeron or Marinol 2/2 chronically poor appetite/ PO intake   Recommendations to follow in Plan/Intervention

## 2024-12-22 NOTE — DIETITIAN INITIAL EVALUATION ADULT - PERTINENT LABORATORY DATA
12-21    138  |  107  |  10  ----------------------------<  100[H]  3.8   |  26  |  0.64    Ca    8.8      21 Dec 2024 07:16  Phos  2.9     12-21  Mg     1.9     12-21    TPro  6.4  /  Alb  3.0[L]  /  TBili  0.5  /  DBili  x   /  AST  19  /  ALT  38  /  AlkPhos  90  12-21

## 2024-12-22 NOTE — DIETITIAN INITIAL EVALUATION ADULT - ADD RECOMMEND
Maintain regular diet  Ensure plus bid  MVI w/ minerals daily to ensure 100% RDA met   Record PO intake in EMR after each meal (flowsheet, nursing.)   Consider adding thiamine 100 mg daily 2/2 poor PO intake/ malnutrition  Monitor bowel movements, if no BM for >3 days, consider implementing bowel regimen.   suggest Confirm Goals of Care regarding nutrition support. Will provide nutrition/ hydration within goals of care.   Consider adding appetite stimulant such as Remeron or Marinol 2/2 chronically poor appetite/ PO intake   Monitor PO intake, tolerance, labs and weight.

## 2024-12-22 NOTE — DIETITIAN INITIAL EVALUATION ADULT - ORAL INTAKE PTA/DIET HISTORY
Pt is from [facility], has dementia and does not give much hx.  MOLST in chart indicates NO TUBE FEED.  PO intake estimated < 75% ENN > one month.

## 2024-12-22 NOTE — DIETITIAN INITIAL EVALUATION ADULT - NAME AND PHONE
Mckenzie Lin RDN, CDN, [facility]      [phone]   [email or web address]

## 2024-12-23 PROCEDURE — 99232 SBSQ HOSP IP/OBS MODERATE 35: CPT

## 2024-12-23 RX ADMIN — Medication 81 MILLIGRAM(S): at 09:52

## 2024-12-23 RX ADMIN — LOSARTAN POTASSIUM 25 MILLIGRAM(S): 100 TABLET, FILM COATED ORAL at 09:52

## 2024-12-23 RX ADMIN — THIAMINE HYDROCHLORIDE 100 MILLIGRAM(S): 100 INJECTION, SOLUTION INTRAMUSCULAR; INTRAVENOUS at 09:52

## 2024-12-23 RX ADMIN — Medication 25 MILLIGRAM(S): at 09:52

## 2024-12-23 RX ADMIN — ATORVASTATIN CALCIUM 20 MILLIGRAM(S): 40 TABLET, FILM COATED ORAL at 21:49

## 2024-12-23 RX ADMIN — Medication 500 MILLIGRAM(S): at 22:50

## 2024-12-23 RX ADMIN — Medication 500 MILLIGRAM(S): at 09:52

## 2024-12-23 RX ADMIN — Medication 1 TABLET(S): at 09:52

## 2024-12-23 NOTE — PROGRESS NOTE ADULT - ASSESSMENT
84F admitted for syncope and AMS.    #Syncope (long history)  #UTI  -Pt has had syncope and seizure work up multiple times in the past, and Son defers further workup for now unless necessary  -Afebile x 48 hours  -s/p Ceftriaxone  -Urine cx reviewed  -Transition to Ceftin. Will complete course 12/24    # HTN and HPL  -On Metoprolol and Atorvastatin.    #Elevated troponins  -Trops flat  -Has had troponins in the past  -No chest pain, ECG NSR.     # Dementia.  -Continue her multivitamins    #Severe protein-calorie malnutrition    # DVT prophylaxis: Lovenox sq daily.    DISPO: Stable medically. Plan for [facility] however needs auth -- start today     Discussed plan with Son

## 2024-12-24 PROCEDURE — 99232 SBSQ HOSP IP/OBS MODERATE 35: CPT

## 2024-12-24 RX ADMIN — Medication 1 TABLET(S): at 09:28

## 2024-12-24 RX ADMIN — Medication 25 MILLIGRAM(S): at 09:28

## 2024-12-24 RX ADMIN — Medication 500 MILLIGRAM(S): at 09:28

## 2024-12-24 RX ADMIN — Medication 81 MILLIGRAM(S): at 09:28

## 2024-12-24 RX ADMIN — Medication 500 MILLIGRAM(S): at 21:05

## 2024-12-24 RX ADMIN — LOSARTAN POTASSIUM 25 MILLIGRAM(S): 100 TABLET, FILM COATED ORAL at 10:43

## 2024-12-24 RX ADMIN — THIAMINE HYDROCHLORIDE 100 MILLIGRAM(S): 100 INJECTION, SOLUTION INTRAMUSCULAR; INTRAVENOUS at 09:28

## 2024-12-24 RX ADMIN — ATORVASTATIN CALCIUM 20 MILLIGRAM(S): 40 TABLET, FILM COATED ORAL at 21:05

## 2024-12-24 NOTE — PROGRESS NOTE ADULT - ASSESSMENT
84F admitted for syncope and AMS.    #Syncope (long history)  #UTI  -Pt has had syncope and seizure work up multiple times in the past, and Son defers further workup for now unless necessary  -Afebile x 48 hours  -s/p Ceftriaxone  -Urine cx reviewed  -Transition to Ceftin. Will complete course 12/24    # HTN and HPL  -On Metoprolol and Atorvastatin.    #Elevated troponins  -Trops flat  -Has had troponins in the past  -No chest pain, ECG NSR.     # Dementia.  -Continue her multivitamins    #Severe protein-calorie malnutrition    # DVT prophylaxis: Lovenox sq daily.    DISPO: Stable medically. Plan for [facility]; await auth

## 2024-12-25 LAB
CULTURE RESULTS: SIGNIFICANT CHANGE UP
SPECIMEN SOURCE: SIGNIFICANT CHANGE UP

## 2024-12-25 PROCEDURE — 99232 SBSQ HOSP IP/OBS MODERATE 35: CPT

## 2024-12-25 RX ADMIN — Medication 1 TABLET(S): at 10:10

## 2024-12-25 RX ADMIN — Medication 81 MILLIGRAM(S): at 10:10

## 2024-12-25 RX ADMIN — Medication 25 MILLIGRAM(S): at 10:11

## 2024-12-25 RX ADMIN — LOSARTAN POTASSIUM 25 MILLIGRAM(S): 100 TABLET, FILM COATED ORAL at 10:11

## 2024-12-25 RX ADMIN — THIAMINE HYDROCHLORIDE 100 MILLIGRAM(S): 100 INJECTION, SOLUTION INTRAMUSCULAR; INTRAVENOUS at 10:10

## 2024-12-25 RX ADMIN — Medication 500 MILLIGRAM(S): at 10:11

## 2024-12-25 RX ADMIN — ATORVASTATIN CALCIUM 20 MILLIGRAM(S): 40 TABLET, FILM COATED ORAL at 23:01

## 2024-12-25 RX ADMIN — Medication 3 MILLIGRAM(S): at 23:01

## 2024-12-26 LAB — GLUCOSE BLDC GLUCOMTR-MCNC: 124 MG/DL — HIGH (ref 70–99)

## 2024-12-26 PROCEDURE — 99232 SBSQ HOSP IP/OBS MODERATE 35: CPT

## 2024-12-26 RX ADMIN — ATORVASTATIN CALCIUM 20 MILLIGRAM(S): 40 TABLET, FILM COATED ORAL at 22:36

## 2024-12-26 RX ADMIN — LOSARTAN POTASSIUM 25 MILLIGRAM(S): 100 TABLET, FILM COATED ORAL at 10:17

## 2024-12-26 RX ADMIN — THIAMINE HYDROCHLORIDE 100 MILLIGRAM(S): 100 INJECTION, SOLUTION INTRAMUSCULAR; INTRAVENOUS at 10:17

## 2024-12-26 RX ADMIN — Medication 81 MILLIGRAM(S): at 10:16

## 2024-12-26 RX ADMIN — Medication 1 TABLET(S): at 10:16

## 2024-12-26 RX ADMIN — Medication 25 MILLIGRAM(S): at 10:16

## 2024-12-26 NOTE — PROGRESS NOTE ADULT - SUBJECTIVE AND OBJECTIVE BOX
HOSPITALIST PROGRESS NOTE    SUBJECTIVE / INTERVAL HPI: Patient seen and examined at bedside. Hx limited due to dementia. No signs of distress    ROS: Unable to obtian due to dementia     VITAL SIGNS:  Vital Signs Last 24 Hrs  T(C): 36.9 (21 Dec 2024 08:29), Max: 37.4 (20 Dec 2024 18:24)  T(F): 98.4 (21 Dec 2024 08:29), Max: 99.3 (20 Dec 2024 18:24)  HR: 70 (21 Dec 2024 08:29) (64 - 70)  BP: 152/62 (21 Dec 2024 08:29) (114/46 - 152/62)  BP(mean): 67 (20 Dec 2024 18:24) (67 - 67)  RR: 18 (21 Dec 2024 08:29) (17 - 18)  SpO2: 94% (21 Dec 2024 08:29) (94% - 100%)    Parameters below as of 21 Dec 2024 08:29  Patient On (Oxygen Delivery Method): room air      PHYSICAL EXAM:  General: No acute distress  HEENT: NC/AT; PERRL, anicteric sclera; MMM  Neck: Supple  Cardiovascular: +S1/S2, RRR, no murmurs, rubs, gallops  Respiratory: CTA B/L; no W/R/R  Gastrointestinal: soft, NT/ND; +BSx4  Extremities: WWP; no edema  Vascular: 2+ radial, DP/PT pulses B/L  Neurological: AAOx1    MEDICATIONS:  MEDICATIONS  (STANDING):  aspirin enteric coated 81 milliGRAM(s) Oral daily  atorvastatin 20 milliGRAM(s) Oral at bedtime  cefTRIAXone Injectable. 1000 milliGRAM(s) IV Push <User Schedule>  losartan 25 milliGRAM(s) Oral daily  metoprolol succinate ER 25 milliGRAM(s) Oral daily  multivitamin 1 Tablet(s) Oral daily  sodium chloride 0.9%. 1000 milliLiter(s) (90 mL/Hr) IV Continuous <Continuous>  thiamine 100 milliGRAM(s) Oral daily    MEDICATIONS  (PRN):  acetaminophen     Tablet .. 650 milliGRAM(s) Oral every 6 hours PRN Temp greater or equal to 38C (100.4F), Mild Pain (1 - 3)  aluminum hydroxide/magnesium hydroxide/simethicone Suspension 30 milliLiter(s) Oral every 4 hours PRN Dyspepsia  melatonin 3 milliGRAM(s) Oral at bedtime PRN Insomnia  ondansetron Injectable 4 milliGRAM(s) IV Push every 8 hours PRN Nausea and/or Vomiting      ALLERGIES:  Allergies    No Known Allergies    Intolerances        LABS:                        12.7   5.57  )-----------( 243      ( 21 Dec 2024 07:16 )             39.1     12-21    138  |  107  |  10  ----------------------------<  100[H]  3.8   |  26  |  0.64    Ca    8.8      21 Dec 2024 07:16  Phos  2.9     12-21  Mg     1.9     12-21    TPro  6.4  /  Alb  3.0[L]  /  TBili  0.5  /  DBili  x   /  AST  19  /  ALT  38  /  AlkPhos  90  12-21    PT/INR - ( 20 Dec 2024 12:51 )   PT: 10.7 sec;   INR: 0.91 ratio         PTT - ( 20 Dec 2024 12:51 )  PTT:29.2 sec  Urinalysis Basic - ( 21 Dec 2024 07:16 )    Color: x / Appearance: x / SG: x / pH: x  Gluc: 100 mg/dL / Ketone: x  / Bili: x / Urobili: x   Blood: x / Protein: x / Nitrite: x   Leuk Esterase: x / RBC: x / WBC x   Sq Epi: x / Non Sq Epi: x / Bacteria: x      CAPILLARY BLOOD GLUCOSE        Blood, Urine: Negative (12-20 @ 13:58)      RADIOLOGY & ADDITIONAL TESTS: Reviewed.
HOSPITALIST PROGRESS NOTE    SUBJECTIVE / INTERVAL HPI: Patient seen and examined at bedside. No complaints. Non toxic appearing. Hx limited due to dementia.       ROS: All 10 systems reviewed and found to be negative with the exception of what has been described above.     VITAL SIGNS:    Vital Signs Last 24 Hrs  T(C): 36.8 (25 Dec 2024 09:30), Max: 37 (24 Dec 2024 17:01)  T(F): 98.2 (25 Dec 2024 09:30), Max: 98.6 (24 Dec 2024 17:01)  HR: 68 (25 Dec 2024 09:30) (68 - 85)  BP: 165/74 (25 Dec 2024 09:30) (118/60 - 165/74)  BP(mean): 73 (24 Dec 2024 23:29) (73 - 73)  RR: 18 (25 Dec 2024 09:30) (18 - 18)  SpO2: 96% (25 Dec 2024 09:30) (94% - 96%)    Parameters below as of 25 Dec 2024 09:30  Patient On (Oxygen Delivery Method): room air        PHYSICAL EXAM:  General: No acute distress  HEENT: NC/AT; PERRL, anicteric sclera; MMM  Neck: Supple  Cardiovascular: +S1/S2, RRR, no murmurs, rubs, gallops  Respiratory: CTA B/L; no W/R/R  Gastrointestinal: soft, NT/ND; +BSx4  Extremities: WWP; no edema  Vascular: 2+ radial, DP/PT pulses B/L  Neurological: AAOx1    MEDICATIONS:  MEDICATIONS  (STANDING):  aspirin enteric coated 81 milliGRAM(s) Oral daily  atorvastatin 20 milliGRAM(s) Oral at bedtime  cefuroxime   Tablet 500 milliGRAM(s) Oral every 12 hours  losartan 25 milliGRAM(s) Oral daily  metoprolol succinate ER 25 milliGRAM(s) Oral daily  multivitamin 1 Tablet(s) Oral daily  sodium chloride 0.9%. 1000 milliLiter(s) (90 mL/Hr) IV Continuous <Continuous>  thiamine 100 milliGRAM(s) Oral daily    MEDICATIONS  (PRN):  acetaminophen     Tablet .. 650 milliGRAM(s) Oral every 6 hours PRN Temp greater or equal to 38C (100.4F), Mild Pain (1 - 3)  aluminum hydroxide/magnesium hydroxide/simethicone Suspension 30 milliLiter(s) Oral every 4 hours PRN Dyspepsia  melatonin 3 milliGRAM(s) Oral at bedtime PRN Insomnia  ondansetron Injectable 4 milliGRAM(s) IV Push every 8 hours PRN Nausea and/or Vomiting      ALLERGIES:  Allergies    No Known Allergies    Intolerances        LABS:              CAPILLARY BLOOD GLUCOSE            RADIOLOGY & ADDITIONAL TESTS: Reviewed.
HOSPITALIST PROGRESS NOTE    SUBJECTIVE / INTERVAL HPI: Patient seen and examined at bedside. No complaints. Non toxic appearing. Hx limited due to dementia.       ROS: All 10 systems reviewed and found to be negative with the exception of what has been described above.     VITAL SIGNS:    Vital Signs Last 24 Hrs  T(C): 36.8 (26 Dec 2024 08:00), Max: 36.9 (25 Dec 2024 15:53)  T(F): 98.3 (26 Dec 2024 08:00), Max: 98.5 (25 Dec 2024 15:53)  HR: 80 (26 Dec 2024 10:00) (68 - 80)  BP: 129/74 (26 Dec 2024 10:00) (115/60 - 147/64)  BP(mean): --  RR: 20 (26 Dec 2024 08:00) (18 - 20)  SpO2: 95% (26 Dec 2024 08:00) (93% - 97%)    Parameters below as of 26 Dec 2024 08:00  Patient On (Oxygen Delivery Method): room air        PHYSICAL EXAM:  General: No acute distress  HEENT: NC/AT; PERRL, anicteric sclera; MMM  Neck: Supple  Cardiovascular: +S1/S2, RRR, no murmurs, rubs, gallops  Respiratory: CTA B/L; no W/R/R  Gastrointestinal: soft, NT/ND; +BSx4  Extremities: WWP; no edema  Vascular: 2+ radial, DP/PT pulses B/L  Neurological: AAOx1    MEDICATIONS:  MEDICATIONS  (STANDING):  aspirin enteric coated 81 milliGRAM(s) Oral daily  atorvastatin 20 milliGRAM(s) Oral at bedtime  cefuroxime   Tablet 500 milliGRAM(s) Oral every 12 hours  losartan 25 milliGRAM(s) Oral daily  metoprolol succinate ER 25 milliGRAM(s) Oral daily  multivitamin 1 Tablet(s) Oral daily  sodium chloride 0.9%. 1000 milliLiter(s) (90 mL/Hr) IV Continuous <Continuous>  thiamine 100 milliGRAM(s) Oral daily    MEDICATIONS  (PRN):  acetaminophen     Tablet .. 650 milliGRAM(s) Oral every 6 hours PRN Temp greater or equal to 38C (100.4F), Mild Pain (1 - 3)  aluminum hydroxide/magnesium hydroxide/simethicone Suspension 30 milliLiter(s) Oral every 4 hours PRN Dyspepsia  melatonin 3 milliGRAM(s) Oral at bedtime PRN Insomnia  ondansetron Injectable 4 milliGRAM(s) IV Push every 8 hours PRN Nausea and/or Vomiting      ALLERGIES:  Allergies    No Known Allergies    Intolerances        LABS:              CAPILLARY BLOOD GLUCOSE            RADIOLOGY & ADDITIONAL TESTS: Reviewed.
HOSPITALIST PROGRESS NOTE    SUBJECTIVE / INTERVAL HPI: Patient seen and examined at bedside. No complaints. Non toxic appearing. Hx limited due to dementia.       ROS: All 10 systems reviewed and found to be negative with the exception of what has been described above.     VITAL SIGNS:  Vital Signs Last 24 Hrs  T(C): 37.2 (24 Dec 2024 09:15), Max: 37.2 (24 Dec 2024 09:15)  T(F): 98.9 (24 Dec 2024 09:15), Max: 98.9 (24 Dec 2024 09:15)  HR: 79 (24 Dec 2024 09:15) (65 - 79)  BP: 118/63 (24 Dec 2024 09:15) (112/56 - 127/60)  BP(mean): --  RR: 16 (24 Dec 2024 09:15) (16 - 16)  SpO2: 94% (24 Dec 2024 09:15) (94% - 99%)    Parameters below as of 24 Dec 2024 09:15  Patient On (Oxygen Delivery Method): room air        PHYSICAL EXAM:  General: No acute distress  HEENT: NC/AT; PERRL, anicteric sclera; MMM  Neck: Supple  Cardiovascular: +S1/S2, RRR, no murmurs, rubs, gallops  Respiratory: CTA B/L; no W/R/R  Gastrointestinal: soft, NT/ND; +BSx4  Extremities: WWP; no edema  Vascular: 2+ radial, DP/PT pulses B/L  Neurological: AAOx1    MEDICATIONS:  MEDICATIONS  (STANDING):  aspirin enteric coated 81 milliGRAM(s) Oral daily  atorvastatin 20 milliGRAM(s) Oral at bedtime  cefuroxime   Tablet 500 milliGRAM(s) Oral every 12 hours  losartan 25 milliGRAM(s) Oral daily  metoprolol succinate ER 25 milliGRAM(s) Oral daily  multivitamin 1 Tablet(s) Oral daily  sodium chloride 0.9%. 1000 milliLiter(s) (90 mL/Hr) IV Continuous <Continuous>  thiamine 100 milliGRAM(s) Oral daily    MEDICATIONS  (PRN):  acetaminophen     Tablet .. 650 milliGRAM(s) Oral every 6 hours PRN Temp greater or equal to 38C (100.4F), Mild Pain (1 - 3)  aluminum hydroxide/magnesium hydroxide/simethicone Suspension 30 milliLiter(s) Oral every 4 hours PRN Dyspepsia  melatonin 3 milliGRAM(s) Oral at bedtime PRN Insomnia  ondansetron Injectable 4 milliGRAM(s) IV Push every 8 hours PRN Nausea and/or Vomiting      ALLERGIES:  Allergies    No Known Allergies    Intolerances        LABS:              CAPILLARY BLOOD GLUCOSE            RADIOLOGY & ADDITIONAL TESTS: Reviewed.
HOSPITALIST PROGRESS NOTE    SUBJECTIVE / INTERVAL HPI: Patient seen and examined at bedside. No complaints. Non toxic appearing. Hx limited due to dementia.     ROS: All 10 systems reviewed and found to be negative with the exception of what has been described above.     VITAL SIGNS:  Vital Signs Last 24 Hrs  T(C): 37 (23 Dec 2024 08:38), Max: 37.3 (22 Dec 2024 17:05)  T(F): 98.6 (23 Dec 2024 08:38), Max: 99.1 (22 Dec 2024 17:05)  HR: 72 (23 Dec 2024 08:38) (65 - 72)  BP: 114/62 (23 Dec 2024 08:38) (114/62 - 180/68)  BP(mean): 78 (22 Dec 2024 23:33) (78 - 78)  RR: 16 (23 Dec 2024 08:38) (16 - 17)  SpO2: 93% (23 Dec 2024 08:38) (93% - 98%)    Parameters below as of 23 Dec 2024 08:38  Patient On (Oxygen Delivery Method): room air    PHYSICAL EXAM:  General: No acute distress  HEENT: NC/AT; PERRL, anicteric sclera; MMM  Neck: Supple  Cardiovascular: +S1/S2, RRR, no murmurs, rubs, gallops  Respiratory: CTA B/L; no W/R/R  Gastrointestinal: soft, NT/ND; +BSx4  Extremities: WWP; no edema  Vascular: 2+ radial, DP/PT pulses B/L  Neurological: AAOx1    MEDICATIONS:  MEDICATIONS  (STANDING):  aspirin enteric coated 81 milliGRAM(s) Oral daily  atorvastatin 20 milliGRAM(s) Oral at bedtime  cefuroxime   Tablet 500 milliGRAM(s) Oral every 12 hours  losartan 25 milliGRAM(s) Oral daily  metoprolol succinate ER 25 milliGRAM(s) Oral daily  multivitamin 1 Tablet(s) Oral daily  sodium chloride 0.9%. 1000 milliLiter(s) (90 mL/Hr) IV Continuous <Continuous>  thiamine 100 milliGRAM(s) Oral daily    MEDICATIONS  (PRN):  acetaminophen     Tablet .. 650 milliGRAM(s) Oral every 6 hours PRN Temp greater or equal to 38C (100.4F), Mild Pain (1 - 3)  aluminum hydroxide/magnesium hydroxide/simethicone Suspension 30 milliLiter(s) Oral every 4 hours PRN Dyspepsia  melatonin 3 milliGRAM(s) Oral at bedtime PRN Insomnia  ondansetron Injectable 4 milliGRAM(s) IV Push every 8 hours PRN Nausea and/or Vomiting      ALLERGIES:  Allergies    No Known Allergies    Intolerances        LABS:              CAPILLARY BLOOD GLUCOSE            RADIOLOGY & ADDITIONAL TESTS: Reviewed.
Interval History:  12/21/24: No new events. Patient does not report any complaints.    MEDICATIONS  (STANDING):  aspirin enteric coated 81 milliGRAM(s) Oral daily  atorvastatin 20 milliGRAM(s) Oral at bedtime  cefTRIAXone Injectable. 1000 milliGRAM(s) IV Push <User Schedule>  losartan 25 milliGRAM(s) Oral daily  metoprolol succinate ER 25 milliGRAM(s) Oral daily  multivitamin 1 Tablet(s) Oral daily  sodium chloride 0.9%. 1000 milliLiter(s) (90 mL/Hr) IV Continuous <Continuous>  thiamine 100 milliGRAM(s) Oral daily    MEDICATIONS  (PRN):  acetaminophen     Tablet .. 650 milliGRAM(s) Oral every 6 hours PRN Temp greater or equal to 38C (100.4F), Mild Pain (1 - 3)  aluminum hydroxide/magnesium hydroxide/simethicone Suspension 30 milliLiter(s) Oral every 4 hours PRN Dyspepsia  melatonin 3 milliGRAM(s) Oral at bedtime PRN Insomnia  ondansetron Injectable 4 milliGRAM(s) IV Push every 8 hours PRN Nausea and/or Vomiting      Allergies    No Known Allergies    Intolerances        PHYSICAL EXAM:  Vital Signs Last 24 Hrs  T(F): 98.4 (12-21-24 @ 08:29)  HR: 70 (12-21-24 @ 08:29)  BP: 152/62 (12-21-24 @ 08:29)  RR: 18 (12-21-24 @ 08:29)    GENERAL: NAD  HEAD:  Atraumatic, Normocephalic  Neck: no nuchal rigidity  Neuro:  Awake, alert, no aphasia  Oriented to person. Says she is in [place]. Does not know the month or year but when I told her it was December she was able to repeat this later.  CN: PERRL, EOMI, no nystagmus, no facial weakness, tongue protrudes in the midline  motor: normal tone, no pronator drift, full strength in upper extremities. No focal weakness in lower extremities  sensory: intact to light touch  coordination: finger to nose intact bilaterally  DTRs: symmetric, plantar responses flexor bilaterally    LABS:                        12.7   5.57  )-----------( 243      ( 21 Dec 2024 07:16 )             39.1     12-21    138  |  107  |  10  ----------------------------<  100[H]  3.8   |  26  |  0.64    Ca    8.8      21 Dec 2024 07:16  Phos  2.9     12-21  Mg     1.9     12-21    TPro  6.4  /  Alb  3.0[L]  /  TBili  0.5  /  DBili  x   /  AST  19  /  ALT  38  /  AlkPhos  90  12-21    PT/INR - ( 20 Dec 2024 12:51 )   PT: 10.7 sec;   INR: 0.91 ratio         PTT - ( 20 Dec 2024 12:51 )  PTT:29.2 sec  Urinalysis Basic - ( 21 Dec 2024 07:16 )    Color: x / Appearance: x / SG: x / pH: x  Gluc: 100 mg/dL / Ketone: x  / Bili: x / Urobili: x   Blood: x / Protein: x / Nitrite: x   Leuk Esterase: x / RBC: x / WBC x   Sq Epi: x / Non Sq Epi: x / Bacteria: x        RADIOLOGY & ADDITIONAL STUDIES:  CT head, CTA head and neck, CT perfusion 12/20/24:    Noacute infarction or intracranial hemorrhage  No core infarct. Increased Tmax in the right cerebellum and anterior left temporal lobe.  No large vessel occlusion.    EEG 12/20/24:   Generalized background slowing rhythmic theta.  This is an abnormal EEG consistent with mild bilateral diffuse cerebral dysfunction.         
Interval History: No new events. The patient does not report any complaints.    MEDICATIONS  (STANDING):  aspirin enteric coated 81 milliGRAM(s) Oral daily  atorvastatin 20 milliGRAM(s) Oral at bedtime  cefTRIAXone Injectable. 1000 milliGRAM(s) IV Push <User Schedule>  losartan 25 milliGRAM(s) Oral daily  metoprolol succinate ER 25 milliGRAM(s) Oral daily  multivitamin 1 Tablet(s) Oral daily  sodium chloride 0.9%. 1000 milliLiter(s) (90 mL/Hr) IV Continuous <Continuous>  thiamine 100 milliGRAM(s) Oral daily    MEDICATIONS  (PRN):  acetaminophen     Tablet .. 650 milliGRAM(s) Oral every 6 hours PRN Temp greater or equal to 38C (100.4F), Mild Pain (1 - 3)  aluminum hydroxide/magnesium hydroxide/simethicone Suspension 30 milliLiter(s) Oral every 4 hours PRN Dyspepsia  melatonin 3 milliGRAM(s) Oral at bedtime PRN Insomnia  ondansetron Injectable 4 milliGRAM(s) IV Push every 8 hours PRN Nausea and/or Vomiting      Allergies    No Known Allergies    Intolerances        PHYSICAL EXAM:  Vital Signs Last 24 Hrs  T(F): 98.7 (12-22-24 @ 09:03)  HR: 73 (12-22-24 @ 09:03)  BP: 153/59 (12-22-24 @ 09:03)  RR: 17 (12-22-24 @ 09:03)    GENERAL: NAD, well-groomed, well-developed  HEAD:  Atraumatic, Normocephalic  Neuro:  Awake, alert. Confused. Able to state her name. Says we are in [place]  Paucity of speech  Follows simple commands  CN: PERRL, EOMI, no nystagmus, no facial weakness, tongue protrudes in the midline  motor: normal tone, no pronator drift, full strength in all four extremities  sensory: intact to light touch  coordination: no involuntary movements  gait: not tested    LABS:                        12.7   5.57  )-----------( 243      ( 21 Dec 2024 07:16 )             39.1     12-21    138  |  107  |  10  ----------------------------<  100[H]  3.8   |  26  |  0.64    Ca    8.8      21 Dec 2024 07:16  Phos  2.9     12-21  Mg     1.9     12-21    TPro  6.4  /  Alb  3.0[L]  /  TBili  0.5  /  DBili  x   /  AST  19  /  ALT  38  /  AlkPhos  90  12-21    PT/INR - ( 20 Dec 2024 12:51 )   PT: 10.7 sec;   INR: 0.91 ratio         PTT - ( 20 Dec 2024 12:51 )  PTT:29.2 sec  Urinalysis Basic - ( 21 Dec 2024 07:16 )    Color: x / Appearance: x / SG: x / pH: x  Gluc: 100 mg/dL / Ketone: x  / Bili: x / Urobili: x   Blood: x / Protein: x / Nitrite: x   Leuk Esterase: x / RBC: x / WBC x   Sq Epi: x / Non Sq Epi: x / Bacteria: x        RADIOLOGY & ADDITIONAL STUDIES:  CT head, CTA head and neck, CT perfusion 12/20/24:    Noacute infarction or intracranial hemorrhage  No core infarct. Increased Tmax in the right cerebellum and anterior left temporal lobe.  No large vessel occlusion.    EEG 12/20/24:   Generalized background slowing rhythmic theta.  This is an abnormal EEG consistent with mild bilateral diffuse cerebral dysfunction.         
HOSPITALIST PROGRESS NOTE    SUBJECTIVE / INTERVAL HPI: Patient seen and examined at bedside. No complaints. Son at bedside, pt weaker than baseline. Discussing [facility] prior to assisted living. Febrile last night, now improving.     ROS: All 10 systems reviewed and found to be negative with the exception of what has been described above.     VITAL SIGNS:  Vital Signs Last 24 Hrs  T(C): 37.1 (22 Dec 2024 09:03), Max: 38.5 (21 Dec 2024 18:10)  T(F): 98.7 (22 Dec 2024 09:03), Max: 101.3 (21 Dec 2024 18:10)  HR: 73 (22 Dec 2024 09:03) (73 - 84)  BP: 153/59 (22 Dec 2024 09:03) (151/67 - 181/68)  BP(mean): --  RR: 17 (22 Dec 2024 09:03) (17 - 18)  SpO2: 98% (22 Dec 2024 09:03) (97% - 98%)    Parameters below as of 21 Dec 2024 23:39  Patient On (Oxygen Delivery Method): room air      PHYSICAL EXAM:  General: No acute distress  HEENT: NC/AT; PERRL, anicteric sclera; MMM  Neck: Supple  Cardiovascular: +S1/S2, RRR, no murmurs, rubs, gallops  Respiratory: CTA B/L; no W/R/R  Gastrointestinal: soft, NT/ND; +BSx4  Extremities: WWP; no edema  Vascular: 2+ radial, DP/PT pulses B/L  Neurological: AAOx1    MEDICATIONS:  MEDICATIONS  (STANDING):  aspirin enteric coated 81 milliGRAM(s) Oral daily  atorvastatin 20 milliGRAM(s) Oral at bedtime  cefTRIAXone Injectable. 1000 milliGRAM(s) IV Push <User Schedule>  losartan 25 milliGRAM(s) Oral daily  metoprolol succinate ER 25 milliGRAM(s) Oral daily  multivitamin 1 Tablet(s) Oral daily  sodium chloride 0.9%. 1000 milliLiter(s) (90 mL/Hr) IV Continuous <Continuous>  thiamine 100 milliGRAM(s) Oral daily    MEDICATIONS  (PRN):  acetaminophen     Tablet .. 650 milliGRAM(s) Oral every 6 hours PRN Temp greater or equal to 38C (100.4F), Mild Pain (1 - 3)  aluminum hydroxide/magnesium hydroxide/simethicone Suspension 30 milliLiter(s) Oral every 4 hours PRN Dyspepsia  melatonin 3 milliGRAM(s) Oral at bedtime PRN Insomnia  ondansetron Injectable 4 milliGRAM(s) IV Push every 8 hours PRN Nausea and/or Vomiting      ALLERGIES:  Allergies    No Known Allergies    Intolerances        LABS:                        12.7   5.57  )-----------( 243      ( 21 Dec 2024 07:16 )             39.1     12-21    138  |  107  |  10  ----------------------------<  100[H]  3.8   |  26  |  0.64    Ca    8.8      21 Dec 2024 07:16  Phos  2.9     12-21  Mg     1.9     12-21    TPro  6.4  /  Alb  3.0[L]  /  TBili  0.5  /  DBili  x   /  AST  19  /  ALT  38  /  AlkPhos  90  12-21      Urinalysis Basic - ( 21 Dec 2024 07:16 )    Color: x / Appearance: x / SG: x / pH: x  Gluc: 100 mg/dL / Ketone: x  / Bili: x / Urobili: x   Blood: x / Protein: x / Nitrite: x   Leuk Esterase: x / RBC: x / WBC x   Sq Epi: x / Non Sq Epi: x / Bacteria: x      CAPILLARY BLOOD GLUCOSE            RADIOLOGY & ADDITIONAL TESTS: Reviewed.

## 2024-12-26 NOTE — PROGRESS NOTE ADULT - NUTRITIONAL ASSESSMENT
This patient has been assessed with a concern for Malnutrition and has been determined to have a diagnosis/diagnoses of Severe protein-calorie malnutrition.    This patient is being managed with:   Diet Regular-  Entered: Dec 21 2024 12:00PM  

## 2024-12-27 ENCOUNTER — TRANSCRIPTION ENCOUNTER (OUTPATIENT)
Age: 84
End: 2024-12-27

## 2024-12-27 VITALS
HEART RATE: 67 BPM | DIASTOLIC BLOOD PRESSURE: 72 MMHG | SYSTOLIC BLOOD PRESSURE: 138 MMHG | RESPIRATION RATE: 18 BRPM | OXYGEN SATURATION: 94 % | TEMPERATURE: 98 F

## 2024-12-27 PROCEDURE — 99239 HOSP IP/OBS DSCHRG MGMT >30: CPT

## 2024-12-27 RX ORDER — ACETAMINOPHEN 80 MG/.8ML
2 SOLUTION/ DROPS ORAL
Qty: 0 | Refills: 0 | DISCHARGE
Start: 2024-12-27

## 2024-12-27 RX ADMIN — THIAMINE HYDROCHLORIDE 100 MILLIGRAM(S): 100 INJECTION, SOLUTION INTRAMUSCULAR; INTRAVENOUS at 09:30

## 2024-12-27 RX ADMIN — Medication 1 TABLET(S): at 09:30

## 2024-12-27 RX ADMIN — Medication 81 MILLIGRAM(S): at 09:30

## 2024-12-27 RX ADMIN — Medication 25 MILLIGRAM(S): at 09:30

## 2024-12-27 RX ADMIN — LOSARTAN POTASSIUM 25 MILLIGRAM(S): 100 TABLET, FILM COATED ORAL at 09:30

## 2024-12-27 NOTE — DISCHARGE NOTE NURSING/CASE MANAGEMENT/SOCIAL WORK - NSDCVIVACCINE_GEN_ALL_CORE_FT
Tdap; 15-Mar-2023 18:56; Ayana Gonzales (YESICA); [employer]; [id number] (Exp. Date: 08-Dec-2024); IntraMuscular; Deltoid Left.; 0.5 milliLiter(s); VIS (VIS Published: 09-May-2013, VIS Presented: 15-Mar-2023);

## 2024-12-27 NOTE — DISCHARGE NOTE PROVIDER - CARE PROVIDER_API CALL
Ivon Larkin [address], [address]  [place] [address]  Phone: [phone]  Fax: [phone]  Follow Up Time: 1-3 days

## 2024-12-27 NOTE — DISCHARGE NOTE PROVIDER - NSDCMRMEDTOKEN_GEN_ALL_CORE_FT
acetaminophen 325 mg oral tablet: 2 tab(s) orally every 6 hours As needed Temp greater or equal to 38C (100.4F), Mild Pain (1 - 3)  aspirin 81 mg oral delayed release tablet: 1 tab(s) orally once a day  atorvastatin 20 mg oral tablet: 1 tab(s) orally once a day (at bedtime)  Daily Rich oral tablet: 1 tab(s) orally once a day  losartan 25 mg oral tablet: 1 tab(s) orally once a day  metoprolol succinate 25 mg oral tablet, extended release: 1 tab(s) orally once a day  thiamine 100 mg oral tablet: 1 tab(s) orally once a day

## 2024-12-27 NOTE — DISCHARGE NOTE PROVIDER - DETAILS OF MALNUTRITION DIAGNOSIS/DIAGNOSES
This patient has been assessed with a concern for Malnutrition and was treated during this hospitalization for the following Nutrition diagnosis/diagnoses:     -  12/22/2024: Severe protein-calorie malnutrition

## 2024-12-27 NOTE — DISCHARGE NOTE PROVIDER - CARE PROVIDERS DIRECT ADDRESSES
Pt notified, abx sent in. [email or web address]

## 2024-12-27 NOTE — DISCHARGE NOTE NURSING/CASE MANAGEMENT/SOCIAL WORK - PATIENT PORTAL LINK FT
You can access the FollowMyHealth Patient Portal offered by [facility] by registering at the following website: [email or web address]. By joining [facility]’s FollowMyHealth portal, you will also be able to view your health information using other applications (apps) compatible with our system.

## 2024-12-27 NOTE — DISCHARGE NOTE NURSING/CASE MANAGEMENT/SOCIAL WORK - FINANCIAL ASSISTANCE
[facility] provides services at a reduced cost to those who are determined to be eligible through [facility]’s financial assistance program. Information regarding [facility]’s financial assistance program can be found by going to [email or web address] or by calling [phone].

## 2024-12-27 NOTE — DISCHARGE NOTE PROVIDER - HOSPITAL COURSE
PHYSICAL EXAM:    Daily     Daily     Vital Signs Last 24 Hrs  T(C): 36.9 (27 Dec 2024 09:20), Max: 36.9 (27 Dec 2024 09:20)  T(F): 98.4 (27 Dec 2024 09:20), Max: 98.4 (27 Dec 2024 09:20)  HR: 65 (27 Dec 2024 09:20) (65 - 67)  BP: 141/64 (27 Dec 2024 09:20) (139/79 - 141/64)  BP(mean): --  RR: 17 (27 Dec 2024 09:20) (17 - 18)  SpO2: 97% (27 Dec 2024 09:20) (97% - 98%)    Constitutional: Weak  appearing  HEENT: Atraumatic, REBECCA, Normal, No congestion  Respiratory: Breath Sounds normal, no rhonchi/wheeze  Cardiovascular: N S1S2;   Gastrointestinal: Abdomen soft, non tender, Bowel Sounds present  Extremities: No edema, peripheral pulses present  Neurological: AAO x 1, no gross focal motor deficits  Skin: Non cellulitic, no rash, ulcers  Lymph Nodes: No lymphadenopathy noted  Back: No CVA tenderness   Musculoskeletal: non tender  Breasts: Deferred  Genitourinary: deferred  Rectal: Deferred    84F admitted for syncope and AMS.    #Syncope (long history)  #UTI  -Pt has had syncope and seizure work up multiple times in the past, and Son defers further workup for now unless necessary  -Afebile   -s/p Ceftriaxone  -Urine cx reviewed  -Transition to Ceftin. completed course 12/24    # HTN and HPL  -On Metoprolol and Atorvastatin.    #Elevated troponins  -Trops flat  -Has had troponins in the past  -No chest pain, ECG NSR.     # Dementia.  -Continue her multivitamins    #Severe protein-calorie malnutrition    d/c to [facility]    time spent 45 min

## 2025-01-02 DIAGNOSIS — G93.41 METABOLIC ENCEPHALOPATHY: ICD-10-CM

## 2025-01-02 DIAGNOSIS — N39.0 URINARY TRACT INFECTION, SITE NOT SPECIFIED: ICD-10-CM

## 2025-01-02 DIAGNOSIS — I10 ESSENTIAL (PRIMARY) HYPERTENSION: ICD-10-CM

## 2025-01-02 DIAGNOSIS — Z66 DO NOT RESUSCITATE: ICD-10-CM

## 2025-01-02 DIAGNOSIS — E43 UNSPECIFIED SEVERE PROTEIN-CALORIE MALNUTRITION: ICD-10-CM

## 2025-01-02 DIAGNOSIS — Z79.82 LONG TERM (CURRENT) USE OF ASPIRIN: ICD-10-CM

## 2025-01-02 DIAGNOSIS — E78.5 HYPERLIPIDEMIA, UNSPECIFIED: ICD-10-CM

## 2025-01-29 NOTE — DIETITIAN INITIAL EVALUATION ADULT - FLUID ACCUMULATION
Occupational Therapy    Patient not seen in therapy.     Unavailable due to medical tests/procedures.      Re-attempt plan: per established plan of care, tomorrow or later today    Patient about to transport to x-ray, will re-attempt when patient is back on the unit and as schedule allows.      OBJECTIVE                          Documented in the chart in the following areas: Assessment/Plan.      Therapy procedure time and total treatment time can be found documented on the Time Entry flowsheet   Edema: not documented